# Patient Record
Sex: MALE | Race: OTHER | HISPANIC OR LATINO | URBAN - METROPOLITAN AREA
[De-identification: names, ages, dates, MRNs, and addresses within clinical notes are randomized per-mention and may not be internally consistent; named-entity substitution may affect disease eponyms.]

---

## 2024-04-09 ENCOUNTER — INPATIENT (INPATIENT)
Facility: HOSPITAL | Age: 69
LOS: 3 days | Discharge: HOME | DRG: 661 | End: 2024-04-13
Attending: INTERNAL MEDICINE | Admitting: INTERNAL MEDICINE
Payer: MEDICARE

## 2024-04-09 VITALS
HEART RATE: 95 BPM | OXYGEN SATURATION: 99 % | DIASTOLIC BLOOD PRESSURE: 77 MMHG | TEMPERATURE: 98 F | HEIGHT: 63.78 IN | WEIGHT: 156.09 LBS | SYSTOLIC BLOOD PRESSURE: 133 MMHG | RESPIRATION RATE: 14 BRPM

## 2024-04-09 LAB
ALBUMIN SERPL ELPH-MCNC: 3.4 G/DL — LOW (ref 3.5–5)
ALP SERPL-CCNC: 114 U/L — SIGNIFICANT CHANGE UP (ref 40–120)
ALT FLD-CCNC: 45 U/L DA — SIGNIFICANT CHANGE UP (ref 10–60)
ANION GAP SERPL CALC-SCNC: 4 MMOL/L — LOW (ref 5–17)
APTT BLD: 32.3 SEC — SIGNIFICANT CHANGE UP (ref 24.5–35.6)
AST SERPL-CCNC: 26 U/L — SIGNIFICANT CHANGE UP (ref 10–40)
BASOPHILS # BLD AUTO: 0.04 K/UL — SIGNIFICANT CHANGE UP (ref 0–0.2)
BASOPHILS NFR BLD AUTO: 0.4 % — SIGNIFICANT CHANGE UP (ref 0–2)
BILIRUB SERPL-MCNC: 1.3 MG/DL — HIGH (ref 0.2–1.2)
BUN SERPL-MCNC: 23 MG/DL — HIGH (ref 7–18)
CALCIUM SERPL-MCNC: 9.2 MG/DL — SIGNIFICANT CHANGE UP (ref 8.4–10.5)
CHLORIDE SERPL-SCNC: 108 MMOL/L — SIGNIFICANT CHANGE UP (ref 96–108)
CO2 SERPL-SCNC: 24 MMOL/L — SIGNIFICANT CHANGE UP (ref 22–31)
CREAT SERPL-MCNC: 2.22 MG/DL — HIGH (ref 0.5–1.3)
EGFR: 31 ML/MIN/1.73M2 — LOW
EOSINOPHIL # BLD AUTO: 0.16 K/UL — SIGNIFICANT CHANGE UP (ref 0–0.5)
EOSINOPHIL NFR BLD AUTO: 1.5 % — SIGNIFICANT CHANGE UP (ref 0–6)
GLUCOSE SERPL-MCNC: 160 MG/DL — HIGH (ref 70–99)
HCT VFR BLD CALC: 44.2 % — SIGNIFICANT CHANGE UP (ref 39–50)
HGB BLD-MCNC: 14.6 G/DL — SIGNIFICANT CHANGE UP (ref 13–17)
IMM GRANULOCYTES NFR BLD AUTO: 0.5 % — SIGNIFICANT CHANGE UP (ref 0–0.9)
INR BLD: 1.05 RATIO — SIGNIFICANT CHANGE UP (ref 0.85–1.18)
LYMPHOCYTES # BLD AUTO: 2.56 K/UL — SIGNIFICANT CHANGE UP (ref 1–3.3)
LYMPHOCYTES # BLD AUTO: 23.5 % — SIGNIFICANT CHANGE UP (ref 13–44)
MCHC RBC-ENTMCNC: 28.9 PG — SIGNIFICANT CHANGE UP (ref 27–34)
MCHC RBC-ENTMCNC: 33 GM/DL — SIGNIFICANT CHANGE UP (ref 32–36)
MCV RBC AUTO: 87.4 FL — SIGNIFICANT CHANGE UP (ref 80–100)
MONOCYTES # BLD AUTO: 1.11 K/UL — HIGH (ref 0–0.9)
MONOCYTES NFR BLD AUTO: 10.2 % — SIGNIFICANT CHANGE UP (ref 2–14)
NEUTROPHILS # BLD AUTO: 6.98 K/UL — SIGNIFICANT CHANGE UP (ref 1.8–7.4)
NEUTROPHILS NFR BLD AUTO: 63.9 % — SIGNIFICANT CHANGE UP (ref 43–77)
NRBC # BLD: 0 /100 WBCS — SIGNIFICANT CHANGE UP (ref 0–0)
PLATELET # BLD AUTO: 217 K/UL — SIGNIFICANT CHANGE UP (ref 150–400)
POTASSIUM SERPL-MCNC: 4.3 MMOL/L — SIGNIFICANT CHANGE UP (ref 3.5–5.3)
POTASSIUM SERPL-SCNC: 4.3 MMOL/L — SIGNIFICANT CHANGE UP (ref 3.5–5.3)
PROT SERPL-MCNC: 7.6 G/DL — SIGNIFICANT CHANGE UP (ref 6–8.3)
PROTHROM AB SERPL-ACNC: 12 SEC — SIGNIFICANT CHANGE UP (ref 9.5–13)
RBC # BLD: 5.06 M/UL — SIGNIFICANT CHANGE UP (ref 4.2–5.8)
RBC # FLD: 14.1 % — SIGNIFICANT CHANGE UP (ref 10.3–14.5)
SODIUM SERPL-SCNC: 136 MMOL/L — SIGNIFICANT CHANGE UP (ref 135–145)
WBC # BLD: 10.91 K/UL — HIGH (ref 3.8–10.5)
WBC # FLD AUTO: 10.91 K/UL — HIGH (ref 3.8–10.5)

## 2024-04-09 PROCEDURE — 99285 EMERGENCY DEPT VISIT HI MDM: CPT

## 2024-04-09 RX ORDER — KETOROLAC TROMETHAMINE 30 MG/ML
15 SYRINGE (ML) INJECTION ONCE
Refills: 0 | Status: DISCONTINUED | OUTPATIENT
Start: 2024-04-09 | End: 2024-04-09

## 2024-04-09 RX ORDER — ONDANSETRON 8 MG/1
4 TABLET, FILM COATED ORAL ONCE
Refills: 0 | Status: COMPLETED | OUTPATIENT
Start: 2024-04-09 | End: 2024-04-09

## 2024-04-09 RX ORDER — SODIUM CHLORIDE 9 MG/ML
1000 INJECTION INTRAMUSCULAR; INTRAVENOUS; SUBCUTANEOUS ONCE
Refills: 0 | Status: COMPLETED | OUTPATIENT
Start: 2024-04-09 | End: 2024-04-09

## 2024-04-09 RX ADMIN — SODIUM CHLORIDE 1000 MILLILITER(S): 9 INJECTION INTRAMUSCULAR; INTRAVENOUS; SUBCUTANEOUS at 22:56

## 2024-04-09 RX ADMIN — Medication 15 MILLIGRAM(S): at 22:55

## 2024-04-09 RX ADMIN — ONDANSETRON 4 MILLIGRAM(S): 8 TABLET, FILM COATED ORAL at 22:55

## 2024-04-09 NOTE — ED PROVIDER NOTE - PROGRESS NOTE DETAILS
Patient states no past issues of CKD patient creatinine elevated 2.2 otherwise clinically well will obtain CT UA likely admit given SHAHBAZ Man SANDOVAL: pt signed out to me. pt endorsed to urology given stone, will admit to medicine

## 2024-04-09 NOTE — ED PROVIDER NOTE - CLINICAL SUMMARY MEDICAL DECISION MAKING FREE TEXT BOX
Patient presenting with left CVA pain clinically appears to be kidney stone will obtain labs CT assess for stone assess kidney function

## 2024-04-09 NOTE — ED ADULT NURSE NOTE - NSFALLUNIVINTERV_ED_ALL_ED
Bed/Stretcher in lowest position, wheels locked, appropriate side rails in place/Call bell, personal items and telephone in reach/Instruct patient to call for assistance before getting out of bed/chair/stretcher/Non-slip footwear applied when patient is off stretcher/[place] to call system/Physically safe environment - no spills, clutter or unnecessary equipment/Purposeful proactive rounding/Room/bathroom lighting operational, light cord in reach

## 2024-04-10 DIAGNOSIS — N17.9 ACUTE KIDNEY FAILURE, UNSPECIFIED: ICD-10-CM

## 2024-04-10 DIAGNOSIS — N20.0 CALCULUS OF KIDNEY: ICD-10-CM

## 2024-04-10 DIAGNOSIS — E11.9 TYPE 2 DIABETES MELLITUS WITHOUT COMPLICATIONS: ICD-10-CM

## 2024-04-10 DIAGNOSIS — R10.9 UNSPECIFIED ABDOMINAL PAIN: ICD-10-CM

## 2024-04-10 DIAGNOSIS — Z29.9 ENCOUNTER FOR PROPHYLACTIC MEASURES, UNSPECIFIED: ICD-10-CM

## 2024-04-10 DIAGNOSIS — E78.5 HYPERLIPIDEMIA, UNSPECIFIED: ICD-10-CM

## 2024-04-10 LAB
ALBUMIN SERPL ELPH-MCNC: 3.1 G/DL — LOW (ref 3.5–5)
ALP SERPL-CCNC: 101 U/L — SIGNIFICANT CHANGE UP (ref 40–120)
ALT FLD-CCNC: 34 U/L DA — SIGNIFICANT CHANGE UP (ref 10–60)
ANION GAP SERPL CALC-SCNC: 5 MMOL/L — SIGNIFICANT CHANGE UP (ref 5–17)
ANION GAP SERPL CALC-SCNC: 7 MMOL/L — SIGNIFICANT CHANGE UP (ref 5–17)
APPEARANCE UR: CLEAR — SIGNIFICANT CHANGE UP
AST SERPL-CCNC: 20 U/L — SIGNIFICANT CHANGE UP (ref 10–40)
BASOPHILS # BLD AUTO: 0.04 K/UL — SIGNIFICANT CHANGE UP (ref 0–0.2)
BASOPHILS NFR BLD AUTO: 0.3 % — SIGNIFICANT CHANGE UP (ref 0–2)
BILIRUB SERPL-MCNC: 1.6 MG/DL — HIGH (ref 0.2–1.2)
BILIRUB UR-MCNC: NEGATIVE — SIGNIFICANT CHANGE UP
BLD GP AB SCN SERPL QL: SIGNIFICANT CHANGE UP
BUN SERPL-MCNC: 22 MG/DL — HIGH (ref 7–18)
BUN SERPL-MCNC: 22 MG/DL — HIGH (ref 7–18)
CALCIUM SERPL-MCNC: 8.6 MG/DL — SIGNIFICANT CHANGE UP (ref 8.4–10.5)
CALCIUM SERPL-MCNC: 9 MG/DL — SIGNIFICANT CHANGE UP (ref 8.4–10.5)
CHLORIDE SERPL-SCNC: 107 MMOL/L — SIGNIFICANT CHANGE UP (ref 96–108)
CHLORIDE SERPL-SCNC: 108 MMOL/L — SIGNIFICANT CHANGE UP (ref 96–108)
CO2 SERPL-SCNC: 23 MMOL/L — SIGNIFICANT CHANGE UP (ref 22–31)
CO2 SERPL-SCNC: 23 MMOL/L — SIGNIFICANT CHANGE UP (ref 22–31)
COLOR SPEC: YELLOW — SIGNIFICANT CHANGE UP
CREAT ?TM UR-MCNC: 112 MG/DL — SIGNIFICANT CHANGE UP
CREAT SERPL-MCNC: 1.84 MG/DL — HIGH (ref 0.5–1.3)
CREAT SERPL-MCNC: 2.15 MG/DL — HIGH (ref 0.5–1.3)
DIFF PNL FLD: NEGATIVE — SIGNIFICANT CHANGE UP
EGFR: 33 ML/MIN/1.73M2 — LOW
EGFR: 39 ML/MIN/1.73M2 — LOW
EOSINOPHIL # BLD AUTO: 0.28 K/UL — SIGNIFICANT CHANGE UP (ref 0–0.5)
EOSINOPHIL NFR BLD AUTO: 2.4 % — SIGNIFICANT CHANGE UP (ref 0–6)
GLUCOSE BLDC GLUCOMTR-MCNC: 105 MG/DL — HIGH (ref 70–99)
GLUCOSE BLDC GLUCOMTR-MCNC: 115 MG/DL — HIGH (ref 70–99)
GLUCOSE BLDC GLUCOMTR-MCNC: 122 MG/DL — HIGH (ref 70–99)
GLUCOSE BLDC GLUCOMTR-MCNC: 128 MG/DL — HIGH (ref 70–99)
GLUCOSE SERPL-MCNC: 146 MG/DL — HIGH (ref 70–99)
GLUCOSE SERPL-MCNC: 148 MG/DL — HIGH (ref 70–99)
GLUCOSE UR QL: 250 MG/DL
HCT VFR BLD CALC: 46.3 % — SIGNIFICANT CHANGE UP (ref 39–50)
HGB BLD-MCNC: 14.8 G/DL — SIGNIFICANT CHANGE UP (ref 13–17)
IMM GRANULOCYTES NFR BLD AUTO: 0.5 % — SIGNIFICANT CHANGE UP (ref 0–0.9)
KETONES UR-MCNC: NEGATIVE MG/DL — SIGNIFICANT CHANGE UP
LEUKOCYTE ESTERASE UR-ACNC: NEGATIVE — SIGNIFICANT CHANGE UP
LYMPHOCYTES # BLD AUTO: 3.83 K/UL — HIGH (ref 1–3.3)
LYMPHOCYTES # BLD AUTO: 32.4 % — SIGNIFICANT CHANGE UP (ref 13–44)
MAGNESIUM SERPL-MCNC: 2.1 MG/DL — SIGNIFICANT CHANGE UP (ref 1.6–2.6)
MCHC RBC-ENTMCNC: 28.2 PG — SIGNIFICANT CHANGE UP (ref 27–34)
MCHC RBC-ENTMCNC: 32 GM/DL — SIGNIFICANT CHANGE UP (ref 32–36)
MCV RBC AUTO: 88.4 FL — SIGNIFICANT CHANGE UP (ref 80–100)
MONOCYTES # BLD AUTO: 1.15 K/UL — HIGH (ref 0–0.9)
MONOCYTES NFR BLD AUTO: 9.7 % — SIGNIFICANT CHANGE UP (ref 2–14)
NEUTROPHILS # BLD AUTO: 6.45 K/UL — SIGNIFICANT CHANGE UP (ref 1.8–7.4)
NEUTROPHILS NFR BLD AUTO: 54.7 % — SIGNIFICANT CHANGE UP (ref 43–77)
NITRITE UR-MCNC: NEGATIVE — SIGNIFICANT CHANGE UP
NRBC # BLD: 0 /100 WBCS — SIGNIFICANT CHANGE UP (ref 0–0)
PH UR: 6 — SIGNIFICANT CHANGE UP (ref 5–8)
PHOSPHATE SERPL-MCNC: 2.6 MG/DL — SIGNIFICANT CHANGE UP (ref 2.5–4.5)
PLATELET # BLD AUTO: 218 K/UL — SIGNIFICANT CHANGE UP (ref 150–400)
POTASSIUM SERPL-MCNC: 4 MMOL/L — SIGNIFICANT CHANGE UP (ref 3.5–5.3)
POTASSIUM SERPL-MCNC: 4.1 MMOL/L — SIGNIFICANT CHANGE UP (ref 3.5–5.3)
POTASSIUM SERPL-SCNC: 4 MMOL/L — SIGNIFICANT CHANGE UP (ref 3.5–5.3)
POTASSIUM SERPL-SCNC: 4.1 MMOL/L — SIGNIFICANT CHANGE UP (ref 3.5–5.3)
PROT SERPL-MCNC: 7.3 G/DL — SIGNIFICANT CHANGE UP (ref 6–8.3)
PROT UR-MCNC: ABNORMAL MG/DL
RBC # BLD: 5.24 M/UL — SIGNIFICANT CHANGE UP (ref 4.2–5.8)
RBC # FLD: 14.3 % — SIGNIFICANT CHANGE UP (ref 10.3–14.5)
RBC CASTS # UR COMP ASSIST: 0 /HPF — SIGNIFICANT CHANGE UP (ref 0–4)
SODIUM SERPL-SCNC: 136 MMOL/L — SIGNIFICANT CHANGE UP (ref 135–145)
SODIUM SERPL-SCNC: 137 MMOL/L — SIGNIFICANT CHANGE UP (ref 135–145)
SODIUM UR-SCNC: 114 MMOL/L — SIGNIFICANT CHANGE UP
SP GR SPEC: 1.02 — SIGNIFICANT CHANGE UP (ref 1–1.03)
UROBILINOGEN FLD QL: 1 MG/DL — SIGNIFICANT CHANGE UP (ref 0.2–1)
UUN UR-MCNC: 625 MG/DL — SIGNIFICANT CHANGE UP
WBC # BLD: 11.81 K/UL — HIGH (ref 3.8–10.5)
WBC # FLD AUTO: 11.81 K/UL — HIGH (ref 3.8–10.5)
WBC UR QL: 0 /HPF — SIGNIFICANT CHANGE UP (ref 0–5)

## 2024-04-10 PROCEDURE — 99223 1ST HOSP IP/OBS HIGH 75: CPT

## 2024-04-10 PROCEDURE — 99222 1ST HOSP IP/OBS MODERATE 55: CPT

## 2024-04-10 PROCEDURE — 74176 CT ABD & PELVIS W/O CONTRAST: CPT | Mod: 26,MC

## 2024-04-10 RX ORDER — ONDANSETRON 8 MG/1
4 TABLET, FILM COATED ORAL EVERY 6 HOURS
Refills: 0 | Status: DISCONTINUED | OUTPATIENT
Start: 2024-04-10 | End: 2024-04-13

## 2024-04-10 RX ORDER — ACETAMINOPHEN 500 MG
650 TABLET ORAL EVERY 6 HOURS
Refills: 0 | Status: DISCONTINUED | OUTPATIENT
Start: 2024-04-10 | End: 2024-04-13

## 2024-04-10 RX ORDER — HYDROMORPHONE HYDROCHLORIDE 2 MG/ML
0.5 INJECTION INTRAMUSCULAR; INTRAVENOUS; SUBCUTANEOUS EVERY 6 HOURS
Refills: 0 | Status: DISCONTINUED | OUTPATIENT
Start: 2024-04-10 | End: 2024-04-13

## 2024-04-10 RX ORDER — PRAMIPEXOLE DIHYDROCHLORIDE 0.12 MG/1
1 TABLET ORAL
Refills: 0 | DISCHARGE

## 2024-04-10 RX ORDER — HYDROMORPHONE HYDROCHLORIDE 2 MG/ML
0.5 INJECTION INTRAMUSCULAR; INTRAVENOUS; SUBCUTANEOUS EVERY 4 HOURS
Refills: 0 | Status: DISCONTINUED | OUTPATIENT
Start: 2024-04-10 | End: 2024-04-10

## 2024-04-10 RX ORDER — INSULIN LISPRO 100/ML
VIAL (ML) SUBCUTANEOUS EVERY 6 HOURS
Refills: 0 | Status: DISCONTINUED | OUTPATIENT
Start: 2024-04-10 | End: 2024-04-10

## 2024-04-10 RX ORDER — OXYCODONE HYDROCHLORIDE 5 MG/1
10 TABLET ORAL EVERY 6 HOURS
Refills: 0 | Status: DISCONTINUED | OUTPATIENT
Start: 2024-04-10 | End: 2024-04-13

## 2024-04-10 RX ORDER — POLYETHYLENE GLYCOL 3350 17 G/17G
17 POWDER, FOR SOLUTION ORAL DAILY
Refills: 0 | Status: DISCONTINUED | OUTPATIENT
Start: 2024-04-10 | End: 2024-04-13

## 2024-04-10 RX ORDER — HEPARIN SODIUM 5000 [USP'U]/ML
5000 INJECTION INTRAVENOUS; SUBCUTANEOUS EVERY 8 HOURS
Refills: 0 | Status: DISCONTINUED | OUTPATIENT
Start: 2024-04-10 | End: 2024-04-11

## 2024-04-10 RX ORDER — SODIUM CHLORIDE 9 MG/ML
1000 INJECTION, SOLUTION INTRAVENOUS
Refills: 0 | Status: DISCONTINUED | OUTPATIENT
Start: 2024-04-10 | End: 2024-04-11

## 2024-04-10 RX ORDER — TAMSULOSIN HYDROCHLORIDE 0.4 MG/1
0.4 CAPSULE ORAL AT BEDTIME
Refills: 0 | Status: DISCONTINUED | OUTPATIENT
Start: 2024-04-10 | End: 2024-04-13

## 2024-04-10 RX ORDER — METFORMIN HYDROCHLORIDE 850 MG/1
1 TABLET ORAL
Refills: 0 | DISCHARGE

## 2024-04-10 RX ORDER — SENNA PLUS 8.6 MG/1
2 TABLET ORAL AT BEDTIME
Refills: 0 | Status: DISCONTINUED | OUTPATIENT
Start: 2024-04-10 | End: 2024-04-13

## 2024-04-10 RX ORDER — SODIUM CHLORIDE 9 MG/ML
1000 INJECTION, SOLUTION INTRAVENOUS
Refills: 0 | Status: DISCONTINUED | OUTPATIENT
Start: 2024-04-10 | End: 2024-04-10

## 2024-04-10 RX ORDER — HEPARIN SODIUM 5000 [USP'U]/ML
5000 INJECTION INTRAVENOUS; SUBCUTANEOUS EVERY 12 HOURS
Refills: 0 | Status: DISCONTINUED | OUTPATIENT
Start: 2024-04-10 | End: 2024-04-10

## 2024-04-10 RX ORDER — TAMSULOSIN HYDROCHLORIDE 0.4 MG/1
0.4 CAPSULE ORAL ONCE
Refills: 0 | Status: COMPLETED | OUTPATIENT
Start: 2024-04-10 | End: 2024-04-10

## 2024-04-10 RX ORDER — INSULIN LISPRO 100/ML
VIAL (ML) SUBCUTANEOUS
Refills: 0 | Status: DISCONTINUED | OUTPATIENT
Start: 2024-04-10 | End: 2024-04-13

## 2024-04-10 RX ADMIN — HYDROMORPHONE HYDROCHLORIDE 0.5 MILLIGRAM(S): 2 INJECTION INTRAMUSCULAR; INTRAVENOUS; SUBCUTANEOUS at 12:20

## 2024-04-10 RX ADMIN — Medication 15 MILLIGRAM(S): at 04:55

## 2024-04-10 RX ADMIN — Medication 650 MILLIGRAM(S): at 07:07

## 2024-04-10 RX ADMIN — SENNA PLUS 2 TABLET(S): 8.6 TABLET ORAL at 23:52

## 2024-04-10 RX ADMIN — HYDROMORPHONE HYDROCHLORIDE 0.5 MILLIGRAM(S): 2 INJECTION INTRAMUSCULAR; INTRAVENOUS; SUBCUTANEOUS at 20:10

## 2024-04-10 RX ADMIN — HYDROMORPHONE HYDROCHLORIDE 0.5 MILLIGRAM(S): 2 INJECTION INTRAMUSCULAR; INTRAVENOUS; SUBCUTANEOUS at 19:17

## 2024-04-10 RX ADMIN — HYDROMORPHONE HYDROCHLORIDE 0.5 MILLIGRAM(S): 2 INJECTION INTRAMUSCULAR; INTRAVENOUS; SUBCUTANEOUS at 12:07

## 2024-04-10 RX ADMIN — TAMSULOSIN HYDROCHLORIDE 0.4 MILLIGRAM(S): 0.4 CAPSULE ORAL at 21:17

## 2024-04-10 RX ADMIN — HEPARIN SODIUM 5000 UNIT(S): 5000 INJECTION INTRAVENOUS; SUBCUTANEOUS at 05:33

## 2024-04-10 RX ADMIN — TAMSULOSIN HYDROCHLORIDE 0.4 MILLIGRAM(S): 0.4 CAPSULE ORAL at 04:08

## 2024-04-10 RX ADMIN — SODIUM CHLORIDE 90 MILLILITER(S): 9 INJECTION, SOLUTION INTRAVENOUS at 08:13

## 2024-04-10 RX ADMIN — SODIUM CHLORIDE 1000 MILLILITER(S): 9 INJECTION INTRAMUSCULAR; INTRAVENOUS; SUBCUTANEOUS at 05:15

## 2024-04-10 RX ADMIN — HEPARIN SODIUM 5000 UNIT(S): 5000 INJECTION INTRAVENOUS; SUBCUTANEOUS at 17:47

## 2024-04-10 RX ADMIN — Medication 650 MILLIGRAM(S): at 05:33

## 2024-04-10 RX ADMIN — HEPARIN SODIUM 5000 UNIT(S): 5000 INJECTION INTRAVENOUS; SUBCUTANEOUS at 21:18

## 2024-04-10 NOTE — H&P ADULT - ATTENDING COMMENTS
Vital Signs Last 24 Hrs  T(C): 36.7 (10 Apr 2024 07:22), Max: 36.8 (09 Apr 2024 23:54)  T(F): 98 (10 Apr 2024 07:22), Max: 98.3 (09 Apr 2024 23:54)  HR: 69 (10 Apr 2024 07:22) (69 - 95)  BP: 112/75 (10 Apr 2024 07:22) (112/75 - 133/77)  RR: 16 (10 Apr 2024 07:22) (14 - 17)  SpO2: 96% (10 Apr 2024 07:22) (96% - 99%)  Parameters below as of 10 Apr 2024 07:22  Patient On (Oxygen Delivery Method): room air    Labs reviewed  Elevated renal indices  baseline unknown    UA - unremarkable    CT abdomen/pelvis  5 mm sized left distal ureter stone with mild hydroureteronephrosis.  Additional non-obstructing renal stones bilaterally.  Prostatic enlargement.    Impression   68 year old man with hx of DM, HLD nephrolithiasis admitted with left flank pain for the past week and noted with multiple ureteral stones and hydroureteronephrosis likely telltale of recent obstructing renal stones or possible related to BPH     Plan   Admit   Urine sample - check FeNa  IVF hydration to address? SHAHBAZ and help expel stones  Urology consult     Other plans as above

## 2024-04-10 NOTE — PHARMACOTHERAPY INTERVENTION NOTE - COMMENTS
Medication reconciliation done with patient and daughter; reports currently taking three medications for blood sugar, cholesterol, and restless legs.    [facility] ([phone]) was contacted; they have prescriptions for metformin (February 2024 for 90 days), pramipexole (February 2024 for 30 days), and atorvastatin (December 2023 for 90 days).

## 2024-04-10 NOTE — H&P ADULT - ASSESSMENT
This is a 68-year-old male with pmhx DM, HLD, kidney stones  presenting with left flank pain for past 5 days. Patient states his pain radiated from left back to left abdominal wall. He had kidney stones in the past, and this pain is similar to prior episodes. Admitted for SHAHBAZ and kidney stone     In ED  vitals: BP: 133/77, On RA, Afebrile  CT A/P: 5 mm sized left distal ureter stone with mild hydroureteronephrosis.  Additional nonobstructing renal stones bilaterally.Prostatic enlargement.       MED REC IN AM

## 2024-04-10 NOTE — H&P ADULT - PROBLEM SELECTOR PLAN 2
Pt w/ SCr 2.22 on admission  Baseline SCr -unknown   F/U Urine Lytes, calculate FeNa  IVF for now, follow BMP daily

## 2024-04-10 NOTE — CONSULT NOTE ADULT - SUBJECTIVE AND OBJECTIVE BOX
GENERAL SURGERY CONSULT NOTE    chief complaint of flank pain    HPI:   67 y/o male with PMHx of HLD, DM , Kidney Stones x 2 presents to the ED with flank pain x 5 days . Pain is mostly in the left flank described as sharp , persistent , 8/10 radiating to the left low abdomen similar to his former kidney stones in the past. Admits nausea but no vomiting no diarrhea dysuria hematuria fever chills or cough denies chest pain shortness of breath. No other complaints at this time.     PAST MEDICAL & SURGICAL HISTORY:      MEDICATIONS  (STANDING):  dextrose 5% + sodium chloride 0.45%. 1000 milliLiter(s) (85 mL/Hr) IV Continuous <Continuous>  heparin   Injectable 5000 Unit(s) SubCutaneous every 12 hours    MEDICATIONS  (PRN):  acetaminophen     Tablet .. 650 milliGRAM(s) Oral every 6 hours PRN Temp greater or equal to 38C (100.4F), Mild Pain (1 - 3)  HYDROmorphone  Injectable 0.5 milliGRAM(s) IV Push every 4 hours PRN Moderate Pain (4 - 6)  ondansetron Injectable 4 milliGRAM(s) IV Push every 6 hours PRN Nausea and/or Vomiting      Allergies    No Known Allergies    Intolerances        Social History:      FAMILY HISTORY:      Vital Signs Last 24 Hrs  T(C): 36.8 (2024 23:54), Max: 36.8 (2024 23:54)  T(F): 98.3 (2024 23:54), Max: 98.3 (2024 23:54)  HR: 83 (2024 23:54) (83 - 95)  BP: 124/75 (2024 23:54) (124/75 - 133/77)  BP(mean): --  RR: 17 (2024 23:54) (14 - 17)  SpO2: 98% (2024 23:54) (98% - 99%)    Parameters below as of 2024 23:54  Patient On (Oxygen Delivery Method): room air        Physical Exam:  Vital Signs Last 24 Hrs  T(C): 36.8 (2024 23:54), Max: 36.8 (2024 23:54)  T(F): 98.3 (2024 23:54), Max: 98.3 (2024 23:54)  HR: 83 (2024 23:54) (83 - 95)  BP: 124/75 (2024 23:54) (124/75 - 133/77)  BP(mean): --  RR: 17 (2024 23:54) (14 - 17)  SpO2: 98% (2024 23:54) (98% - 99%)    Parameters below as of 2024 23:54  Patient On (Oxygen Delivery Method): room air        General:  A&Ox3,Appears stated age, No acute distress,  Head: NC/AT  EENT: PERRLA. EOMI. Conjunctiva and sclera clear. Pharynx clear.  Neck: Supple. No JVD  Lungs: CTA B/l. Nonlabored Respirations  CV: +S1S2, RRR  Abdomen: Soft, Nondistended,  Nontender, no guarding, no rebound  Back: Lt flank CVA tenderness, No midline back tenderness  Extremities: Warm and well perfused. 2+ peripheral pulses b/l. Calf soft, nontender b/l. No pedal edema.            LABS:                        14.6   10.91 )-----------( 217      ( 2024 22:10 )             44.2     04-09    136  |  108  |  23<H>  ----------------------------<  160<H>  4.3   |  24  |  2.22<H>    Ca    9.2      2024 22:10    TPro  7.6  /  Alb  3.4<L>  /  TBili  1.3<H>  /  DBili  x   /  AST  26  /  ALT  45  /  AlkPhos  114  04-09    LIVER FUNCTIONS - ( 2024 22:10 )  Alb: 3.4 g/dL / Pro: 7.6 g/dL / ALK PHOS: 114 U/L / ALT: 45 U/L DA / AST: 26 U/L / GGT: x           PT/INR - ( 2024 22:10 )   PT: 12.0 sec;   INR: 1.05 ratio         PTT - ( 2024 22:10 )  PTT:32.3 sec  Urinalysis Basic - ( 10 Apr 2024 01:02 )    Color: Yellow / Appearance: Clear / S.016 / pH: x  Gluc: x / Ketone: Negative mg/dL  / Bili: Negative / Urobili: 1.0 mg/dL   Blood: x / Protein: Trace mg/dL / Nitrite: Negative   Leuk Esterase: Negative / RBC: 0 /HPF / WBC 0 /HPF   Sq Epi: x / Non Sq Epi: x / Bacteria: x        RADIOLOGY & ADDITIONAL STUDIES:  < from: CT Abdomen and Pelvis No Cont (04.10.24 @ 01:13) >  FINDINGS:  LOWER CHEST: Nodular and fibrous densities in the medial portion of both   lower lobes.    LIVER: 2.4 x 1.6 cm sized left hepatic cyst. A punctate peripheral   calcification, likely capsular calcification (4-61).  BILE DUCTS: Normal caliber.  GALLBLADDER: Within normal limits.  SPLEEN: Within normal limits.  PANCREAS: Within normal limits.  ADRENALS: Within normal limits.  KIDNEYS/URETERS: 5 mm sized stone in the left distal ureter with mild   hydroureteronephrosis (6-60). Nonobstructing stones in the left kidney.  Nonobstructing stone in the lower pole of the right kidney. 3.8 cm sized   right renal stone.  BLADDER: Within normal limits.  REPRODUCTIVE ORGANS: Prostatic enlargement elevating the bladder base.    BOWEL: No bowel obstruction. Appendix is normal.  PERITONEUM: No ascites.  VESSELS: Within normal limits.  RETROPERITONEUM/LYMPH NODES: No lymphadenopathy.  ABDOMINAL WALL: Within normal limits.  BONES: Degenerative change.    IMPRESSION:  5 mm sized left distal ureter stone with mild hydroureteronephrosis.  Additional nonobstructing renal stones bilaterally.  Prostatic enlargement.      < end of copied text >

## 2024-04-10 NOTE — H&P ADULT - PROBLEM SELECTOR PLAN 1
p/w left loin to groin pain   Hx kidney stones  CT A/P: 5 mm sized left distal ureter stone with mild hydroureteronephrosis.  Additional nonobstructive renal stones bilaterally. Prostatic enlargement.  Urology consulted   IVF, NPO

## 2024-04-10 NOTE — H&P ADULT - HISTORY OF PRESENT ILLNESS
This is a 68-year-old male with pmhx DM, HLD, kidney stones  presenting with left flank pain for past 5 days. Patient states his pain radiated from left back to left abdominal wall. He had kidney stones in the past, and this pain is similar to prior episodes. This is a his third episodes, the first one passed spontaneously the second he underwent a procedure but does not recall. He endorses nausea but no vomiting no diarrhea dysuria hematuria fever chills or cough denies chest pain shortness of breath. He is not aware about prior history of kidney issues, or elevated creatinine.     In ED  vitals: BP: 133/77, On RA, Afebrile  CT A/P: 5 mm sized left distal ureter stone with mild hydroureteronephrosis.  Additional nonobstructing renal stones bilaterally.Prostatic enlargement.

## 2024-04-10 NOTE — PATIENT PROFILE ADULT - FALL HARM RISK - UNIVERSAL INTERVENTIONS
Bed in lowest position, wheels locked, appropriate side rails in place/Call bell, personal items and telephone in reach/Instruct patient to call for assistance before getting out of bed or chair/Non-slip footwear when patient is out of bed/[place] to call system/Physically safe environment - no spills, clutter or unnecessary equipment/Purposeful Proactive Rounding/Room/bathroom lighting operational, light cord in reach

## 2024-04-10 NOTE — H&P ADULT - NSHPPHYSICALEXAM_GEN_ALL_CORE
GENERAL: NAD  HEAD:  Atraumatic, Normocephalic  EYES:  conjunctiva and sclera clear  NECK: Supple, No JVD, Normal thyroid  CHEST/LUNG: Clear to auscultation No rales, rhonchi, wheezing, or rubs  HEART: Regular rate and rhythm; No murmurs, rubs, or gallops  ABDOMEN: left abdominal wall TTP (+), No CVA tenderness. Nondistended; Bowel sounds present  NERVOUS SYSTEM:  Alert & Oriented X3,    EXTREMITIES:  2+ Peripheral Pulses, No clubbing, cyanosis, or edema  SKIN: warm dry

## 2024-04-10 NOTE — CONSULT NOTE ADULT - NS ATTEND AMEND GEN_ALL_CORE FT
69 y/o male with PMHx of HLD, DM , Kidney Stones x 2 presents to the ED with flank pain x 5 days . Pain is mostly in the left flank described as sharp , persistent , 8/10 radiating to the left low abdomen similar to his former kidney stones in the past. Afebrile, labs wnl except CKD , CT shows 5 mm sized left distal ureter stone with mild hydroureteronephrosis.    His Cr on presentation was 2.0 and increased to 2.1 however patient received dose of toradol. Unsure of his baseline Cr.     Review of the patient's CT scan demonstrates mild hydroureter and the stone is likely to pass with tamsulosin. Pt also with enlarged prostate and distended bladder.     Plan  Trend Cr  I have reviewed the patient's labs and imaging demonstrating 5 mm distal stone and enlarged prostate with distended bladder  Please obtain and record PVR  FU urine culture  Continue tamsulosin 0.4 mg   Will continue to follow and if Cr not improving or pain not controlled will take to OR for stent placement  Patient can have diet at this time but will make npo past midnight if OR is needed Thursday or Friday

## 2024-04-10 NOTE — CONSULT NOTE ADULT - ASSESSMENT
67 y/o male with PMHx of HLD, DM , Kidney Stones x 2 presents to the ED with flank pain x 5 days . Pain is mostly in the left flank described as sharp , persistent , 8/10 radiating to the left low abdomen similar to his former kidney stones in the past. Afebrile, labs wnl except CKD , CT shows 5 mm sized left distal ureter stone with mild hydroureteronephrosis.    Keep NPO, IVF   M.E.T. - Flomax strain urine for stones   pain/nausea control prn   Other care/mgmt per primary team  Will update on surgical plans

## 2024-04-11 ENCOUNTER — TRANSCRIPTION ENCOUNTER (OUTPATIENT)
Age: 69
End: 2024-04-11

## 2024-04-11 DIAGNOSIS — Z01.818 ENCOUNTER FOR OTHER PREPROCEDURAL EXAMINATION: ICD-10-CM

## 2024-04-11 LAB
A1C WITH ESTIMATED AVERAGE GLUCOSE RESULT: 6.6 % — HIGH (ref 4–5.6)
ANION GAP SERPL CALC-SCNC: 7 MMOL/L — SIGNIFICANT CHANGE UP (ref 5–17)
BUN SERPL-MCNC: 24 MG/DL — HIGH (ref 7–18)
CALCIUM SERPL-MCNC: 8.9 MG/DL — SIGNIFICANT CHANGE UP (ref 8.4–10.5)
CHLORIDE SERPL-SCNC: 104 MMOL/L — SIGNIFICANT CHANGE UP (ref 96–108)
CO2 SERPL-SCNC: 21 MMOL/L — LOW (ref 22–31)
CREAT SERPL-MCNC: 2.13 MG/DL — HIGH (ref 0.5–1.3)
CULTURE RESULTS: SIGNIFICANT CHANGE UP
EGFR: 33 ML/MIN/1.73M2 — LOW
ESTIMATED AVERAGE GLUCOSE: 143 MG/DL — HIGH (ref 68–114)
GLUCOSE BLDC GLUCOMTR-MCNC: 113 MG/DL — HIGH (ref 70–99)
GLUCOSE BLDC GLUCOMTR-MCNC: 118 MG/DL — HIGH (ref 70–99)
GLUCOSE BLDC GLUCOMTR-MCNC: 131 MG/DL — HIGH (ref 70–99)
GLUCOSE BLDC GLUCOMTR-MCNC: 137 MG/DL — HIGH (ref 70–99)
GLUCOSE SERPL-MCNC: 161 MG/DL — HIGH (ref 70–99)
HCT VFR BLD CALC: 41.7 % — SIGNIFICANT CHANGE UP (ref 39–50)
HGB BLD-MCNC: 13.5 G/DL — SIGNIFICANT CHANGE UP (ref 13–17)
MAGNESIUM SERPL-MCNC: 2.2 MG/DL — SIGNIFICANT CHANGE UP (ref 1.6–2.6)
MCHC RBC-ENTMCNC: 28.6 PG — SIGNIFICANT CHANGE UP (ref 27–34)
MCHC RBC-ENTMCNC: 32.4 GM/DL — SIGNIFICANT CHANGE UP (ref 32–36)
MCV RBC AUTO: 88.3 FL — SIGNIFICANT CHANGE UP (ref 80–100)
NRBC # BLD: 0 /100 WBCS — SIGNIFICANT CHANGE UP (ref 0–0)
PHOSPHATE SERPL-MCNC: 2.9 MG/DL — SIGNIFICANT CHANGE UP (ref 2.5–4.5)
PLATELET # BLD AUTO: 208 K/UL — SIGNIFICANT CHANGE UP (ref 150–400)
POTASSIUM SERPL-MCNC: 4.3 MMOL/L — SIGNIFICANT CHANGE UP (ref 3.5–5.3)
POTASSIUM SERPL-SCNC: 4.3 MMOL/L — SIGNIFICANT CHANGE UP (ref 3.5–5.3)
RBC # BLD: 4.72 M/UL — SIGNIFICANT CHANGE UP (ref 4.2–5.8)
RBC # FLD: 13.9 % — SIGNIFICANT CHANGE UP (ref 10.3–14.5)
SODIUM SERPL-SCNC: 132 MMOL/L — LOW (ref 135–145)
SPECIMEN SOURCE: SIGNIFICANT CHANGE UP
WBC # BLD: 9.78 K/UL — SIGNIFICANT CHANGE UP (ref 3.8–10.5)
WBC # FLD AUTO: 9.78 K/UL — SIGNIFICANT CHANGE UP (ref 3.8–10.5)

## 2024-04-11 PROCEDURE — 99233 SBSQ HOSP IP/OBS HIGH 50: CPT

## 2024-04-11 PROCEDURE — 99233 SBSQ HOSP IP/OBS HIGH 50: CPT | Mod: GC

## 2024-04-11 PROCEDURE — 93010 ELECTROCARDIOGRAM REPORT: CPT

## 2024-04-11 RX ORDER — ACETAMINOPHEN 500 MG
1000 TABLET ORAL ONCE
Refills: 0 | Status: COMPLETED | OUTPATIENT
Start: 2024-04-11 | End: 2024-04-11

## 2024-04-11 RX ORDER — HYDROMORPHONE HYDROCHLORIDE 2 MG/ML
0.2 INJECTION INTRAMUSCULAR; INTRAVENOUS; SUBCUTANEOUS ONCE
Refills: 0 | Status: DISCONTINUED | OUTPATIENT
Start: 2024-04-11 | End: 2024-04-11

## 2024-04-11 RX ORDER — SODIUM CHLORIDE 9 MG/ML
1000 INJECTION, SOLUTION INTRAVENOUS
Refills: 0 | Status: DISCONTINUED | OUTPATIENT
Start: 2024-04-11 | End: 2024-04-13

## 2024-04-11 RX ADMIN — TAMSULOSIN HYDROCHLORIDE 0.4 MILLIGRAM(S): 0.4 CAPSULE ORAL at 21:15

## 2024-04-11 RX ADMIN — HYDROMORPHONE HYDROCHLORIDE 0.5 MILLIGRAM(S): 2 INJECTION INTRAMUSCULAR; INTRAVENOUS; SUBCUTANEOUS at 15:28

## 2024-04-11 RX ADMIN — SENNA PLUS 2 TABLET(S): 8.6 TABLET ORAL at 21:15

## 2024-04-11 RX ADMIN — HYDROMORPHONE HYDROCHLORIDE 0.5 MILLIGRAM(S): 2 INJECTION INTRAMUSCULAR; INTRAVENOUS; SUBCUTANEOUS at 09:28

## 2024-04-11 RX ADMIN — ONDANSETRON 4 MILLIGRAM(S): 8 TABLET, FILM COATED ORAL at 21:19

## 2024-04-11 RX ADMIN — HYDROMORPHONE HYDROCHLORIDE 0.2 MILLIGRAM(S): 2 INJECTION INTRAMUSCULAR; INTRAVENOUS; SUBCUTANEOUS at 18:25

## 2024-04-11 RX ADMIN — HYDROMORPHONE HYDROCHLORIDE 0.5 MILLIGRAM(S): 2 INJECTION INTRAMUSCULAR; INTRAVENOUS; SUBCUTANEOUS at 22:34

## 2024-04-11 RX ADMIN — HYDROMORPHONE HYDROCHLORIDE 0.5 MILLIGRAM(S): 2 INJECTION INTRAMUSCULAR; INTRAVENOUS; SUBCUTANEOUS at 08:47

## 2024-04-11 RX ADMIN — HYDROMORPHONE HYDROCHLORIDE 0.5 MILLIGRAM(S): 2 INJECTION INTRAMUSCULAR; INTRAVENOUS; SUBCUTANEOUS at 21:41

## 2024-04-11 RX ADMIN — HYDROMORPHONE HYDROCHLORIDE 0.5 MILLIGRAM(S): 2 INJECTION INTRAMUSCULAR; INTRAVENOUS; SUBCUTANEOUS at 16:20

## 2024-04-11 RX ADMIN — SODIUM CHLORIDE 85 MILLILITER(S): 9 INJECTION, SOLUTION INTRAVENOUS at 11:32

## 2024-04-11 RX ADMIN — HYDROMORPHONE HYDROCHLORIDE 0.2 MILLIGRAM(S): 2 INJECTION INTRAMUSCULAR; INTRAVENOUS; SUBCUTANEOUS at 17:24

## 2024-04-11 NOTE — DISCHARGE NOTE PROVIDER - CARE PROVIDERS DIRECT ADDRESSES
[email or web address]

## 2024-04-11 NOTE — PROGRESS NOTE ADULT - PROBLEM SELECTOR PLAN 1
p/w left flank  to groin pain x 5days  Hx kidney stones  CT A/P: 5 mm sized left distal ureter stone with mild hydroureteronephrosis. Additional nonobstructive renal stones bilaterally. Prostatic enlargement.  Urology team following  > Continue tamsulosin 0.4 mg   > if Cr not improving or pain not controlled will take to OR for stent placement. Patient can have diet at this time but will make npo past midnight if OR is needed Thursday or Friday.  - IVF,  - NPO p/w left flank  to groin pain x 5days  Hx kidney stones  CT A/P: 5 mm sized left distal ureter stone with mild hydroureteronephrosis. Additional nonobstructive renal stones bilaterally. Prostatic enlargement.  UA & Ucx neg  Urology team following  > Continue tamsulosin 0.4 mg   > if Cr not improving or pain not controlled will take to OR for stent placement. Patient can have diet at this time but will make npo past midnight if OR is needed Thursday or Friday.  - IVF,  - NPO p/w left flank  to groin pain x 5days  Hx kidney stones  CT A/P: 5 mm sized left distal ureter stone with mild hydroureteronephrosis. Additional nonobstructive renal stones bilaterally. Prostatic enlargement.  UA & Ucx neg  Urology team following  > Continue tamsulosin 0.4 mg   >OR today for left ureteroscopy and stone removal  - IVF,  - NPO

## 2024-04-11 NOTE — DISCHARGE NOTE PROVIDER - CARE PROVIDER_API CALL
Edmund Evans  Urology  [address] [place] [address]  Phone: [phone]  Fax: [phone]  Follow Up Time: 1 week    YOKASTA CANO  Follow Up Time: 1 week

## 2024-04-11 NOTE — PROGRESS NOTE ADULT - PROBLEM SELECTOR PLAN 4
Hx of HLD, on Atorvastatin 40mg  - c/w home med Hx of DM, on Metformin 1g BID  - Hold home med   - ISS q6  - NPO

## 2024-04-11 NOTE — DISCHARGE NOTE PROVIDER - NSDCMRMEDTOKEN_GEN_ALL_CORE_FT
atorvastatin 40 mg oral tablet: 1 tab(s) orally once a day  metFORMIN 1000 mg oral tablet: 1 tab(s) orally 2 times a day   atorvastatin 40 mg oral tablet: 1 tab(s) orally once a day  metFORMIN 1000 mg oral tablet: 1 tab(s) orally 2 times a day  Percocet 5 mg-325 mg oral tablet: 1 tab(s) orally every 6 hours MDD: 20  senna leaf extract oral tablet: 2 tab(s) orally once a day (at bedtime)  tamsulosin 0.4 mg oral capsule: 1 cap(s) orally once a day (at bedtime)   atorvastatin 40 mg oral tablet: 1 tab(s) orally once a day  Flomax 0.4 mg oral capsule: 1 cap(s) orally 2 times a day  metFORMIN 1000 mg oral tablet: 1 tab(s) orally 2 times a day  Percocet 5 mg-325 mg oral tablet: 1 tab(s) orally every 6 hours MDD: 20  senna leaf extract oral tablet: 2 tab(s) orally once a day (at bedtime)

## 2024-04-11 NOTE — PROGRESS NOTE ADULT - PROBLEM SELECTOR PLAN 3
Hx of DM, on Metformin 1g BID  - Hold home med   - ISS q6  - NPO P/w SCr 2.22 on admission  Baseline SCr -unknown   Urine Lytes: Urine Na 114, Urine Cr 112, Urea Nitro 625  FeNA: 1.7%, indeterminate   Cr 2.22> 1.84  - IVF    - Monitor BMP P/w SCr 2.22 on admission  Baseline SCr -unknown   Urine Lytes: Urine Na 114, Urine Cr 112, Urea Nitro 625  FeNA: 1.7%, indeterminate   Cr 2.22>1.84>2.13  - IVF    - Monitor BMP

## 2024-04-11 NOTE — DISCHARGE NOTE PROVIDER - HOSPITAL COURSE
69 yo male with pmhx DM, HLD, kidney stones  presenting with left flank pain for past 5 days. Patient states his pain radiated from left back to left abdominal wall. He had kidney stones in the past, and this pain is similar to prior episodes. In the ED, patient was hemodynamically stable, afebrile and saturating well on room air. PE showed LT CVA tenderness, LUQ  abdominal pain. Labs showed WBC 11k, Cr 2.2. UA negative. CT A/P showed 5 mm sized left distal ureter stone with mild hydroureteronephrosis. Additional nonobstructing renal stones bilaterally. Prostatic enlargement. Urology team consulted.  Patient started on Tamsulosin 0.4mg to help pass the stone. Pain was managed with Dilaudid and Oxycodone.  Patient admitted for SHAHBAZ and kidney stones.     Since admission, patient has remained hemodynamically stable, afebrile and saturating well on room air. Urine culture was negative for bacteria growth. On 4/12,  Patient had a left ureteroscopy and stone removal done. Urology team recommend xxxxxxxxxxxxxxxxxxxxxxxxx.  Patient Cr xxxxxxxxxxxxxxx with IVF and now normalized     Patient has a history of DM and takes Metformin 1g BID at home. While inpatient, home medication was held. Patient was placed on a insulin sliding scale. Blood glucose level was well managed. For his history of HLD, patient takes  Atorvastatin 40mg daily at home. While inpatient, home medication were resumed.      Patient is feeling much better and will be discharge     Given patient's improved clinical status and current hemodynamic stability, decision was made to discharge after discussing with attending physician. Please refer to patient's complete medical chart with documents for a full hospital course, for this is only a brief summary.   69 yo male with pmhx DM, HLD, kidney stones  presenting with left flank pain for past 5 days. Patient states his pain radiated from left back to left abdominal wall. He had kidney stones in the past, and this pain is similar to prior episodes. In the ED, patient was hemodynamically stable, afebrile and saturating well on room air. PE showed LT CVA tenderness, LUQ  abdominal pain. Labs showed WBC 11k, Cr 2.2. UA negative. CT A/P showed 5 mm sized left distal ureter stone with mild hydroureteronephrosis. Additional nonobstructing renal stones bilaterally. Prostatic enlargement. Urology team consulted.  Patient started on Tamsulosin 0.4mg to help pass the stone. Pain was managed with Dilaudid and Oxycodone.  Patient admitted for SHAHBAZ and kidney stones.     Since admission, patient has remained hemodynamically stable, afebrile and saturating well on room air. Urine culture was negative for bacteria growth. On 4/12,  Patient had a left ureteroscopy and stone removal done. Urology team recommend xxxxxxxxxxxxxxxxxxxxxxxxx.  Patient Cr is downtrending  with IVF.    Patient has a history of DM and takes Metformin 1g BID at home. While inpatient, home medication was held. Patient was placed on a insulin sliding scale. Blood glucose level was well managed. For his history of HLD, patient takes  Atorvastatin 40mg daily at home. While inpatient, home medication were resumed.      Patient is feeling much better and will be discharge     Given patient's improved clinical status and current hemodynamic stability, decision was made to discharge after discussing with attending physician. Please refer to patient's complete medical chart with documents for a full hospital course, for this is only a brief summary.   67 yo male with pmhx DM, HLD, kidney stones  presenting with left flank pain for past 5 days. Patient states his pain radiated from left back to left abdominal wall. He had kidney stones in the past, and this pain is similar to prior episodes. In the ED, patient was hemodynamically stable, afebrile and saturating well on room air. PE showed LT CVA tenderness, LUQ  abdominal pain. Labs showed WBC 11k, Cr 2.2. UA negative. CT A/P showed 5 mm sized left distal ureter stone with mild hydroureteronephrosis. Additional nonobstructing renal stones bilaterally. Prostatic enlargement. Urology team consulted.  Patient started on Tamsulosin 0.4mg to help pass the stone. Pain was managed with Dilaudid and Oxycodone.  Patient admitted for SHAHBAZ and kidney stones.     Since admission, patient has remained hemodynamically stable, afebrile and saturating well on room air. Urine culture was negative for bacteria growth. On 4/12,  Patient had a left ureteroscopy and stone removal done. Patient is voiding post procedure.  Patient Cr is downtrending  with IVF.    Patient has a history of DM and takes Metformin 1g BID at home. While inpatient, home medication was held. Patient was placed on a insulin sliding scale. Blood glucose level was well managed. For his history of HLD, patient takes  Atorvastatin 40mg daily at home. While inpatient, home medication were resumed.      Patient is feeling much better and will be discharged with Tamsulosin for BPH and  Oxycodone/Acetaminophen for pain control    Given patient's improved clinical status and current hemodynamic stability, decision was made to discharge after discussing with attending physician. Please refer to patient's complete medical chart with documents for a full hospital course, for this is only a brief summary.

## 2024-04-11 NOTE — DISCHARGE NOTE PROVIDER - PROVIDER TOKENS
PROVIDER:[TOKEN:[[id number]],FOLLOWUP:[1 week]],PROVIDER:[TOKEN:[[id number]],FOLLOWUP:[1 week]]

## 2024-04-11 NOTE — DISCHARGE NOTE PROVIDER - ATTENDING DISCHARGE PHYSICAL EXAMINATION:
I, Thien Rankin, am completing and signing this document after my resident or NP has started the document. I have personally seen and examined the patient. I have collaborated with and supervised the midlevel practitioner on the discharge service for the patient. I have reviewed and made amendments to the documentation where necessary.    My physical exam on day of discharge:  Alert, answering qs appropriately, following commands  no murmurs heard  breathing comfortably  abdomen NT/ND BS+

## 2024-04-11 NOTE — DISCHARGE NOTE PROVIDER - NSDCCPCAREPLAN_GEN_ALL_CORE_FT
PRINCIPAL DISCHARGE DIAGNOSIS  Diagnosis: Kidney stone on left side  Assessment and Plan of Treatment: You presented to the hospital complaining of Left back pain that radiates to your abdomen. Labs showed no significant white blood cell for infection infection. Urinalysis was negative for infection. CT scan of the abdominal and pelvis showed 5 mm sized left distal ureter stone with mild hydroureteronephrosis. Additional nonobstructing renal stones bilaterally. Prostatic enlargement. Urology team consulted.  P      SECONDARY DISCHARGE DIAGNOSES  Diagnosis: SHAHBAZ (acute kidney injury)  Assessment and Plan of Treatment:     Diagnosis: Diabetes  Assessment and Plan of Treatment:     Diagnosis: HLD (hyperlipidemia)  Assessment and Plan of Treatment:      PRINCIPAL DISCHARGE DIAGNOSIS  Diagnosis: Kidney stone on left side  Assessment and Plan of Treatment: You presented to the hospital complaining of Left back pain that radiates to your abdomen. Labs showed no significant white blood cell for infection. Urinalysis was negative for infection. CT scan of the abdominal and pelvis showed 5 mm sized left distal ureter stone with mild hydroureteronephrosis. Additional nonobstructing renal stones bilaterally. Prostatic enlargement.  You were started on Tamsulosin for the BPH. Urology Dr. Evans saw you while you were in the hospital.  On 4/12, you were scheduled for a left ureteroscopy and stone removal done. Urology recommended   *  *  YOU WILL BE DISCHARGED ON OXYCODONE/ACETAMINOPHEN FOR PAIN.  *  *  Please follow up with your primary care physician in one week to inform them of your recent hospitalization and further management of your medical conditions.        SECONDARY DISCHARGE DIAGNOSES  Diagnosis: SHAHBAZ (acute kidney injury)  Assessment and Plan of Treatment: You were diagnosed with acute kidney injury. Your creatinine was found to be elevated around  2.22 You were treated with IV fluids to help normalize your creatine. You creatine level is downtrending . You are recommended to follow up with your Primary care physician  in a week from discharge.      Diagnosis: Diabetes  Assessment and Plan of Treatment: You have a history of diabetes.   You take Metformin 1g twice daily   While you were in the hospital, your home medication was held and you were started on insulin sliding scale.  Upon discharge, please resume your home medication  You need to continue monitoring your blood sugar levels closely and maintain healthy lifestyle by eating healthy diabetic regimen, weight loss and exercise regularly as tolerated.  Please continue to take your medications as prescribed.   Please follow up with your Primary care physician/Endocrinologist within a week of discharge.      Diagnosis: HLD (hyperlipidemia)  Assessment and Plan of Treatment: You have history of Hyperlipidemia.  You take Atorvastatin 40mg  Please take your medication as prescribed. Maintain healthy lifestyle, low fat diet, exercise regularly and check your lipid levels routinely.   Please follow up with your Primary care physician in 1 week from discharge.       PRINCIPAL DISCHARGE DIAGNOSIS  Diagnosis: Kidney stone on left side  Assessment and Plan of Treatment: You presented to the hospital complaining of Left back pain that radiates to your abdomen. Labs showed no significant white blood cell for infection. Urinalysis was negative for infection. CT scan of the abdominal and pelvis showed 5 mm sized left distal ureter stone with mild hydroureteronephrosis. Additional nonobstructing renal stones bilaterally. Prostatic enlargement.  You were started on Tamsulosin for the BPH. Urology Dr. Evans saw you while you were in the hospital.  On 4/12, you were scheduled for a left ureteroscopy and stone removal done. Urology recommended   *  *  YOU WILL BE DISCHARGED ON   OXYCODONE/ACETAMINOPHEN AS NEEDED FOR PAIN.  TAMSULOSIN FOR BPH  *  *  Please follow up with your primary care physician in one week to inform them of your recent hospitalization and further management of your medical conditions.        SECONDARY DISCHARGE DIAGNOSES  Diagnosis: SHAHBAZ (acute kidney injury)  Assessment and Plan of Treatment: You were diagnosed with acute kidney injury. Your creatinine was found to be elevated around  2.22 You were treated with IV fluids to help normalize your creatine. You creatine level is downtrending. You are recommended to follow up with your Primary care physician  in a week from discharge.      Diagnosis: Diabetes  Assessment and Plan of Treatment: You have a history of diabetes.   You take Metformin 1g twice daily   While you were in the hospital, your home medication was held and you were started on insulin sliding scale.  Upon discharge, please resume your home medication  You need to continue monitoring your blood sugar levels closely and maintain healthy lifestyle by eating healthy diabetic regimen, weight loss and exercise regularly as tolerated.  Please continue to take your medications as prescribed.   Please follow up with your Primary care physician/Endocrinologist within a week of discharge.      Diagnosis: HLD (hyperlipidemia)  Assessment and Plan of Treatment: You have history of Hyperlipidemia.  You take Atorvastatin 40mg  Please take your medication as prescribed. Maintain healthy lifestyle, low fat diet, exercise regularly and check your lipid levels routinely.   Please follow up with your Primary care physician in 1 week from discharge.       PRINCIPAL DISCHARGE DIAGNOSIS  Diagnosis: Kidney stone on left side  Assessment and Plan of Treatment: You presented to the hospital complaining of Left back pain that radiates to your abdomen. Labs showed no significant white blood cell for infection. Urinalysis was negative for infection. CT scan of the abdominal and pelvis showed 5 mm sized left distal ureter stone with mild hydroureteronephrosis. Additional nonobstructing renal stones bilaterally. Prostatic enlargement.  You were started on Tamsulosin for the BPH. Urology Dr. Evans saw you while you were in the hospital.  On 4/12/24, you had ra left ureteroscopy and stone removal procedure done. After the procedure, you were able to urinate and the pain improved   *  *  YOU WILL BE DISCHARGED ON   OXYCODONE/ACETAMINOPHEN AS NEEDED FOR PAIN.  TAMSULOSIN FOR BPH  SENNA FOR CONSTIPATION WHICH MIGHT BE CAUSED BY THE PAIN MEDICATION  *  *  Please follow up with your primary care physician in one week to inform them of your recent hospitalization and further management of your medical conditions.        SECONDARY DISCHARGE DIAGNOSES  Diagnosis: SHAHBAZ (acute kidney injury)  Assessment and Plan of Treatment: You were diagnosed with acute kidney injury. Your creatinine was found to be elevated around  2.22 You were treated with IV fluids to help normalize your creatine. You creatine level is currently going down, Your creatine level now is 1.64. You are recommended to follow up with your Primary care physician  in a week from discharge.      Diagnosis: BPH (benign prostatic hyperplasia)  Assessment and Plan of Treatment: While in the hospital a CT scan of the abdomen showed you have an enlarged prostate. This can cause difficulties with urination and is not considered to be a precurosr to prostate cancer. You was started on TAMSULOSIN during your hospital stay.  *  YOU WILL BE DISCHARGED ON TAMSULOSIN MEDICATION  *  Please continue to take the medications as directed and  follow up with your Primary care physician in a week from discharge.      Diagnosis: Diabetes  Assessment and Plan of Treatment: You have a history of diabetes.   You take Metformin 1g twice daily   While you were in the hospital, your home medication was held and you were started on insulin sliding scale.  Upon discharge, please resume your home medication  You need to continue monitoring your blood sugar levels closely and maintain healthy lifestyle by eating healthy diabetic regimen, weight loss and exercise regularly as tolerated.  Please continue to take your medications as prescribed.   Please follow up with your Primary care physician/Endocrinologist within a week of discharge.      Diagnosis: HLD (hyperlipidemia)  Assessment and Plan of Treatment: You have history of Hyperlipidemia.  You take Atorvastatin 40mg  Please take your medication as prescribed. Maintain healthy lifestyle, low fat diet, exercise regularly and check your lipid levels routinely.   Please follow up with your Primary care physician in 1 week from discharge.

## 2024-04-11 NOTE — PROGRESS NOTE ADULT - PROBLEM SELECTOR PLAN 2
P/w SCr 2.22 on admission  Baseline SCr -unknown   Urine Lytes: Urine Na 114, Urine Cr 112, Urea Nitro 625  FeNA: 1.7%, indeterminate   Cr 2.22> 1.84  - IVF    - Monitor BMP METs >4   EKG METs >4   EKG:   RCRI: 2 points, Class III Risk 10.1 %. 30-day risk of death, MI, or cardiac arrest  ROSALES: 0.1 % Risk of myocardial infarction or cardiac arrest, intraoperatively or up to 30 days post-op METs >4   EK BPM, NSR  RCRI: 2 points, Class III Risk 10.1 %. 30-day risk of death, MI, or cardiac arrest  ROSALES: 0.1 % Risk of myocardial infarction or cardiac arrest, intraoperatively or up to 30 days post-op METs >4   EK BPM, NSR  RCRI: 2 points, Class III Risk 10.1 %. 30-day risk of death, MI, or cardiac arrest  ROSALES: 0.1 % Risk of myocardial infarction or cardiac arrest, intraoperatively or up to 30 days post-op  - Patient is low to intermediate risk for low risk urology procedure.

## 2024-04-11 NOTE — DISCHARGE NOTE PROVIDER - NSDCCAREPROVSEEN_GEN_ALL_CORE_FT
Sandra, Mikie Evans, Edmund Domignuez, Janette Rodrigues, Sophia Mikie Flores, Edmund Dominguez, Janette Rodrigues, Sophia Rankin

## 2024-04-12 ENCOUNTER — RESULT REVIEW (OUTPATIENT)
Age: 69
End: 2024-04-12

## 2024-04-12 LAB
ALBUMIN SERPL ELPH-MCNC: 2.8 G/DL — LOW (ref 3.5–5)
ALP SERPL-CCNC: 93 U/L — SIGNIFICANT CHANGE UP (ref 40–120)
ALT FLD-CCNC: 30 U/L DA — SIGNIFICANT CHANGE UP (ref 10–60)
ANION GAP SERPL CALC-SCNC: 7 MMOL/L — SIGNIFICANT CHANGE UP (ref 5–17)
AST SERPL-CCNC: 19 U/L — SIGNIFICANT CHANGE UP (ref 10–40)
BILIRUB SERPL-MCNC: 0.9 MG/DL — SIGNIFICANT CHANGE UP (ref 0.2–1.2)
BUN SERPL-MCNC: 24 MG/DL — HIGH (ref 7–18)
CALCIUM SERPL-MCNC: 9.4 MG/DL — SIGNIFICANT CHANGE UP (ref 8.4–10.5)
CHLORIDE SERPL-SCNC: 104 MMOL/L — SIGNIFICANT CHANGE UP (ref 96–108)
CO2 SERPL-SCNC: 23 MMOL/L — SIGNIFICANT CHANGE UP (ref 22–31)
CREAT SERPL-MCNC: 2.07 MG/DL — HIGH (ref 0.5–1.3)
EGFR: 34 ML/MIN/1.73M2 — LOW
GLUCOSE BLDC GLUCOMTR-MCNC: 103 MG/DL — HIGH (ref 70–99)
GLUCOSE BLDC GLUCOMTR-MCNC: 109 MG/DL — HIGH (ref 70–99)
GLUCOSE BLDC GLUCOMTR-MCNC: 112 MG/DL — HIGH (ref 70–99)
GLUCOSE BLDC GLUCOMTR-MCNC: 113 MG/DL — HIGH (ref 70–99)
GLUCOSE BLDC GLUCOMTR-MCNC: 129 MG/DL — HIGH (ref 70–99)
GLUCOSE SERPL-MCNC: 116 MG/DL — HIGH (ref 70–99)
HCT VFR BLD CALC: 42.3 % — SIGNIFICANT CHANGE UP (ref 39–50)
HCV AB S/CO SERPL IA: 0.14 S/CO — SIGNIFICANT CHANGE UP (ref 0–0.99)
HCV AB SERPL-IMP: SIGNIFICANT CHANGE UP
HGB BLD-MCNC: 13.8 G/DL — SIGNIFICANT CHANGE UP (ref 13–17)
MAGNESIUM SERPL-MCNC: 2.3 MG/DL — SIGNIFICANT CHANGE UP (ref 1.6–2.6)
MCHC RBC-ENTMCNC: 28.3 PG — SIGNIFICANT CHANGE UP (ref 27–34)
MCHC RBC-ENTMCNC: 32.6 GM/DL — SIGNIFICANT CHANGE UP (ref 32–36)
MCV RBC AUTO: 86.9 FL — SIGNIFICANT CHANGE UP (ref 80–100)
NRBC # BLD: 0 /100 WBCS — SIGNIFICANT CHANGE UP (ref 0–0)
PHOSPHATE SERPL-MCNC: 3.3 MG/DL — SIGNIFICANT CHANGE UP (ref 2.5–4.5)
PLATELET # BLD AUTO: 238 K/UL — SIGNIFICANT CHANGE UP (ref 150–400)
POTASSIUM SERPL-MCNC: 4.1 MMOL/L — SIGNIFICANT CHANGE UP (ref 3.5–5.3)
POTASSIUM SERPL-SCNC: 4.1 MMOL/L — SIGNIFICANT CHANGE UP (ref 3.5–5.3)
PROT SERPL-MCNC: 7.2 G/DL — SIGNIFICANT CHANGE UP (ref 6–8.3)
RBC # BLD: 4.87 M/UL — SIGNIFICANT CHANGE UP (ref 4.2–5.8)
RBC # FLD: 14.2 % — SIGNIFICANT CHANGE UP (ref 10.3–14.5)
SODIUM SERPL-SCNC: 134 MMOL/L — LOW (ref 135–145)
WBC # BLD: 9.12 K/UL — SIGNIFICANT CHANGE UP (ref 3.8–10.5)
WBC # FLD AUTO: 9.12 K/UL — SIGNIFICANT CHANGE UP (ref 3.8–10.5)

## 2024-04-12 PROCEDURE — 88300 SURGICAL PATH GROSS: CPT | Mod: 26

## 2024-04-12 PROCEDURE — 52356 CYSTO/URETERO W/LITHOTRIPSY: CPT | Mod: LT

## 2024-04-12 PROCEDURE — 99233 SBSQ HOSP IP/OBS HIGH 50: CPT | Mod: GC

## 2024-04-12 DEVICE — URETERAL STENT PERCUFLEX PLUS 6FR 24CM
Type: IMPLANTABLE DEVICE | Site: LEFT | Status: NON-FUNCTIONAL
Removed: 2024-04-12

## 2024-04-12 DEVICE — GUIDEWIRE SENSOR DUAL-FLEX NITINOL STRAIGHT .038" X 150CM
Type: IMPLANTABLE DEVICE | Site: LEFT | Status: NON-FUNCTIONAL
Removed: 2024-04-12

## 2024-04-12 DEVICE — LASER FIBER MOSES 200 D/F/L
Type: IMPLANTABLE DEVICE | Site: LEFT | Status: NON-FUNCTIONAL
Removed: 2024-04-12

## 2024-04-12 DEVICE — URETERAL CATH OPEN END 5FR 70CM
Type: IMPLANTABLE DEVICE | Site: LEFT | Status: NON-FUNCTIONAL
Removed: 2024-04-12

## 2024-04-12 DEVICE — STONE BASKET ZEROTIP NITINOL 4-WIRE 1.9FR 120CM X 12MM
Type: IMPLANTABLE DEVICE | Site: LEFT | Status: NON-FUNCTIONAL
Removed: 2024-04-12

## 2024-04-12 RX ORDER — ACETAMINOPHEN 500 MG
1000 TABLET ORAL ONCE
Refills: 0 | Status: COMPLETED | OUTPATIENT
Start: 2024-04-12 | End: 2024-04-12

## 2024-04-12 RX ORDER — OXYCODONE AND ACETAMINOPHEN 5; 325 MG/1; MG/1
1 TABLET ORAL
Qty: 20 | Refills: 0
Start: 2024-04-12 | End: 2024-04-16

## 2024-04-12 RX ADMIN — TAMSULOSIN HYDROCHLORIDE 0.4 MILLIGRAM(S): 0.4 CAPSULE ORAL at 22:17

## 2024-04-12 RX ADMIN — SODIUM CHLORIDE 85 MILLILITER(S): 9 INJECTION, SOLUTION INTRAVENOUS at 06:50

## 2024-04-12 RX ADMIN — Medication 1000 MILLIGRAM(S): at 01:45

## 2024-04-12 RX ADMIN — HYDROMORPHONE HYDROCHLORIDE 0.5 MILLIGRAM(S): 2 INJECTION INTRAMUSCULAR; INTRAVENOUS; SUBCUTANEOUS at 07:20

## 2024-04-12 RX ADMIN — SENNA PLUS 2 TABLET(S): 8.6 TABLET ORAL at 22:17

## 2024-04-12 RX ADMIN — HYDROMORPHONE HYDROCHLORIDE 0.5 MILLIGRAM(S): 2 INJECTION INTRAMUSCULAR; INTRAVENOUS; SUBCUTANEOUS at 12:25

## 2024-04-12 RX ADMIN — Medication 400 MILLIGRAM(S): at 00:48

## 2024-04-12 RX ADMIN — HYDROMORPHONE HYDROCHLORIDE 0.5 MILLIGRAM(S): 2 INJECTION INTRAMUSCULAR; INTRAVENOUS; SUBCUTANEOUS at 13:10

## 2024-04-12 RX ADMIN — HYDROMORPHONE HYDROCHLORIDE 0.5 MILLIGRAM(S): 2 INJECTION INTRAMUSCULAR; INTRAVENOUS; SUBCUTANEOUS at 06:50

## 2024-04-12 NOTE — PROGRESS NOTE ADULT - ATTENDING SUPERVISION STATEMENT
Speech-Language Pathology Bedside Swallow Evaluation      Patient Name: Deanna Ybarra    Today's Date: 7/16/2020     Problem List  Principal Problem:    Aspiration pneumonia ([facility] [address] )  Active Problems:    Asthma    Intellectual disability    Schizo affective schizophrenia ([facility] [address] )    Hyperlipidemia    Hypertension    Disease of thyroid gland    Acute on chronic respiratory failure with hypercapnia (HCC)    Bronchospasm    Interstitial lung disease ([address] )      Past Medical History  Past Medical History:   Diagnosis Date    Bradycardia     COPD (chronic obstructive pulmonary disease) ([facility] [address] )     uses O2 at 1L every 24 hrs   Disease of thyroid gland     hypo    Hyperlipidemia     Hypertension     Intellectual disability     verbal    Schizo affective schizophrenia [facility])        Past Surgical History  Past Surgical History:   Procedure Laterality Date    INSERT / REPLACE / REMOVE PACEMAKER  2009    MI DENTAL SURGERY PROCEDURE N/A 1/15/2019    Procedure: COMPLETE ORAL REHABILITATION: FULL MOUTH DENTAL X-RAYS, PLANING AND SCALING, COMPOSITE RESTORATIONS: #7-MFL, #5-O, EXTRACTION: #4, AMALGAM RESTORATION: #31-B,;  Surgeon: Stephanie Fairchild DMD;  Location: [facility];  Service: Oral Surgery       Summary   Pt presented with s/s suggestive of moderate oral and suspected mild pharyngeal dysphagia  Symptoms or concerns included decreased mastication and suspected decreased control of foods  cough with textured foods  Risk/s for Aspiration: present     Recommended Diet: puree/level 1 diet and thin liquids   Recommended Form of Meds: as tolerated   Aspiration precautions and swallowing strategies: upright posture, only feed when fully alert and slow rate of feeding    Current Medical Status  Deanna Ybarra is a 60 yo F c h/o with intellectual disability COPD, hyperlipidemia , hypertension, thyroid disease , schizoaffective disorder/schizophrenia who presented 7/10 with respiratory distressed after dental procedure     DX:
* Acute respiratory failure with hypercapnia (HCC)  Assessment & Plan  Likely secondary to pulmonary fibrosis versus aspiration pneumonia versus overlap  Patient arrived intubated from her dental surgery after failure and extubation  Patient is known to be on 1 L O2 at baseline  Patient has pulmonary fibrosis with suspected right middle lobe aspiration pneumonia on CT scan  ABG on arrival shows respiratory acidemia with pH of 7 338 and pCO2 of 64       Aspiration pneumonia (HCC)  Assessment & Plan  Likely from the extubation process after the dental procedure  -continue with Zosyn  - WBC normal on admission however will need to be monitored closely  - sputum culture pending     COPD (chronic obstructive pulmonary disease) ([address] )  Assessment & Plan  Patient said to be on 1 L oxygen at baseline  Continue with home Pulmicort   Disease of thyroid gland  Assessment & Plan  - continue with home levothyroxine 75 mcg     Hypertension  Hyperlipidemia   Schizo affective schizophrenia (HCC)   Intellectual disability    Pt was extubated 7/15  Swallowing Evaluation ordered last pm and completed bedside this am      Current Precautions:  Fall    Past medical history:  Please see H&P for details  ET tube terminates 3 4 cm above toribio  Lines and tubes otherwise stable  Special Studies:  Most recent CXR 7/13: Stable patchy pneumonia and trace right pleural effusion  Social/Education/Vocational Hx:  Pt lives in group home    Swallow Information   Current Risks for Dysphagia & Aspiration: recent intubation  Current Diet: NPO   Baseline Diet: regular diet and thin liquids      Baseline Assessment   Behavior/Cognition: alert  Speech/Language Status: able to follow commands inconsistently  Patient Positioning: upright in bed  Pain Status/Interventions/Response to Interventions: No report of or nonverbal indications of pain         Swallow Mechanism Exam  Facial: symmetrical  Labial: WFL  Lingual: right sided tongue
deviation, large appearing tongue  Velum: difficult to visualize with large tongue  Mandible:  decreased ROM  Dentition: Missing numerous posterior teeth on the upper gum ridge  Vocal quality:weak, significantly reduced volume  Respiratory Status: on 2 L O2 with baseline O2 sat of 94%       Consistencies Assessed and Performance   Consistencies Administered: thin liquids, nectar thick, puree, mechanical soft solids and soft solids  Materials administered included nectar thick and thin apple juice, apple sauce, minimum of peaches, linda cracker alone and mixed with apple sauce    Oral Stage: moderate  Mastication was attempted and grossly achieved with lingual mashing  Bolus formation and transfer were characterized by some lingual pumping  No significant oral residue noted  Regarding oral control, suspect reduced oral control with food related to tongue pumping  Pharyngeal Stage: mild  Swallow Mechanics:  Swallowing initiation appeared prompt with liquids  Laryngeal rise was palpated and judged to be fair  Significant/distressful cough and O2 desaturation to 91% noted with linda cracker  No coughing, throat clearing, change in vocal quality or respiratory status noted with other materials today  Esophageal Concerns: none reported    Summary and Recommendations (see above)    Results Reviewed with: patient and RN     Treatment Recommended: yes 2-3x weekly    Dysphagia LTG per SLP  -Patient will demonstrate optimum safety and efficacy of oral intake and swallowing function without overt s/sx of penetration or aspiration for the highest appropriate diet level       Short Term Goals:  -Pt will tolerate Dysphagia 1/pureed diet and thin liquid with no significant s/s oral or pharyngeal dysphagia across 1-2 diagnostic session/s    -Patient will tolerate trials of upgraded food and thin liquid texture with no significant s/s of oral or pharyngeal dysphagia including aspiration across 1-3 diagnostic sessions
Speech Therapy Prognosis   Prognosis: good for diet upgrade    Prognosis Considerations: age, therapeutic potential and no previous history of dysphagia on regular diet    William Chilel [id number] [phone]  Available via [facility] Text
Resident

## 2024-04-12 NOTE — PROGRESS NOTE ADULT - PROBLEM SELECTOR PLAN 2
METs >4   EK BPM, NSR  RCRI: 2 points, Class III Risk 10.1 %. 30-day risk of death, MI, or cardiac arrest  ROSALES: 0.1 % Risk of myocardial infarction or cardiac arrest, intraoperatively or up to 30 days post-op  - Patient is low to intermediate risk for low risk urology procedure.

## 2024-04-12 NOTE — PROGRESS NOTE ADULT - ATTENDING COMMENTS
67 yo male with obstructing 5 mm ureteral stone with SHAHBAZ on CKD. His Cr has been stable but increased to 2.2. The patient has negative urine culture. Will plan to take the patient to the OR for stent and possible ureteroscopy for stone extraction. Discussed with patient that he will be added on to the schedule. Patient to continue on MET with tamsulosin and can avoid surgical intervention if stone passes.
:  Ewa [id number]    S: reports recurrence of left flank pain. + nausea  At time of bedside visit, pt was still pending  procedure    Labs reviewed  creatinine 2.07<--2.13<--1.84    UA - unremarkable    CT abdomen/pelvis  5 mm sized left distal ureter stone with mild hydroureteronephrosis.  Additional non-obstructing renal stones bilaterally.  Prostatic enlargement.    Impression   68 year old man with hx of DM, HLD nephrolithiasis admitted with left flank pain in the setting  of Left ureteral stone-5mm- failed medical expulsive therapy    #Left obstructing ureteral nephrolithiasis  #Radha- secondary to obstructing process  #preoperative assessment  #DM T II  #HLD    Plan     -planned for cystoscopy with stent on 4/12/24  -pain management with current regimen  -urine culture negative  -RCRI class 2, Buckner 0.1%. METS> 4 with no physical exam findings suggestive of ChF/cardiac decompensation or aortic stenosis  Preoperative assessment- this is an intermediate risk patient planned for a low risk procedure- no further cardiac evaluation   likely weekend discharge post  intervention
Patient seen/evaluated at bedside on 4/11/2024. I agree with the resident progress note/outlined plan of care. My independent findings and conclusions are documented.    : David [id number]  S: reports recurrence of flank pain- requesting additional pain medication- agreed to IV tylenol. No nausea/vomiting    Pt can ascend 2 flights of stairs or more w/out stopping. He can ambulate more than 2 blocks briskly w/out difficulty. He denies chest pain, sob, orthopnea, PND, LE edema    Labs reviewed  creatinine-2.13<--1.84    UA - unremarkable    CT abdomen/pelvis  5 mm sized left distal ureter stone with mild hydroureteronephrosis.  Additional non-obstructing renal stones bilaterally.  Prostatic enlargement.    Impression   68 year old man with hx of DM, HLD nephrolithiasis admitted with left flank pain for the past week and noted with multiple ureteral stones and hydroureteronephrosis likely telltale of recent obstructing renal stones or possible related to BPH     #Left obstructing ureteral nephrolithiasis  #Radha- secondary to obstructing process  #preoperative assessment  #DM T II  #HLD    Plan     -planned for cystoscopy with stent on 4/12/24  -pain management with current regimen  -urine culture negative  -RCRI class 2, Buckner 0.1%. METS> 4 with no physical exam findings suggestive of ChF/cardiac decompensation or aortic stenosis  Preoperative assessment- this is an intermediate risk patient planned for a low risk procedure- no further cardiac evaluation

## 2024-04-12 NOTE — PROGRESS NOTE ADULT - PROBLEM SELECTOR PLAN 3
P/w SCr 2.22 on admission  Baseline SCr -unknown   Urine Lytes: Urine Na 114, Urine Cr 112, Urea Nitro 625  FeNA: 1.7%, indeterminate   Cr 2.22>1.84>2.13  - IVF    - Monitor BMP P/w SCr 2.22 on admission  Baseline SCr -unknown   Urine Lytes: Urine Na 114, Urine Cr 112, Urea Nitro 625  FeNA: 1.7%, indeterminate   Cr 2.22>1.84>2.13>2.07  - IVF    - Monitor BMP

## 2024-04-12 NOTE — PROGRESS NOTE ADULT - PROBLEM SELECTOR PLAN 1
p/w left flank  to groin pain x 5days  Hx kidney stones  CT A/P: 5 mm sized left distal ureter stone with mild hydroureteronephrosis. Additional nonobstructive renal stones bilaterally. Prostatic enlargement.  UA & Ucx neg  Urology team following  > Continue tamsulosin 0.4 mg   >OR today for left ureteroscopy and stone removal  - IVF,  - NPO

## 2024-04-13 ENCOUNTER — TRANSCRIPTION ENCOUNTER (OUTPATIENT)
Age: 69
End: 2024-04-13

## 2024-04-13 VITALS
TEMPERATURE: 98 F | HEART RATE: 104 BPM | SYSTOLIC BLOOD PRESSURE: 127 MMHG | DIASTOLIC BLOOD PRESSURE: 81 MMHG | RESPIRATION RATE: 18 BRPM | OXYGEN SATURATION: 98 %

## 2024-04-13 LAB
ALBUMIN SERPL ELPH-MCNC: 2.9 G/DL — LOW (ref 3.5–5)
ALP SERPL-CCNC: 94 U/L — SIGNIFICANT CHANGE UP (ref 40–120)
ALT FLD-CCNC: 27 U/L DA — SIGNIFICANT CHANGE UP (ref 10–60)
ANION GAP SERPL CALC-SCNC: 7 MMOL/L — SIGNIFICANT CHANGE UP (ref 5–17)
AST SERPL-CCNC: 22 U/L — SIGNIFICANT CHANGE UP (ref 10–40)
BILIRUB SERPL-MCNC: 0.8 MG/DL — SIGNIFICANT CHANGE UP (ref 0.2–1.2)
BUN SERPL-MCNC: 23 MG/DL — HIGH (ref 7–18)
CALCIUM SERPL-MCNC: 8.8 MG/DL — SIGNIFICANT CHANGE UP (ref 8.4–10.5)
CHLORIDE SERPL-SCNC: 105 MMOL/L — SIGNIFICANT CHANGE UP (ref 96–108)
CO2 SERPL-SCNC: 25 MMOL/L — SIGNIFICANT CHANGE UP (ref 22–31)
CREAT SERPL-MCNC: 1.64 MG/DL — HIGH (ref 0.5–1.3)
EGFR: 45 ML/MIN/1.73M2 — LOW
GLUCOSE BLDC GLUCOMTR-MCNC: 105 MG/DL — HIGH (ref 70–99)
GLUCOSE BLDC GLUCOMTR-MCNC: 156 MG/DL — HIGH (ref 70–99)
GLUCOSE SERPL-MCNC: 218 MG/DL — HIGH (ref 70–99)
HCT VFR BLD CALC: 43 % — SIGNIFICANT CHANGE UP (ref 39–50)
HGB BLD-MCNC: 14.4 G/DL — SIGNIFICANT CHANGE UP (ref 13–17)
MAGNESIUM SERPL-MCNC: 2.1 MG/DL — SIGNIFICANT CHANGE UP (ref 1.6–2.6)
MCHC RBC-ENTMCNC: 28.5 PG — SIGNIFICANT CHANGE UP (ref 27–34)
MCHC RBC-ENTMCNC: 33.5 GM/DL — SIGNIFICANT CHANGE UP (ref 32–36)
MCV RBC AUTO: 85 FL — SIGNIFICANT CHANGE UP (ref 80–100)
NRBC # BLD: 0 /100 WBCS — SIGNIFICANT CHANGE UP (ref 0–0)
PHOSPHATE SERPL-MCNC: 3 MG/DL — SIGNIFICANT CHANGE UP (ref 2.5–4.5)
PLATELET # BLD AUTO: 259 K/UL — SIGNIFICANT CHANGE UP (ref 150–400)
POTASSIUM SERPL-MCNC: 4.3 MMOL/L — SIGNIFICANT CHANGE UP (ref 3.5–5.3)
POTASSIUM SERPL-SCNC: 4.3 MMOL/L — SIGNIFICANT CHANGE UP (ref 3.5–5.3)
PROT SERPL-MCNC: 7.5 G/DL — SIGNIFICANT CHANGE UP (ref 6–8.3)
RBC # BLD: 5.06 M/UL — SIGNIFICANT CHANGE UP (ref 4.2–5.8)
RBC # FLD: 14.1 % — SIGNIFICANT CHANGE UP (ref 10.3–14.5)
SODIUM SERPL-SCNC: 137 MMOL/L — SIGNIFICANT CHANGE UP (ref 135–145)
WBC # BLD: 9.42 K/UL — SIGNIFICANT CHANGE UP (ref 3.8–10.5)
WBC # FLD AUTO: 9.42 K/UL — SIGNIFICANT CHANGE UP (ref 3.8–10.5)

## 2024-04-13 PROCEDURE — 84540 ASSAY OF URINE/UREA-N: CPT

## 2024-04-13 PROCEDURE — 82570 ASSAY OF URINE CREATININE: CPT

## 2024-04-13 PROCEDURE — 84300 ASSAY OF URINE SODIUM: CPT

## 2024-04-13 PROCEDURE — 85610 PROTHROMBIN TIME: CPT

## 2024-04-13 PROCEDURE — 84100 ASSAY OF PHOSPHORUS: CPT

## 2024-04-13 PROCEDURE — 86900 BLOOD TYPING SEROLOGIC ABO: CPT

## 2024-04-13 PROCEDURE — 86850 RBC ANTIBODY SCREEN: CPT

## 2024-04-13 PROCEDURE — 96375 TX/PRO/DX INJ NEW DRUG ADDON: CPT

## 2024-04-13 PROCEDURE — 74176 CT ABD & PELVIS W/O CONTRAST: CPT | Mod: MC

## 2024-04-13 PROCEDURE — C1769: CPT

## 2024-04-13 PROCEDURE — 88300 SURGICAL PATH GROSS: CPT

## 2024-04-13 PROCEDURE — 93005 ELECTROCARDIOGRAM TRACING: CPT

## 2024-04-13 PROCEDURE — 81001 URINALYSIS AUTO W/SCOPE: CPT

## 2024-04-13 PROCEDURE — 36415 COLL VENOUS BLD VENIPUNCTURE: CPT

## 2024-04-13 PROCEDURE — 86803 HEPATITIS C AB TEST: CPT

## 2024-04-13 PROCEDURE — 99285 EMERGENCY DEPT VISIT HI MDM: CPT

## 2024-04-13 PROCEDURE — 85025 COMPLETE CBC W/AUTO DIFF WBC: CPT

## 2024-04-13 PROCEDURE — 85730 THROMBOPLASTIN TIME PARTIAL: CPT

## 2024-04-13 PROCEDURE — 86901 BLOOD TYPING SEROLOGIC RH(D): CPT

## 2024-04-13 PROCEDURE — 80053 COMPREHEN METABOLIC PANEL: CPT

## 2024-04-13 PROCEDURE — 85027 COMPLETE CBC AUTOMATED: CPT

## 2024-04-13 PROCEDURE — C1889: CPT

## 2024-04-13 PROCEDURE — C2617: CPT

## 2024-04-13 PROCEDURE — 87086 URINE CULTURE/COLONY COUNT: CPT

## 2024-04-13 PROCEDURE — 99233 SBSQ HOSP IP/OBS HIGH 50: CPT

## 2024-04-13 PROCEDURE — C1758: CPT

## 2024-04-13 PROCEDURE — 96374 THER/PROPH/DIAG INJ IV PUSH: CPT

## 2024-04-13 PROCEDURE — 99239 HOSP IP/OBS DSCHRG MGMT >30: CPT | Mod: GC

## 2024-04-13 PROCEDURE — 83036 HEMOGLOBIN GLYCOSYLATED A1C: CPT

## 2024-04-13 PROCEDURE — 76000 FLUOROSCOPY <1 HR PHYS/QHP: CPT

## 2024-04-13 PROCEDURE — 82365 CALCULUS SPECTROSCOPY: CPT

## 2024-04-13 PROCEDURE — 82962 GLUCOSE BLOOD TEST: CPT

## 2024-04-13 PROCEDURE — 80048 BASIC METABOLIC PNL TOTAL CA: CPT

## 2024-04-13 PROCEDURE — 83735 ASSAY OF MAGNESIUM: CPT

## 2024-04-13 RX ORDER — TAMSULOSIN HYDROCHLORIDE 0.4 MG/1
1 CAPSULE ORAL
Qty: 60 | Refills: 0
Start: 2024-04-13 | End: 2024-05-12

## 2024-04-13 RX ORDER — SODIUM CHLORIDE 9 MG/ML
1000 INJECTION, SOLUTION INTRAVENOUS
Refills: 0 | Status: DISCONTINUED | OUTPATIENT
Start: 2024-04-13 | End: 2024-04-13

## 2024-04-13 RX ORDER — SENNA PLUS 8.6 MG/1
2 TABLET ORAL
Qty: 10 | Refills: 0
Start: 2024-04-13 | End: 2024-04-17

## 2024-04-13 RX ORDER — TAMSULOSIN HYDROCHLORIDE 0.4 MG/1
0.4 CAPSULE ORAL
Refills: 0 | Status: DISCONTINUED | OUTPATIENT
Start: 2024-04-13 | End: 2024-04-13

## 2024-04-13 RX ORDER — TAMSULOSIN HYDROCHLORIDE 0.4 MG/1
1 CAPSULE ORAL
Qty: 30 | Refills: 0
Start: 2024-04-13 | End: 2024-05-12

## 2024-04-13 RX ADMIN — Medication 1: at 12:03

## 2024-04-13 RX ADMIN — OXYCODONE HYDROCHLORIDE 10 MILLIGRAM(S): 5 TABLET ORAL at 15:23

## 2024-04-13 NOTE — PROGRESS NOTE ADULT - PROBLEM SELECTOR PLAN 1
p/w left flank  to groin pain x 5days  Hx kidney stones  CT A/P: 5 mm sized left distal ureter stone with mild hydroureteronephrosis. Additional nonobstructive renal stones bilaterally. Prostatic enlargement.  Started on tamsulosin 0.4 mg   UA & Ucx neg  s/p left ureteroscopy and stone removal on 4/12  Urology team following  - c/w tamsulosin 0.4 mg

## 2024-04-13 NOTE — DISCHARGE NOTE NURSING/CASE MANAGEMENT/SOCIAL WORK - NSDCPEFALRISK_GEN_ALL_CORE
For information on Fall & Injury Prevention, visit: [email or web address] OR  [email or web address] OR  https://www.cdc.gov/steadi/patient.html

## 2024-04-13 NOTE — PROGRESS NOTE ADULT - PROBLEM SELECTOR PLAN 3
Hx of DM, on Metformin 1g BID  Hold home med   - ISS q6  - NPO Hx of DM, on Metformin 1g BID  Hold home med   - ISS q6  - Diabetic diet

## 2024-04-13 NOTE — PROGRESS NOTE ADULT - NS ATTEND AMEND GEN_ALL_CORE FT
69 y/o male with PMHx of HLD, DM , Kidney Stones x 2 presents to the ED with flank pain x 5 days . Pain is mostly in the left flank described as sharp , persistent , 8/10 radiating to the left low abdomen similar to his former kidney stones in the past. Afebrile, labs wnl except CKD , CT shows 5 mm sized left distal ureter stone with mild hydroureteronephrosis.     Plan  NPO with IV fluids  Continue tamsulosin for MET  OR for ureteroscopy and stent insertion  Trend Cr
67 yo male with obstructing stone and BPH with SHAHBAZ on CKD. He had ureteroscopy and stent removal. His Cr is now improving. He will have TOV today with  PVR to ensure patient is emptying bladder as his prostate is enlarged. He can be discharged on tamsulosin 0.8mg daily and finasteride 5 mg daily and follow up in 2 weeks for stent removal

## 2024-04-13 NOTE — PROGRESS NOTE ADULT - PROBLEM SELECTOR PLAN 2
P/w SCr 2.22 on admission  Baseline SCr -unknown   Urine Lytes: Urine Na 114, Urine Cr 112, Urea Nitro 625  FeNA: 1.7%, indeterminate   Cr 2.22>1.84>2.13>2.07  - IVF    - Monitor BMP P/w SCr 2.22 on admission  Baseline SCr -unknown   Urine Lytes: Urine Na 114, Urine Cr 112, Urea Nitro 625  FeNA: 1.7%, indeterminate   Cr 2.22>1.84>2.13>2.07> 1.64 downtrending   - IVF    - Monitor BMP

## 2024-04-13 NOTE — PROGRESS NOTE ADULT - SUBJECTIVE AND OBJECTIVE BOX
Subjective  no acute events.     Objective    Vital signs  T(F): , Max: 98.2 (04-11-24 @ 04:59)  HR: 84 (04-11-24 @ 11:58)  BP: 100/63 (04-11-24 @ 11:58)  SpO2: 95% (04-11-24 @ 11:58)  Wt(kg): --    Output       Gen: NAD  Abd: soft, nontender, nondistended  : voiding     Labs      04-11 @ 08:15    WBC 9.78  / Hct 41.7  / SCr 2.13     04-10 @ 11:00    WBC --    / Hct --    / SCr 1.84         Culture - Urine (collected 04-10-24 @ 01:02)  Source: Clean Catch Clean Catch (Midstream)  Final Report (04-11-24 @ 09:47):    <10,000 CFU/mL Normal Urogenital Maddison        Urine Cx: ?  Blood Cx: ?    Imaging
S/p left ureteral stent 4/12  Patient seen and examined at bedside    Vital Signs Last 24 Hrs  T(F): 98.2 (04-13-24 @ 04:57), Max: 98.4 (04-12-24 @ 20:40)  HR: 83 (04-13-24 @ 04:57)  BP: 117/70 (04-13-24 @ 04:57)  RR: 18 (04-13-24 @ 04:57)  SpO2: 97% (04-13-24 @ 04:57)  POCT Blood Glucose.: 105 mg/dL (13 Apr 2024 08:03)    GENERAL: Alert, NAD  CHEST/LUNG: respirations nonlabored  ABDOMEN: soft, Nontender, Nondistended  : ahmadi catheter in place draining clear yellow urine   EXTREMITIES:  no calf tenderness, No edema    I&O's Detail    12 Apr 2024 07:01  -  13 Apr 2024 07:00  --------------------------------------------------------  IN:  Total IN: 0 mL    OUT:    Voided (mL): 2260 mL  Total OUT: 2260 mL    Total NET: -2260 mL    LABS:                        14.4   9.42  )-----------( 259      ( 13 Apr 2024 09:35 )             43.0     04-13    137  |  105  |  23<H>  ----------------------------<  218<H>  4.3   |  25  |  1.64<H>    Ca    8.8      13 Apr 2024 09:35  Phos  3.0     04-13  Mg     2.1     04-13    TPro  7.5  /  Alb  2.9<L>  /  TBili  0.8  /  DBili  x   /  AST  22  /  ALT  27  /  AlkPhos  94  04-13    
INTERVAL HPI/OVERNIGHT EVENTS:    Pt seen and examined at bedside. The patient has no acute complaints, resting comfortably in bed.     Vital Signs Last 24 Hrs  T(C): 36.9 (12 Apr 2024 04:45), Max: 36.9 (12 Apr 2024 04:45)  T(F): 98.5 (12 Apr 2024 04:45), Max: 98.5 (12 Apr 2024 04:45)  HR: 79 (12 Apr 2024 04:45) (79 - 84)  BP: 108/64 (12 Apr 2024 04:45) (100/63 - 108/64)  BP(mean): --  RR: 18 (12 Apr 2024 04:45) (18 - 18)  SpO2: 95% (12 Apr 2024 04:45) (95% - 96%)    Parameters below as of 12 Apr 2024 04:45  Patient On (Oxygen Delivery Method): room air      I&O's Detail    polyethylene glycol 3350 17 Gram(s) Oral daily  senna 2 Tablet(s) Oral at bedtime  tamsulosin 0.4 milliGRAM(s) Oral at bedtime      Physical Exam  General: No acute distress  Resp: non labored  Abdomen: soft,  nondistended, nontender  : voiding without issue      Drains/Tubes:     Labs:                        13.5   9.78  )-----------( 208      ( 11 Apr 2024 08:15 )             41.7     04-11    132<L>  |  104  |  24<H>  ----------------------------<  161<H>  4.3   |  21<L>  |  2.13<H>    Ca    8.9      11 Apr 2024 08:15  Phos  2.9     04-11  Mg     2.2     04-11    TPro  7.3  /  Alb  3.1<L>  /  TBili  1.6<H>  /  DBili  x   /  AST  20  /  ALT  34  /  AlkPhos  101  04-10        RADIOLOGY & ADDITIONAL STUDIES:    68yMale
PGY-1 Progress Note discussed with attending    PAGER #: [[phone]] TILL 5:00 PM  PLEASE CONTACT ON CALL TEAM:  - On Call Team (Please refer to Cooper) FROM 5:00 PM - 8:30PM  - Nightfloat Team FROM 8:30 -7:30 AM    INTERVAL HPI/OVERNIGHT EVENTS:   No overnight events. Patient was examined in the morning at bedside with   Benji (ID: [id number]). Patient reports that his LT lower back and abdominal  pain has improved significantly but not resolved. Patient denies any other symptoms     REVIEW OF SYSTEMS:  CONSTITUTIONAL: No fever, chills,  or fatigue  RESPIRATORY: No cough, wheezing, No shortness of breath  CARDIOVASCULAR: No chest pain, palpitations,  leg swelling  GASTROINTESTINAL: + abdominal pain. No nausea, vomiting, or hematemesis; No diarrhea or constipation. No bloody or black stool  GENITOURINARY: No dysuria or hematuria, urinary frequency  MSK: + LT lower back pain   NEUROLOGICAL: No headaches, dizziness  SKIN: No itching, burning, rashes, or lesions       Vital Signs Last 24 Hrs  T(C): 36.8 (13 Apr 2024 04:57), Max: 36.9 (12 Apr 2024 20:40)  T(F): 98.2 (13 Apr 2024 04:57), Max: 98.4 (12 Apr 2024 20:40)  HR: 83 (13 Apr 2024 04:57) (74 - 91)  BP: 117/70 (13 Apr 2024 04:57) (110/75 - 160/68)  BP(mean): 86 (13 Apr 2024 04:57) (80 - 92)  RR: 18 (13 Apr 2024 04:57) (11 - 22)  SpO2: 97% (13 Apr 2024 04:57) (94% - 97%)    Parameters below as of 13 Apr 2024 04:57  Patient On (Oxygen Delivery Method): room air        PHYSICAL EXAMINATION:  GENERAL: NAD,   HEAD:  Atraumatic, Normocephalic  EYES: ,  conjunctiva and sclera clear  NECK: Supple,    CHEST WALL: Nontender  LUNG: Clear to auscultation. No rales, rhonchi, wheezing, or rubs  HEART: Regular rate and rhythm; No murmurs,  ABDOMEN: + Mild  LUQ and LLQ abdominal tenderness. + Distention. Soft, Bowel sounds present, No Rovsing's sign   CVA: + Mild  LT CVA tenderness   NERVOUS SYSTEM:  Alert & Oriented X3,    EXTREMITIES:  2+ Peripheral Pulses, No clubbing, cyanosis, or edema  CALF: No Calf tenderness   SKIN: warm dry                                       14.4   9.42  )-----------( 259      ( 13 Apr 2024 09:35 )             43.0     04-13    137  |  105  |  23<H>  ----------------------------<  218<H>  4.3   |  25  |  1.64<H>    Ca    8.8      13 Apr 2024 09:35  Phos  3.0     04-13  Mg     2.1     04-13    TPro  7.5  /  Alb  2.9<L>  /  TBili  0.8  /  DBili  x   /  AST  22  /  ALT  27  /  AlkPhos  94  04-13    LIVER FUNCTIONS - ( 13 Apr 2024 09:35 )  Alb: 2.9 g/dL / Pro: 7.5 g/dL / ALK PHOS: 94 U/L / ALT: 27 U/L DA / AST: 22 U/L / GGT: x                   CAPILLARY BLOOD GLUCOSE      RADIOLOGY & ADDITIONAL TESTS:                  
PGY-1 Progress Note discussed with attending    PAGER #: [[phone]] TILL 5:00 PM  PLEASE CONTACT ON CALL TEAM:  - On Call Team (Please refer to Cooper) FROM 5:00 PM - 8:30PM  - Nightfloat Team FROM 8:30 -7:30 AM    INTERVAL HPI/OVERNIGHT EVENTS:   No overnight events. Patient was examined in the morning at bedside with  Stephanie (ID: [id number]). Patient still endorses LT lower back pain that radiates to his LUQ and LLQ of the abdomen. Patient denies any other symptoms       REVIEW OF SYSTEMS:  CONSTITUTIONAL: No fever, chills,  or fatigue  RESPIRATORY: No cough, wheezing, No shortness of breath  CARDIOVASCULAR: No chest pain, palpitations,  leg swelling  GASTROINTESTINAL: + abdominal pain. No nausea, vomiting, or hematemesis; No diarrhea or constipation. No bloody or black stool  GENITOURINARY: No dysuria or hematuria, urinary frequency  MSK: + LT lower back pain   NEUROLOGICAL: No headaches, dizziness  SKIN: No itching, burning, rashes, or lesions     Vital Signs Last 24 Hrs  T(C): 36.8 (12 Apr 2024 11:46), Max: 36.9 (12 Apr 2024 04:45)  T(F): 98.3 (12 Apr 2024 11:46), Max: 98.5 (12 Apr 2024 04:45)  HR: 85 (12 Apr 2024 11:46) (79 - 85)  BP: 160/68 (12 Apr 2024 11:46) (106/60 - 160/68)  BP(mean): --  RR: 18 (12 Apr 2024 11:46) (18 - 18)  SpO2: 95% (12 Apr 2024 11:46) (95% - 96%)    Parameters below as of 12 Apr 2024 11:46  Patient On (Oxygen Delivery Method): room air    PHYSICAL EXAMINATION:  GENERAL: NAD,   HEAD:  Atraumatic, Normocephalic  EYES: ,  conjunctiva and sclera clear  NECK: Supple,    CHEST WALL: Nontender  LUNG: Clear to auscultation. No rales, rhonchi, wheezing, or rubs  HEART: Regular rate and rhythm; No murmurs,  ABDOMEN: + LUQ and LLQ abdominal tenderness. + Distention. Soft, Bowel sounds present, No Rovsing's sign   CVA: + LT CVA tenderness   NERVOUS SYSTEM:  Alert & Oriented X3,    EXTREMITIES:  2+ Peripheral Pulses, No clubbing, cyanosis, or edema  CALF: No Calf tenderness   SKIN: warm dry                            13.8   9.12  )-----------( 238      ( 12 Apr 2024 07:58 )             42.3     04-12    134<L>  |  104  |  24<H>  ----------------------------<  116<H>  4.1   |  23  |  2.07<H>    Ca    9.4      12 Apr 2024 07:58  Phos  3.3     04-12  Mg     2.3     04-12    TPro  7.2  /  Alb  2.8<L>  /  TBili  0.9  /  DBili  x   /  AST  19  /  ALT  30  /  AlkPhos  93  04-12    LIVER FUNCTIONS - ( 12 Apr 2024 07:58 )  Alb: 2.8 g/dL / Pro: 7.2 g/dL / ALK PHOS: 93 U/L / ALT: 30 U/L DA / AST: 19 U/L / GGT: x                   CAPILLARY BLOOD GLUCOSE      RADIOLOGY & ADDITIONAL TESTS:                  
PGY-1 Progress Note discussed with attending    PAGER #: [[phone]] TILL 5:00 PM  PLEASE CONTACT ON CALL TEAM:  - On Call Team (Please refer to Cooper) FROM 5:00 PM - 8:30PM  - Nightfloat Team FROM 8:30 -7:30 AM    INTERVAL HPI/OVERNIGHT EVENTS:   No overnight events. Patient was examined in the morning at bedside with  Yesenia (ID: [id number]). Patient still endorses LT lower back pain that radiates to his LUQ and LLQ of the abdomen. Patient denies any other symptoms     REVIEW OF SYSTEMS:  CONSTITUTIONAL: No fever, chills,  or fatigue  RESPIRATORY: No cough, wheezing, No shortness of breath  CARDIOVASCULAR: No chest pain, palpitations,  leg swelling  GASTROINTESTINAL: + abdominal pain. No nausea, vomiting, or hematemesis; No diarrhea or constipation. No bloody or black stool  GENITOURINARY: No dysuria or hematuria, urinary frequency  MSK: + LT lower back pain   NEUROLOGICAL: No headaches, dizziness  SKIN: No itching, burning, rashes, or lesions     Vital Signs Last 24 Hrs  T(C): 36.8 (11 Apr 2024 11:58), Max: 36.8 (11 Apr 2024 04:59)  T(F): 98.2 (11 Apr 2024 11:58), Max: 98.2 (11 Apr 2024 04:59)  HR: 84 (11 Apr 2024 11:58) (78 - 84)  BP: 100/63 (11 Apr 2024 11:58) (100/63 - 144/67)  BP(mean): --  RR: 18 (11 Apr 2024 11:58) (18 - 18)  SpO2: 95% (11 Apr 2024 11:58) (95% - 95%)    Parameters below as of 11 Apr 2024 11:58  Patient On (Oxygen Delivery Method): room air        PHYSICAL EXAMINATION:  GENERAL: NAD,   HEAD:  Atraumatic, Normocephalic  EYES: ,  conjunctiva and sclera clear  NECK: Supple,    CHEST WALL: Nontender  LUNG: Clear to auscultation. No rales, rhonchi, wheezing, or rubs  HEART: Regular rate and rhythm; No murmurs,  ABDOMEN: + LUQ and LLQ abdominal tenderness. + Distention. Soft, Bowel sounds present, No Rovsing's sign   CVA: + LT CVA tenderness   NERVOUS SYSTEM:  Alert & Oriented X3,    EXTREMITIES:  2+ Peripheral Pulses, No clubbing, cyanosis, or edema  CALF: No Calf tenderness   SKIN: warm dry                          13.5   9.78  )-----------( 208      ( 11 Apr 2024 08:15 )             41.7     04-11    132<L>  |  104  |  24<H>  ----------------------------<  161<H>  4.3   |  21<L>  |  2.13<H>    Ca    8.9      11 Apr 2024 08:15  Phos  2.9     04-11  Mg     2.2     04-11    TPro  7.3  /  Alb  3.1<L>  /  TBili  1.6<H>  /  DBili  x   /  AST  20  /  ALT  34  /  AlkPhos  101  04-10    LIVER FUNCTIONS - ( 10 Apr 2024 11:00 )  Alb: 3.1 g/dL / Pro: 7.3 g/dL / ALK PHOS: 101 U/L / ALT: 34 U/L DA / AST: 20 U/L / GGT: x               PT/INR - ( 09 Apr 2024 22:10 )   PT: 12.0 sec;   INR: 1.05 ratio         PTT - ( 09 Apr 2024 22:10 )  PTT:32.3 sec    CAPILLARY BLOOD GLUCOSE      RADIOLOGY & ADDITIONAL TESTS:

## 2024-04-13 NOTE — PROGRESS NOTE ADULT - ASSESSMENT
67 y/o male with PMHx of HLD, DM , Kidney Stones x 2 presents to the ED with flank pain x 5 days . Pain is mostly in the left flank described as sharp , persistent , 8/10 radiating to the left low abdomen similar to his former kidney stones in the past. Afebrile, labs wnl except CKD , CT shows 5 mm sized left distal ureter stone with mild hydroureteronephrosis.    4/11: Cr increased to 2.13 from 1.84. Urine culture negative      Plan  - OR today for left ureteroscopy and stone removal given negative urine culture  - IVF  - NPO  - consent  -medical clearance docuemented
68 year old male S/p left ureteral stent 4/12    - recommend remove ahmadi catheter for TOV  - please check PVR bladder scan, please call [phone] with results  - recommend adding finasteride in addition to flomax for BPH  - patient may be discharged when voiding adequately  - follow up with Dr. Evans in 2 weeks for stent removal   - discussed with Dr. Evans
69 y/o male with PMHx of HLD, DM , Kidney Stones x 2 presents to the ED with flank pain x 5 days . Pain is mostly in the left flank described as sharp , persistent , 8/10 radiating to the left low abdomen similar to his former kidney stones in the past. Afebrile, labs wnl except CKD , CT shows 5 mm sized left distal ureter stone with mild hydroureteronephrosis.    4/11: Cr increased to 2.13 from 1.84. Urine culture negative      Plan  - OR today for left ureteroscopy and stone removal given negative urine culture  - IVF  - NPO  - consent  -medical clearance docuemented   
This is a 68-year-old male with pmhx DM, HLD, kidney stones  presenting with left flank pain for past 5 days. Patient states his pain radiated from left back to left abdominal wall. He had kidney stones in the past, and this pain is similar to prior episodes. Admitted for SHAHBAZ and kidney stone     In ED  vitals: BP: 133/77, On RA, Afebrile  CT A/P: 5 mm sized left distal ureter stone with mild hydroureteronephrosis.  Additional nonobstructing renal stones bilaterally.Prostatic enlargement.       

## 2024-04-13 NOTE — CHART NOTE - NSCHARTNOTEFT_GEN_A_CORE
68 year old male S/p left ureteral stent 4/12    pt voided 100 cc per nursing  PVR 237cc  discussed with Dr Evans    - recommend bid flomax for BPH  - patient may be discharged  - follow up with Dr. Evans in 2 weeks for stent removal   - discussed with Dr. Evans

## 2024-04-13 NOTE — DISCHARGE NOTE NURSING/CASE MANAGEMENT/SOCIAL WORK - PATIENT PORTAL LINK FT
You can access the FollowMyHealth Patient Portal offered by [facility] by registering at the following website: [email or web address]. By joining [facility]’s FollowMyHealth portal, you will also be able to view your health information using other applications (apps) compatible with our system.

## 2024-04-18 ENCOUNTER — APPOINTMENT (OUTPATIENT)
Dept: UROLOGY | Facility: CLINIC | Age: 69
End: 2024-04-18
Payer: MEDICARE

## 2024-04-18 VITALS
RESPIRATION RATE: 16 BRPM | HEIGHT: 63 IN | SYSTOLIC BLOOD PRESSURE: 91 MMHG | WEIGHT: 150 LBS | HEART RATE: 116 BPM | BODY MASS INDEX: 26.58 KG/M2 | TEMPERATURE: 97.1 F | DIASTOLIC BLOOD PRESSURE: 64 MMHG | OXYGEN SATURATION: 97 %

## 2024-04-18 DIAGNOSIS — Z87.442 PERSONAL HISTORY OF URINARY CALCULI: ICD-10-CM

## 2024-04-18 DIAGNOSIS — Z86.39 PERSONAL HISTORY OF OTHER ENDOCRINE, NUTRITIONAL AND METABOLIC DISEASE: ICD-10-CM

## 2024-04-18 PROBLEM — Z00.00 ENCOUNTER FOR PREVENTIVE HEALTH EXAMINATION: Status: ACTIVE | Noted: 2024-04-18

## 2024-04-18 PROCEDURE — 99214 OFFICE O/P EST MOD 30 MIN: CPT

## 2024-04-18 PROCEDURE — G2211 COMPLEX E/M VISIT ADD ON: CPT

## 2024-04-18 RX ORDER — CIPROFLOXACIN HYDROCHLORIDE 500 MG/1
500 TABLET, FILM COATED ORAL
Qty: 20 | Refills: 0 | Status: ACTIVE | COMMUNITY
Start: 2024-04-18 | End: 1900-01-01

## 2024-04-18 RX ORDER — TAMSULOSIN HYDROCHLORIDE 0.4 MG/1
0.4 CAPSULE ORAL
Qty: 180 | Refills: 2 | Status: ACTIVE | COMMUNITY
Start: 2024-04-18 | End: 1900-01-01

## 2024-04-18 NOTE — ASSESSMENT
[FreeTextEntry1] : 69 yo male with bph and nephrolithiasis. He underwent ureteroscopy with stent insertion. He will have his stent removed in 1 week. He has been having fevers the past 3 days reported to 102 with downtrending fever curve. Will send ua and ucx and treat empirically with cipro 500 mg bid.   For his BPH his CT scan demonstrates a prostate of about 80 grams. His tamsulosin was increased to 0.8 mg and started on finasteride. His PVR has improved since day of discharge.   Plan PVR - 16 ml demonstrating patient emptying bladder Urinalysis Urine culture CBC BMP - trend Cr Continue tamsulosin 0.4 mg bid: prescription sent Cipro 500 mg bid: prescription sent FU in 1 week for cysto stent removal   Patient is being seen today for evaluation and management of a chronic and longitudinal ongoing condition and I am of the primary treating physician.

## 2024-04-18 NOTE — HISTORY OF PRESENT ILLNESS
[FreeTextEntry1] : 68-year-old male with BPH and recurrent nephrolithiasis recently admitted for severe pain for an obstructing ureteral stone.  He underwent ureteroscopy with stent insertion.  He also has an enlarged prostate and required Fallon catheter following the procedure.  He had his tamsulosin increased to 0.8 mg daily and was able to void with a PVR of approximately 180 mL while in the hospital.  Over the past 2 to 3 days he has been having fevers as high as 102.  He was also feeling weak and while his temperature curve is going down he is starting to increase his oral intake.  He does state that he has some stent colic but minimal urinary symptoms.  PVR - 16 ml

## 2024-04-19 LAB
ALBUMIN SERPL ELPH-MCNC: 3.2 G/DL
ALP BLD-CCNC: 240 U/L
ALT SERPL-CCNC: 64 U/L
ANION GAP SERPL CALC-SCNC: 16 MMOL/L
AST SERPL-CCNC: 90 U/L
BILIRUB SERPL-MCNC: 1.4 MG/DL
BUN SERPL-MCNC: 54 MG/DL
CALCIUM SERPL-MCNC: 8.5 MG/DL
CHLORIDE SERPL-SCNC: 97 MMOL/L
CO2 SERPL-SCNC: 20 MMOL/L
CREAT SERPL-MCNC: 2.67 MG/DL
EGFR: 25 ML/MIN/1.73M2
GLUCOSE SERPL-MCNC: 235 MG/DL
HCT VFR BLD CALC: 41.5 %
HGB BLD-MCNC: 13.6 G/DL
MCHC RBC-ENTMCNC: 28.3 PG
MCHC RBC-ENTMCNC: 32.8 GM/DL
MCV RBC AUTO: 86.3 FL
PLATELET # BLD AUTO: 128 K/UL
POTASSIUM SERPL-SCNC: 4.4 MMOL/L
PROT SERPL-MCNC: 6.7 G/DL
RBC # BLD: 4.81 M/UL
RBC # FLD: 15.9 %
SODIUM SERPL-SCNC: 134 MMOL/L
WBC # FLD AUTO: 9.19 K/UL

## 2024-04-22 ENCOUNTER — APPOINTMENT (OUTPATIENT)
Dept: UROLOGY | Facility: CLINIC | Age: 69
End: 2024-04-22

## 2024-04-22 VITALS
RESPIRATION RATE: 15 BRPM | DIASTOLIC BLOOD PRESSURE: 75 MMHG | HEIGHT: 63 IN | OXYGEN SATURATION: 97 % | TEMPERATURE: 97.9 F | HEART RATE: 111 BPM | WEIGHT: 150 LBS | SYSTOLIC BLOOD PRESSURE: 117 MMHG | BODY MASS INDEX: 26.58 KG/M2

## 2024-04-22 LAB
APPEARANCE: ABNORMAL
BACTERIA UR CULT: ABNORMAL
BACTERIA: ABNORMAL /HPF
BILIRUBIN URINE: NEGATIVE
BLOOD URINE: ABNORMAL
CAST: 1 /LPF
COLOR: NORMAL
EPITHELIAL CELLS: 0 /HPF
GLUCOSE QUALITATIVE U: >=1000 MG/DL
KETONES URINE: NEGATIVE MG/DL
LEUKOCYTE ESTERASE URINE: ABNORMAL
MICROSCOPIC-UA: NORMAL
NITRITE URINE: NEGATIVE
PH URINE: 5.5
PROTEIN URINE: 100 MG/DL
RED BLOOD CELLS URINE: 2 /HPF
REVIEW: NORMAL
SPECIFIC GRAVITY URINE: 1.03
UROBILINOGEN URINE: 1 MG/DL
WBC CLUMPS: PRESENT
WHITE BLOOD CELLS URINE: 514 /HPF

## 2024-04-22 RX ORDER — AMOXICILLIN AND CLAVULANATE POTASSIUM 875; 125 MG/1; MG/1
875-125 TABLET, COATED ORAL
Qty: 14 | Refills: 0 | Status: ACTIVE | COMMUNITY
Start: 2024-04-22 | End: 1900-01-01

## 2024-04-23 LAB
ANION GAP SERPL CALC-SCNC: 14 MMOL/L
APPEARANCE: ABNORMAL
BACTERIA: ABNORMAL /HPF
BILIRUBIN URINE: NEGATIVE
BLOOD URINE: ABNORMAL
BUN SERPL-MCNC: 36 MG/DL
CALCIUM SERPL-MCNC: 8.6 MG/DL
CAST: NORMAL /LPF
CHLORIDE SERPL-SCNC: 101 MMOL/L
CO2 SERPL-SCNC: 20 MMOL/L
COLOR: YELLOW
CREAT SERPL-MCNC: 2.09 MG/DL
EGFR: 34 ML/MIN/1.73M2
EPITHELIAL CELLS: 1 /HPF
GLUCOSE QUALITATIVE U: >=1000 MG/DL
GLUCOSE SERPL-MCNC: 200 MG/DL
HYALINE CASTS: NORMAL
KETONES URINE: NEGATIVE MG/DL
LEUKOCYTE ESTERASE URINE: ABNORMAL
MICROSCOPIC-UA: NORMAL
NITRITE URINE: NEGATIVE
PH URINE: 5.5
POTASSIUM SERPL-SCNC: 5.3 MMOL/L
PROTEIN URINE: 100 MG/DL
RED BLOOD CELLS URINE: 144 /HPF
REVIEW: NORMAL
SODIUM SERPL-SCNC: 135 MMOL/L
SPECIFIC GRAVITY URINE: 1.02
UROBILINOGEN URINE: 0.2 MG/DL
WBC CLUMPS: PRESENT
WHITE BLOOD CELLS URINE: 790 /HPF

## 2024-04-25 ENCOUNTER — APPOINTMENT (OUTPATIENT)
Dept: UROLOGY | Facility: CLINIC | Age: 69
End: 2024-04-25
Payer: MEDICARE

## 2024-04-25 ENCOUNTER — APPOINTMENT (OUTPATIENT)
Dept: CT IMAGING | Facility: CLINIC | Age: 69
End: 2024-04-25
Payer: MEDICARE

## 2024-04-25 VITALS
TEMPERATURE: 97.1 F | SYSTOLIC BLOOD PRESSURE: 124 MMHG | HEIGHT: 63 IN | WEIGHT: 137 LBS | HEART RATE: 115 BPM | OXYGEN SATURATION: 98 % | BODY MASS INDEX: 24.27 KG/M2 | DIASTOLIC BLOOD PRESSURE: 87 MMHG

## 2024-04-25 DIAGNOSIS — N39.0 URINARY TRACT INFECTION, SITE NOT SPECIFIED: ICD-10-CM

## 2024-04-25 LAB
BACTERIA UR CULT: ABNORMAL
CELL MATERIAL STONE EST-MCNT: SIGNIFICANT CHANGE UP
LABORATORY COMMENT REPORT: SIGNIFICANT CHANGE UP
NIDUS STONE QN: SIGNIFICANT CHANGE UP

## 2024-04-25 PROCEDURE — 99214 OFFICE O/P EST MOD 30 MIN: CPT

## 2024-04-25 PROCEDURE — G2211 COMPLEX E/M VISIT ADD ON: CPT

## 2024-04-25 PROCEDURE — 74176 CT ABD & PELVIS W/O CONTRAST: CPT

## 2024-04-25 NOTE — ASSESSMENT
[FreeTextEntry1] : 69 yo male with bph and nephrolithiasis.  Postop he was having fevers to 102 and urine culture grew ESBL E. coli.  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  He would prefer not to have a Fallon catheter placed at this time.  His PVR was 0 mL.  He will continue on his Augmentin twice daily and will repeat urine culture today to ensure negative prior to removing stent.    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

## 2024-04-25 NOTE — HISTORY OF PRESENT ILLNESS
[FreeTextEntry1] : 68-year-old male with BPH and recurrent nephrolithiasis recently admitted for severe pain for an obstructing ureteral stone.  He underwent ureteroscopy with stent insertion.  He also has an enlarged prostate and required Fallon catheter following the procedure.  He had his tamsulosin increased to 0.8 mg daily and was able to void with a PVR of approximately 180 mL while in the hospital.  .  He was having fevers up to 102 and urine culture grew ESBL E. coli.  He was started on Augmentin twice daily April 23, 2024 and states he has not had any fever since then.  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  His PVR today was 0 mL.

## 2024-04-26 ENCOUNTER — INPATIENT (INPATIENT)
Facility: HOSPITAL | Age: 69
LOS: 6 days | Discharge: HOME | DRG: 872 | End: 2024-05-03
Attending: STUDENT IN AN ORGANIZED HEALTH CARE EDUCATION/TRAINING PROGRAM | Admitting: STUDENT IN AN ORGANIZED HEALTH CARE EDUCATION/TRAINING PROGRAM
Payer: MEDICARE

## 2024-04-26 VITALS
HEIGHT: 63 IN | RESPIRATION RATE: 18 BRPM | WEIGHT: 139.99 LBS | SYSTOLIC BLOOD PRESSURE: 100 MMHG | OXYGEN SATURATION: 94 % | HEART RATE: 150 BPM | DIASTOLIC BLOOD PRESSURE: 66 MMHG | TEMPERATURE: 102 F

## 2024-04-26 LAB
ALBUMIN SERPL ELPH-MCNC: 2.8 G/DL — LOW (ref 3.5–5)
ALP SERPL-CCNC: 295 U/L — HIGH (ref 40–120)
ALT FLD-CCNC: 83 U/L DA — HIGH (ref 10–60)
ANION GAP SERPL CALC-SCNC: 9 MMOL/L — SIGNIFICANT CHANGE UP (ref 5–17)
APPEARANCE: ABNORMAL
APTT BLD: 27.5 SEC — SIGNIFICANT CHANGE UP (ref 24.5–35.6)
AST SERPL-CCNC: 68 U/L — HIGH (ref 10–40)
BACTERIA: NEGATIVE /HPF
BASOPHILS # BLD AUTO: 0.09 K/UL — SIGNIFICANT CHANGE UP (ref 0–0.2)
BASOPHILS NFR BLD AUTO: 0.4 % — SIGNIFICANT CHANGE UP (ref 0–2)
BILIRUB SERPL-MCNC: 1.1 MG/DL — SIGNIFICANT CHANGE UP (ref 0.2–1.2)
BILIRUBIN URINE: NEGATIVE
BLOOD URINE: ABNORMAL
BUN SERPL-MCNC: 27 MG/DL — HIGH (ref 7–18)
CALCIUM SERPL-MCNC: 8.7 MG/DL — SIGNIFICANT CHANGE UP (ref 8.4–10.5)
CAST: 8 /LPF
CHLORIDE SERPL-SCNC: 104 MMOL/L — SIGNIFICANT CHANGE UP (ref 96–108)
CO2 SERPL-SCNC: 21 MMOL/L — LOW (ref 22–31)
COLOR: YELLOW
CREAT SERPL-MCNC: 2.15 MG/DL — HIGH (ref 0.5–1.3)
EGFR: 33 ML/MIN/1.73M2 — LOW
EOSINOPHIL # BLD AUTO: 0.11 K/UL — SIGNIFICANT CHANGE UP (ref 0–0.5)
EOSINOPHIL NFR BLD AUTO: 0.5 % — SIGNIFICANT CHANGE UP (ref 0–6)
EPITHELIAL CELLS: 0 /HPF
GLUCOSE QUALITATIVE U: 100 MG/DL
GLUCOSE SERPL-MCNC: 138 MG/DL — HIGH (ref 70–99)
HCT VFR BLD CALC: 42 % — SIGNIFICANT CHANGE UP (ref 39–50)
HGB BLD-MCNC: 13.6 G/DL — SIGNIFICANT CHANGE UP (ref 13–17)
IMM GRANULOCYTES NFR BLD AUTO: 1.1 % — HIGH (ref 0–0.9)
INR BLD: 1.18 RATIO — SIGNIFICANT CHANGE UP (ref 0.85–1.18)
KETONES URINE: NEGATIVE MG/DL
LACTATE SERPL-SCNC: 3.2 MMOL/L — HIGH (ref 0.7–2)
LEUKOCYTE ESTERASE URINE: ABNORMAL
LG PLATELETS BLD QL AUTO: SLIGHT — SIGNIFICANT CHANGE UP
LYMPHOCYTES # BLD AUTO: 0.34 K/UL — LOW (ref 1–3.3)
LYMPHOCYTES # BLD AUTO: 1.4 % — LOW (ref 13–44)
MANUAL SMEAR VERIFICATION: SIGNIFICANT CHANGE UP
MCHC RBC-ENTMCNC: 28.2 PG — SIGNIFICANT CHANGE UP (ref 27–34)
MCHC RBC-ENTMCNC: 32.4 GM/DL — SIGNIFICANT CHANGE UP (ref 32–36)
MCV RBC AUTO: 87.1 FL — SIGNIFICANT CHANGE UP (ref 80–100)
MICROSCOPIC-UA: NORMAL
MONOCYTES # BLD AUTO: 0.13 K/UL — SIGNIFICANT CHANGE UP (ref 0–0.9)
MONOCYTES NFR BLD AUTO: 0.5 % — LOW (ref 2–14)
NEUTROPHILS # BLD AUTO: 22.91 K/UL — HIGH (ref 1.8–7.4)
NEUTROPHILS NFR BLD AUTO: 96.1 % — HIGH (ref 43–77)
NITRITE URINE: NEGATIVE
NRBC # BLD: 0 /100 WBCS — SIGNIFICANT CHANGE UP (ref 0–0)
PH URINE: 6
PLAT MORPH BLD: NORMAL — SIGNIFICANT CHANGE UP
PLATELET # BLD AUTO: 598 K/UL — HIGH (ref 150–400)
PLATELET COUNT - ESTIMATE: ABNORMAL
POTASSIUM SERPL-MCNC: 4.3 MMOL/L — SIGNIFICANT CHANGE UP (ref 3.5–5.3)
POTASSIUM SERPL-SCNC: 4.3 MMOL/L — SIGNIFICANT CHANGE UP (ref 3.5–5.3)
PROT SERPL-MCNC: 8.4 G/DL — HIGH (ref 6–8.3)
PROTEIN URINE: 100 MG/DL
PROTHROM AB SERPL-ACNC: 13.4 SEC — HIGH (ref 9.5–13)
RBC # BLD: 4.82 M/UL — SIGNIFICANT CHANGE UP (ref 4.2–5.8)
RBC # FLD: 14.8 % — HIGH (ref 10.3–14.5)
RBC BLD AUTO: NORMAL — SIGNIFICANT CHANGE UP
RED BLOOD CELLS URINE: 84 /HPF
REVIEW: NORMAL
SODIUM SERPL-SCNC: 134 MMOL/L — LOW (ref 135–145)
SPECIFIC GRAVITY URINE: 1.01
TOXIC GRANULES BLD QL SMEAR: PRESENT — SIGNIFICANT CHANGE UP
UROBILINOGEN URINE: 0.2 MG/DL
WBC # BLD: 23.85 K/UL — HIGH (ref 3.8–10.5)
WBC # FLD AUTO: 23.85 K/UL — HIGH (ref 3.8–10.5)
WHITE BLOOD CELLS URINE: 233 /HPF

## 2024-04-26 PROCEDURE — 71045 X-RAY EXAM CHEST 1 VIEW: CPT | Mod: 26

## 2024-04-26 PROCEDURE — 99285 EMERGENCY DEPT VISIT HI MDM: CPT

## 2024-04-26 RX ORDER — SODIUM CHLORIDE 9 MG/ML
1000 INJECTION INTRAMUSCULAR; INTRAVENOUS; SUBCUTANEOUS ONCE
Refills: 0 | Status: COMPLETED | OUTPATIENT
Start: 2024-04-26 | End: 2024-04-26

## 2024-04-26 RX ORDER — ACETAMINOPHEN 500 MG
650 TABLET ORAL ONCE
Refills: 0 | Status: COMPLETED | OUTPATIENT
Start: 2024-04-26 | End: 2024-04-26

## 2024-04-26 RX ADMIN — Medication 650 MILLIGRAM(S): at 23:19

## 2024-04-26 RX ADMIN — SODIUM CHLORIDE 1000 MILLILITER(S): 9 INJECTION INTRAMUSCULAR; INTRAVENOUS; SUBCUTANEOUS at 23:19

## 2024-04-26 NOTE — ED ADULT TRIAGE NOTE - CHIEF COMPLAINT QUOTE
BIBA EMs reports that pt  Fever x2 weeks ,kidney stones  surgery done 2 weeks ago  on antibiotics  , he has fever chills

## 2024-04-27 DIAGNOSIS — N12 TUBULO-INTERSTITIAL NEPHRITIS, NOT SPECIFIED AS ACUTE OR CHRONIC: ICD-10-CM

## 2024-04-27 DIAGNOSIS — Z29.9 ENCOUNTER FOR PROPHYLACTIC MEASURES, UNSPECIFIED: ICD-10-CM

## 2024-04-27 DIAGNOSIS — A41.9 SEPSIS, UNSPECIFIED ORGANISM: ICD-10-CM

## 2024-04-27 DIAGNOSIS — N17.9 ACUTE KIDNEY FAILURE, UNSPECIFIED: ICD-10-CM

## 2024-04-27 DIAGNOSIS — E11.9 TYPE 2 DIABETES MELLITUS WITHOUT COMPLICATIONS: ICD-10-CM

## 2024-04-27 LAB
-  CTX-M RESISTANCE MARKER: SIGNIFICANT CHANGE UP
-  ESBL: SIGNIFICANT CHANGE UP
ALBUMIN SERPL ELPH-MCNC: 2 G/DL — LOW (ref 3.5–5)
ALBUMIN SERPL ELPH-MCNC: 2.1 G/DL — LOW (ref 3.5–5)
ALP SERPL-CCNC: 178 U/L — HIGH (ref 40–120)
ALP SERPL-CCNC: 210 U/L — HIGH (ref 40–120)
ALT FLD-CCNC: 57 U/L DA — SIGNIFICANT CHANGE UP (ref 10–60)
ALT FLD-CCNC: 63 U/L DA — HIGH (ref 10–60)
ANION GAP SERPL CALC-SCNC: 7 MMOL/L — SIGNIFICANT CHANGE UP (ref 5–17)
ANION GAP SERPL CALC-SCNC: 9 MMOL/L — SIGNIFICANT CHANGE UP (ref 5–17)
APPEARANCE UR: ABNORMAL
AST SERPL-CCNC: 37 U/L — SIGNIFICANT CHANGE UP (ref 10–40)
AST SERPL-CCNC: 49 U/L — HIGH (ref 10–40)
BACTERIA # UR AUTO: ABNORMAL /HPF
BASE EXCESS BLDV CALC-SCNC: -3.7 MMOL/L — SIGNIFICANT CHANGE UP
BASOPHILS # BLD AUTO: 0.13 K/UL — SIGNIFICANT CHANGE UP (ref 0–0.2)
BASOPHILS NFR BLD AUTO: 0.3 % — SIGNIFICANT CHANGE UP (ref 0–2)
BILIRUB SERPL-MCNC: 0.7 MG/DL — SIGNIFICANT CHANGE UP (ref 0.2–1.2)
BILIRUB SERPL-MCNC: 0.9 MG/DL — SIGNIFICANT CHANGE UP (ref 0.2–1.2)
BILIRUB UR-MCNC: NEGATIVE — SIGNIFICANT CHANGE UP
BUN SERPL-MCNC: 26 MG/DL — HIGH (ref 7–18)
BUN SERPL-MCNC: 26 MG/DL — HIGH (ref 7–18)
CA-I SERPL-SCNC: SIGNIFICANT CHANGE UP MMOL/L (ref 1.15–1.33)
CALCIUM SERPL-MCNC: 8.1 MG/DL — LOW (ref 8.4–10.5)
CALCIUM SERPL-MCNC: 8.1 MG/DL — LOW (ref 8.4–10.5)
CHLORIDE SERPL-SCNC: 107 MMOL/L — SIGNIFICANT CHANGE UP (ref 96–108)
CHLORIDE SERPL-SCNC: 107 MMOL/L — SIGNIFICANT CHANGE UP (ref 96–108)
CO2 SERPL-SCNC: 19 MMOL/L — LOW (ref 22–31)
CO2 SERPL-SCNC: 22 MMOL/L — SIGNIFICANT CHANGE UP (ref 22–31)
COLOR SPEC: YELLOW — SIGNIFICANT CHANGE UP
CREAT SERPL-MCNC: 1.57 MG/DL — HIGH (ref 0.5–1.3)
CREAT SERPL-MCNC: 2.09 MG/DL — HIGH (ref 0.5–1.3)
DIFF PNL FLD: ABNORMAL
E COLI DNA BLD POS QL NAA+NON-PROBE: SIGNIFICANT CHANGE UP
EGFR: 34 ML/MIN/1.73M2 — LOW
EGFR: 47 ML/MIN/1.73M2 — LOW
EOSINOPHIL # BLD AUTO: 0.1 K/UL — SIGNIFICANT CHANGE UP (ref 0–0.5)
EOSINOPHIL NFR BLD AUTO: 0.2 % — SIGNIFICANT CHANGE UP (ref 0–6)
GAS PNL BLDV: 128 MMOL/L — LOW (ref 136–145)
GAS PNL BLDV: SIGNIFICANT CHANGE UP
GLUCOSE BLDC GLUCOMTR-MCNC: 123 MG/DL — HIGH (ref 70–99)
GLUCOSE BLDC GLUCOMTR-MCNC: 138 MG/DL — HIGH (ref 70–99)
GLUCOSE BLDC GLUCOMTR-MCNC: 147 MG/DL — HIGH (ref 70–99)
GLUCOSE BLDC GLUCOMTR-MCNC: 171 MG/DL — HIGH (ref 70–99)
GLUCOSE SERPL-MCNC: 135 MG/DL — HIGH (ref 70–99)
GLUCOSE SERPL-MCNC: 182 MG/DL — HIGH (ref 70–99)
GLUCOSE UR QL: 100 MG/DL
GRAM STN FLD: ABNORMAL
HCO3 BLDV-SCNC: 20 MMOL/L — LOW (ref 22–29)
HCT VFR BLD CALC: 33.6 % — LOW (ref 39–50)
HCT VFR BLD CALC: 35.3 % — LOW (ref 39–50)
HGB BLD-MCNC: 11.1 G/DL — LOW (ref 13–17)
HGB BLD-MCNC: 11.6 G/DL — LOW (ref 13–17)
IMM GRANULOCYTES NFR BLD AUTO: 1.3 % — HIGH (ref 0–0.9)
KETONES UR-MCNC: ABNORMAL MG/DL
LACTATE BLDV-MCNC: 4.6 MMOL/L — CRITICAL HIGH (ref 0.5–2)
LACTATE SERPL-SCNC: 2.1 MMOL/L — HIGH (ref 0.7–2)
LACTATE SERPL-SCNC: 2.5 MMOL/L — HIGH (ref 0.7–2)
LACTATE SERPL-SCNC: 3.5 MMOL/L — HIGH (ref 0.7–2)
LEUKOCYTE ESTERASE UR-ACNC: ABNORMAL
LYMPHOCYTES # BLD AUTO: 3.09 K/UL — SIGNIFICANT CHANGE UP (ref 1–3.3)
LYMPHOCYTES # BLD AUTO: 7.6 % — LOW (ref 13–44)
MAGNESIUM SERPL-MCNC: 1.8 MG/DL — SIGNIFICANT CHANGE UP (ref 1.6–2.6)
MAGNESIUM SERPL-MCNC: 2 MG/DL — SIGNIFICANT CHANGE UP (ref 1.6–2.6)
MCHC RBC-ENTMCNC: 28.5 PG — SIGNIFICANT CHANGE UP (ref 27–34)
MCHC RBC-ENTMCNC: 28.7 PG — SIGNIFICANT CHANGE UP (ref 27–34)
MCHC RBC-ENTMCNC: 32.9 GM/DL — SIGNIFICANT CHANGE UP (ref 32–36)
MCHC RBC-ENTMCNC: 33 GM/DL — SIGNIFICANT CHANGE UP (ref 32–36)
MCV RBC AUTO: 86.2 FL — SIGNIFICANT CHANGE UP (ref 80–100)
MCV RBC AUTO: 87.4 FL — SIGNIFICANT CHANGE UP (ref 80–100)
METHOD TYPE: SIGNIFICANT CHANGE UP
MONOCYTES # BLD AUTO: 1.45 K/UL — HIGH (ref 0–0.9)
MONOCYTES NFR BLD AUTO: 3.6 % — SIGNIFICANT CHANGE UP (ref 2–14)
NEUTROPHILS # BLD AUTO: 35.28 K/UL — HIGH (ref 1.8–7.4)
NEUTROPHILS NFR BLD AUTO: 87 % — HIGH (ref 43–77)
NITRITE UR-MCNC: NEGATIVE — SIGNIFICANT CHANGE UP
NRBC # BLD: 0 /100 WBCS — SIGNIFICANT CHANGE UP (ref 0–0)
NRBC # BLD: 0 /100 WBCS — SIGNIFICANT CHANGE UP (ref 0–0)
PCO2 BLDV: 32 MMHG — LOW (ref 42–55)
PH BLDV: 7.4 — SIGNIFICANT CHANGE UP (ref 7.32–7.43)
PH UR: 5.5 — SIGNIFICANT CHANGE UP (ref 5–8)
PHOSPHATE SERPL-MCNC: 2.2 MG/DL — LOW (ref 2.5–4.5)
PHOSPHATE SERPL-MCNC: 2.3 MG/DL — LOW (ref 2.5–4.5)
PLATELET # BLD AUTO: 478 K/UL — HIGH (ref 150–400)
PLATELET # BLD AUTO: 523 K/UL — HIGH (ref 150–400)
PO2 BLDV: 76 MMHG — SIGNIFICANT CHANGE UP
POTASSIUM BLDV-SCNC: 4.7 MMOL/L — SIGNIFICANT CHANGE UP (ref 3.5–5.1)
POTASSIUM SERPL-MCNC: 4.2 MMOL/L — SIGNIFICANT CHANGE UP (ref 3.5–5.3)
POTASSIUM SERPL-MCNC: 4.9 MMOL/L — SIGNIFICANT CHANGE UP (ref 3.5–5.3)
POTASSIUM SERPL-SCNC: 4.2 MMOL/L — SIGNIFICANT CHANGE UP (ref 3.5–5.3)
POTASSIUM SERPL-SCNC: 4.9 MMOL/L — SIGNIFICANT CHANGE UP (ref 3.5–5.3)
PROT SERPL-MCNC: 6.5 G/DL — SIGNIFICANT CHANGE UP (ref 6–8.3)
PROT SERPL-MCNC: 7 G/DL — SIGNIFICANT CHANGE UP (ref 6–8.3)
PROT UR-MCNC: 100 MG/DL
RBC # BLD: 3.9 M/UL — LOW (ref 4.2–5.8)
RBC # BLD: 4.04 M/UL — LOW (ref 4.2–5.8)
RBC # FLD: 15 % — HIGH (ref 10.3–14.5)
RBC # FLD: 15.1 % — HIGH (ref 10.3–14.5)
RBC CASTS # UR COMP ASSIST: 8 /HPF — HIGH (ref 0–4)
SAO2 % BLDV: 97.1 % — SIGNIFICANT CHANGE UP
SODIUM SERPL-SCNC: 135 MMOL/L — SIGNIFICANT CHANGE UP (ref 135–145)
SODIUM SERPL-SCNC: 136 MMOL/L — SIGNIFICANT CHANGE UP (ref 135–145)
SP GR SPEC: 1.02 — SIGNIFICANT CHANGE UP (ref 1–1.03)
SPECIMEN SOURCE: SIGNIFICANT CHANGE UP
SPECIMEN SOURCE: SIGNIFICANT CHANGE UP
UROBILINOGEN FLD QL: 0.2 MG/DL — SIGNIFICANT CHANGE UP (ref 0.2–1)
WBC # BLD: 40.56 K/UL — CRITICAL HIGH (ref 3.8–10.5)
WBC # BLD: 51.09 K/UL — CRITICAL HIGH (ref 3.8–10.5)
WBC # FLD AUTO: 40.56 K/UL — CRITICAL HIGH (ref 3.8–10.5)
WBC # FLD AUTO: 51.09 K/UL — CRITICAL HIGH (ref 3.8–10.5)
WBC UR QL: 10 /HPF — HIGH (ref 0–5)

## 2024-04-27 PROCEDURE — 99223 1ST HOSP IP/OBS HIGH 75: CPT | Mod: GC

## 2024-04-27 PROCEDURE — 74176 CT ABD & PELVIS W/O CONTRAST: CPT | Mod: 26,MC

## 2024-04-27 PROCEDURE — 99223 1ST HOSP IP/OBS HIGH 75: CPT

## 2024-04-27 RX ORDER — ERTAPENEM SODIUM 1 G/1
500 INJECTION, POWDER, LYOPHILIZED, FOR SOLUTION INTRAMUSCULAR; INTRAVENOUS EVERY 24 HOURS
Refills: 0 | Status: DISCONTINUED | OUTPATIENT
Start: 2024-04-28 | End: 2024-04-29

## 2024-04-27 RX ORDER — ACETAMINOPHEN 500 MG
650 TABLET ORAL EVERY 6 HOURS
Refills: 0 | Status: DISCONTINUED | OUTPATIENT
Start: 2024-04-27 | End: 2024-05-03

## 2024-04-27 RX ORDER — SODIUM CHLORIDE 9 MG/ML
1000 INJECTION INTRAMUSCULAR; INTRAVENOUS; SUBCUTANEOUS ONCE
Refills: 0 | Status: COMPLETED | OUTPATIENT
Start: 2024-04-27 | End: 2024-04-27

## 2024-04-27 RX ORDER — ERTAPENEM SODIUM 1 G/1
INJECTION, POWDER, LYOPHILIZED, FOR SOLUTION INTRAMUSCULAR; INTRAVENOUS
Refills: 0 | Status: DISCONTINUED | OUTPATIENT
Start: 2024-04-27 | End: 2024-04-29

## 2024-04-27 RX ORDER — ONDANSETRON 8 MG/1
4 TABLET, FILM COATED ORAL EVERY 8 HOURS
Refills: 0 | Status: DISCONTINUED | OUTPATIENT
Start: 2024-04-27 | End: 2024-05-03

## 2024-04-27 RX ORDER — INSULIN LISPRO 100/ML
VIAL (ML) SUBCUTANEOUS
Refills: 0 | Status: DISCONTINUED | OUTPATIENT
Start: 2024-04-27 | End: 2024-05-03

## 2024-04-27 RX ORDER — ERTAPENEM SODIUM 1 G/1
500 INJECTION, POWDER, LYOPHILIZED, FOR SOLUTION INTRAMUSCULAR; INTRAVENOUS ONCE
Refills: 0 | Status: COMPLETED | OUTPATIENT
Start: 2024-04-27 | End: 2024-04-27

## 2024-04-27 RX ORDER — SODIUM CHLORIDE 9 MG/ML
1000 INJECTION, SOLUTION INTRAVENOUS
Refills: 0 | Status: COMPLETED | OUTPATIENT
Start: 2024-04-27 | End: 2024-04-27

## 2024-04-27 RX ORDER — HEPARIN SODIUM 5000 [USP'U]/ML
5000 INJECTION INTRAVENOUS; SUBCUTANEOUS EVERY 8 HOURS
Refills: 0 | Status: DISCONTINUED | OUTPATIENT
Start: 2024-04-27 | End: 2024-05-03

## 2024-04-27 RX ORDER — SODIUM CHLORIDE 9 MG/ML
1000 INJECTION, SOLUTION INTRAVENOUS ONCE
Refills: 0 | Status: COMPLETED | OUTPATIENT
Start: 2024-04-27 | End: 2024-04-27

## 2024-04-27 RX ORDER — LANOLIN ALCOHOL/MO/W.PET/CERES
3 CREAM (GRAM) TOPICAL AT BEDTIME
Refills: 0 | Status: DISCONTINUED | OUTPATIENT
Start: 2024-04-27 | End: 2024-05-03

## 2024-04-27 RX ORDER — MEROPENEM 1 G/30ML
1000 INJECTION INTRAVENOUS ONCE
Refills: 0 | Status: DISCONTINUED | OUTPATIENT
Start: 2024-04-27 | End: 2024-04-27

## 2024-04-27 RX ORDER — CEFTRIAXONE 500 MG/1
2000 INJECTION, POWDER, FOR SOLUTION INTRAMUSCULAR; INTRAVENOUS ONCE
Refills: 0 | Status: DISCONTINUED | OUTPATIENT
Start: 2024-04-27 | End: 2024-04-27

## 2024-04-27 RX ORDER — INSULIN LISPRO 100/ML
VIAL (ML) SUBCUTANEOUS AT BEDTIME
Refills: 0 | Status: DISCONTINUED | OUTPATIENT
Start: 2024-04-27 | End: 2024-05-03

## 2024-04-27 RX ORDER — ATORVASTATIN CALCIUM 80 MG/1
1 TABLET, FILM COATED ORAL
Refills: 0 | DISCHARGE

## 2024-04-27 RX ORDER — MEROPENEM 1 G/30ML
1000 INJECTION INTRAVENOUS EVERY 12 HOURS
Refills: 0 | Status: DISCONTINUED | OUTPATIENT
Start: 2024-04-27 | End: 2024-04-27

## 2024-04-27 RX ORDER — SODIUM CHLORIDE 9 MG/ML
1000 INJECTION, SOLUTION INTRAVENOUS
Refills: 0 | Status: DISCONTINUED | OUTPATIENT
Start: 2024-04-27 | End: 2024-04-28

## 2024-04-27 RX ORDER — TAMSULOSIN HYDROCHLORIDE 0.4 MG/1
0.8 CAPSULE ORAL AT BEDTIME
Refills: 0 | Status: DISCONTINUED | OUTPATIENT
Start: 2024-04-27 | End: 2024-05-03

## 2024-04-27 RX ORDER — ERTAPENEM SODIUM 1 G/1
INJECTION, POWDER, LYOPHILIZED, FOR SOLUTION INTRAMUSCULAR; INTRAVENOUS
Refills: 0 | Status: DISCONTINUED | OUTPATIENT
Start: 2024-04-27 | End: 2024-04-27

## 2024-04-27 RX ORDER — TAMSULOSIN HYDROCHLORIDE 0.4 MG/1
0.4 CAPSULE ORAL AT BEDTIME
Refills: 0 | Status: DISCONTINUED | OUTPATIENT
Start: 2024-04-27 | End: 2024-04-27

## 2024-04-27 RX ADMIN — HEPARIN SODIUM 5000 UNIT(S): 5000 INJECTION INTRAVENOUS; SUBCUTANEOUS at 14:02

## 2024-04-27 RX ADMIN — HEPARIN SODIUM 5000 UNIT(S): 5000 INJECTION INTRAVENOUS; SUBCUTANEOUS at 21:57

## 2024-04-27 RX ADMIN — TAMSULOSIN HYDROCHLORIDE 0.8 MILLIGRAM(S): 0.4 CAPSULE ORAL at 21:57

## 2024-04-27 RX ADMIN — Medication 650 MILLIGRAM(S): at 05:57

## 2024-04-27 RX ADMIN — SODIUM CHLORIDE 1000 MILLILITER(S): 9 INJECTION, SOLUTION INTRAVENOUS at 06:08

## 2024-04-27 RX ADMIN — SODIUM CHLORIDE 999 MILLILITER(S): 9 INJECTION, SOLUTION INTRAVENOUS at 11:05

## 2024-04-27 RX ADMIN — ERTAPENEM SODIUM 100 MILLIGRAM(S): 1 INJECTION, POWDER, LYOPHILIZED, FOR SOLUTION INTRAMUSCULAR; INTRAVENOUS at 07:37

## 2024-04-27 RX ADMIN — SODIUM CHLORIDE 1000 MILLILITER(S): 9 INJECTION INTRAMUSCULAR; INTRAVENOUS; SUBCUTANEOUS at 14:04

## 2024-04-27 RX ADMIN — HEPARIN SODIUM 5000 UNIT(S): 5000 INJECTION INTRAVENOUS; SUBCUTANEOUS at 06:09

## 2024-04-27 RX ADMIN — SODIUM CHLORIDE 75 MILLILITER(S): 9 INJECTION, SOLUTION INTRAVENOUS at 07:36

## 2024-04-27 NOTE — H&P ADULT - ASSESSMENT
68M PMH DM presenting to the ED due to fevers, chills, left flank pain admitted for septic shock 2/2 pyelonephritis 68M PMH DM presenting to the ED due to fevers, chills, left flank pain admitted for severe sepsis 2/2 pyelonephritis

## 2024-04-27 NOTE — H&P ADULT - PROBLEM SELECTOR PLAN 2
presenting with fevers, chills, left flank pain  CTAP: Increased left-sided perinephric stranding compared to the previous exam suggestive of pyelonephritis. There are several foci of air seen at the left upper pole kidney which appear parenchymal, raising possibility of   emphysematous pyelonephritis.   recently had L ureterscopy with stent placement on 4/12  outpatient UCx (+) ESBL, sensitive to ertapenem  received 1L IVF in ED  VS on admission: Temp 98.2, HR 97, BP 95/64, RR 18, 98% on RA  cipro changed to augmentin outpt  received darek x 1 dose in ED  will continue with ertapenem 1g qd x 10 days   WBC 23K  lactate 3.2 > 2.5 > continue to trend  maintenance fluids  Urology following: no intervention at this time presenting with fevers, chills, left flank pain  CTAP: Increased left-sided perinephric stranding compared to the previous exam suggestive of pyelonephritis. There are several foci of air seen at the left upper pole kidney which appear parenchymal, raising possibility of   emphysematous pyelonephritis.   recently had L ureterscopy with stent placement on 4/12  outpatient UCx (+) ESBL, sensitive to ertapenem  received 1L IVF in ED  VS on admission: Temp 98.2, HR 97, BP 95/64, RR 18, 98% on RA  cipro changed to augmentin outpt  received darek x 1 dose in ED  will continue with ertapenem 500g qd x 10 days   WBC 23K  lactate 3.2 > 2.5 > continue to trend  maintenance fluids  Urology following: no intervention at this time

## 2024-04-27 NOTE — PATIENT PROFILE ADULT - FUNCTIONAL ASSESSMENT - BASIC MOBILITY 5.
Airway       Patient location during procedure: OR  Staff -        CRNA: Tamiko Emerson APRN CRNA       Performed By: CRNA  Consent for Airway        Urgency: elective  Indications and Patient Condition       Indications for airway management: chuck-procedural       Induction type:intravenous       Mask difficulty assessment: 1 - vent by mask    Final Airway Details       Final airway type: endotracheal airway       Successful airway: ETT - single and Laser  Endotracheal Airway Details        ETT size (mm): 6.0       Cuffed: yes       Successful intubation technique: video laryngoscopy       VL Blade Size: MAC D Blade       Grade View of Cords: 2       Adjucts: stylet       Position: Left       Measured from: gums/teeth       Bite block used: None    Post intubation assessment        Placement verified by: capnometry        Number of attempts at approach: 2 (first attempt with DL mac 4- could not visualize cords (radiation to neck), second look with dblade cmac was easy g2v)       Secured with: pink tape       Ease of procedure: easy       Dentition: Intact and Unchanged    Medication(s) Administered   Medication Administration Time: 5/24/2021 7:58 AM             3 = A little assistance

## 2024-04-27 NOTE — PATIENT PROFILE ADULT - FALL HARM RISK - RISK INTERVENTIONS
Assistance OOB with selected safe patient handling equipment/Assistance with ambulation/Communicate Fall Risk and Risk Factors to all staff, patient, and family/Monitor for mental status changes/Monitor gait and stability/Move patient closer to nurses' station/Reinforce activity limits and safety measures with patient and family/Reorient to person, place and time as needed/Review medications for side effects contributing to fall risk/Sit up slowly, dangle for a short time, stand at bedside before walking/Use of alarms - bed, chair and/or voice tab/Visual Cue: Yellow wristband/Bed in lowest position, wheels locked, appropriate side rails in place/Call bell, personal items and telephone in reach/Instruct patient to call for assistance before getting out of bed or chair/Non-slip footwear when patient is out of bed/[place] to call system/Physically safe environment - no spills, clutter or unnecessary equipment/Purposeful Proactive Rounding/Room/bathroom lighting operational, light cord in reach

## 2024-04-27 NOTE — H&P ADULT - HISTORY OF PRESENT ILLNESS
68M PMH DM presenting to the ED due to fevers and chills. Pt states that he recently had a L ureteroscopy with stent placement on 4/12 with Dr. Evans.  After he was discharged has having intermittent fevers. He had a follow up appointment with Dr. Evans on 4/18 and was empirically started on cipro. His outpatient urine cultures were positive for ESBL and his anitbiotics were switched to augmentin on 4/23. 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 Last night, he had another fever of 102F and continued having left flank pain, nonradiating, sharp, and intermittent. His daughter also reports that he has been having decreased appetite. While in the ED he was experiencing urinary frequency and had one episode of diarrhea. He denies CP, SOB, vomiting, abdominal pain, dysuria, numbness/tingling/weakness in extremities.

## 2024-04-27 NOTE — ED PROVIDER NOTE - CLINICAL SUMMARY MEDICAL DECISION MAKING FREE TEXT BOX
68-year-old male with recent kidney stone removal with stent placement here with fever chills and worsening flank pain.  Concern for pyelonephritis, resistant infection among other causes.  Code sepsis was already called.  Plan to perform sepsis workup, CT imaging of abdomen pelvis and urology consultation.  Anticipate admission.

## 2024-04-27 NOTE — CONSULT NOTE ADULT - SUBJECTIVE AND OBJECTIVE BOX
HISTORY OF PRESENT ILLNESS:  Mr. Cervantes is a 68 year old man with PMHx DM, prostatic hypertrophy, renal stones s/p L ureteroscopy with stent placement 4/12 (Dr. Evans), presenting with fevers and malaise. History obtained with daughter translating at bedside. She reports patient voiding normally postprocedure, but that he developed fevers as high as 102F about 2 days after the procedure. He had intermittent fevers over the last 2 weeks and mild left sided pain, but has not had any issues urinating. He saw Dr. Evans in the office 4/18 and was started empirically on cipro. Urine culture resulted with ESBL Ecoli and he was switched to Augmentin on 4/23 with improvement in fevers. He had outpatient CT 4/25 demonstrating emphysematous cystitis and was advised to have a Ahmadi placed. Patient did not want ahmadi and PVR was 0 so he was managed solely with antibiotics. Today his fever returned at 102F and had associated suprapubic/flank pain with increased urinary frequency and incontinence.     In the ED code sepsis was called for VS Tmax 102, , /66. Labs notable for leukocytosis to 23, lactate 3.2, and Cr 2.1 up from 1.6 last admission. Vitals and lactate improved with 1L bolus.    PAST MEDICAL HISTORY:     PAST SURGICAL HISTORY:     HOME MEDICATIONS:    ALLERGIES: No Known Allergies      FAMILY HISTORY:     SOCIAL HISTORY:    REVIEW OF SYSTEMS:    VITAL SIGNS:  ICU Vital Signs Last 24 Hrs  T(C): 37.5 (27 Apr 2024 01:58), Max: 38.9 (26 Apr 2024 21:42)  T(F): 99.5 (27 Apr 2024 01:58), Max: 102 (26 Apr 2024 21:42)  HR: 98 (27 Apr 2024 01:58) (98 - 150)  BP: 108/72 (27 Apr 2024 01:58) (100/66 - 108/72)  BP(mean): --  ABP: --  ABP(mean): --  RR: 22 (27 Apr 2024 01:58) (18 - 22)  SpO2: 98% (27 Apr 2024 01:58) (94% - 98%)    O2 Parameters below as of 26 Apr 2024 21:42  Patient On (Oxygen Delivery Method): room air            PHYSICAL EXAMINATION:  General - well-nourished, no acute distress  Neuro - awake, alert, oriented x4, no acute focal deficits  Lungs - breathing comfortably on room air  Heart - pulse regular  Abdomen - soft, nontender, nondistended; no CVA or suprapubic tenderness  Extremities - all four extremities are warm & pink     LABS:                          13.6   23.85 )-----------( 598      ( 26 Apr 2024 22:39 )             42.0       04-26    134<L>  |  104  |  27<H>  ----------------------------<  138<H>  4.3   |  21<L>  |  2.15<H>    Ca    8.7      26 Apr 2024 22:39    TPro  8.4<H>  /  Alb  2.8<L>  /  TBili  1.1  /  DBili  x   /  AST  68<H>  /  ALT  83<H>  /  AlkPhos  295<H>  04-26      PT/INR - ( 26 Apr 2024 22:39 )   PT: 13.4 sec;   INR: 1.18 ratio         PTT - ( 26 Apr 2024 22:39 )  PTT:27.5 sec    ABG - ( 27 Apr 2024 02:00 )  pH: x     /  pCO2: x     /  pO2: x     / HCO3: x     / Base Excess: x     /  SaO2: x       Lactate: 2.5                    RECENT CULTURES:      CAPILLARY BLOOD GLUCOSE          IMAGING STUDIES:  < from: CT Abdomen and Pelvis No Cont (04.27.24 @ 02:28) >    ACC: [id number] EXAM:  CT ABDOMEN AND PELVIS   ORDERED BY: LIO GUNN     PROCEDURE DATE:  04/27/2024          INTERPRETATION:  CLINICAL INFORMATION: Left flank pain and fever.    COMPARISON: 6 4/25/2024.    CONTRAST/COMPLICATIONS:  IV Contrast:None.  Oral Contrast: None.  Complications: None.    PROCEDURE:  CT of the Abdomen and Pelvis was performed.  Sagittal and coronal reformats were performed.    FINDINGS:  LOWER CHEST: Dependent atelectasis is appreciated. Right posterior medial   osteophyte lung is noted.    LIVER: Calcified right hepatic lobe granuloma. There is a lobulated left   hepatic lobe cyst.  BILE DUCTS: Normal caliber.  GALLBLADDER: Within normal limits.  SPLEEN: Within normal limits.  PANCREAS: Motion is seen in the region of the pancreatic head. Otherwise,   within normal limits.  ADRENALS: Within normal limits.  KIDNEYS/URETERS: There is left-sided nephroureteral stent, in   satisfactory position. There is increased left-sided perinephric   stranding when compared to the previous exam. 2 foci of air are seen   within the left upper pole kidney, which appear parenchymal. There are 2   adjacent stones seen within the left upper pole kidney which measure up   to 3 mm. There are at least 2 discrete stones of the right kidney which   measure up to 3 mm. Additional faint punctate stones may be present   bilaterally. There is a right renal cyst. Indeterminate 2 cm left lower   pole lesion is again appreciated. This would be better evaluated with   renal protocolMRI or CT.    BLADDER: Again appreciated is a thick-walled urinary bladder. Bladder   wall emphysema is again noted. There is a tiny focus of extraluminal air   laterally within the left pelvis. This is possibly associated with a   small vessel. Previously identified additional extra luminal foci of air   are no longer visualized. There is a 4 mm bladder stone within the left   posterior bladder. This likely corresponds to previously noted right   ureterovesicular junction stone.  REPRODUCTIVE ORGANS: Prostate gland is markedly enlarged. There is   nodular indentation of the posterior bladder.    BOWEL: No bowel obstruction. Appendix is normal. There are occasional   colonic diverticula. No acute diverticulitis.  PERITONEUM: No ascites.  VESSELS: Within normal limits.  RETROPERITONEUM/LYMPH NODES: No lymphadenopathy.  ABDOMINAL WALL: Within normal limits.  BONES: Degenerative changes.    IMPRESSION:  Increased left-sided perinephric stranding compared to the previous exam   suggestive of pyelonephritis. There are several foci of air seen at the   left upper pole kidney which appear parenchymal, raising possibility of   emphysematous pyelonephritis. Foci of extra luminal air seen on the   previous exam have diminished with a singular focus of air remaining.    Urinary bladder wall air are again appreciated overall decreased compared   to 4/25/2024 compatible with residua of emphysematous cystitis.    Indeterminate density left lower pole renal lesion for which renal   protocol MRI or CT is again advised.    Marked prostate enlargement. There is nodular indentation along the   posterior bladder wall.      < end of copied text >

## 2024-04-27 NOTE — PHARMACOTHERAPY INTERVENTION NOTE - COMMENTS
[employer] reviewed, no additional interventions at this time.    Culture Date/Time: 2024-04-26 22:30  BCID: -  Escherichia coli  Detec  BCID: -  ESBL  Detec  BCID: -  CTX-M Resistance Marker  Detec

## 2024-04-27 NOTE — CONSULT NOTE ADULT - ASSESSMENT
Mr. Cervantes is a 68 year old man with PMHx DM, prostatic hypertrophy, renal stones s/p L ureteroscopy with stent placement 4/12 (Dr. Evans), presenting with emphysematous urinary tract infection.    Recommendations:  - no acute surgical intervention indicated, stent is in place and should be maintained  - please place ahmadi catheter ASAP  - medical admission and resuscitation  - treatment of ESBL Ecoli    discussed with Dr. Walton Mr. Cervantes is a 68 year old man with PMHx DM, prostatic hypertrophy, renal stones s/p L ureteroscopy with stent placement 4/12 (Dr. Evans), presenting with emphysematous urinary tract infection.    Recommendations:  - no acute surgical intervention indicated, stent is in place and should be maintained  - please place ahmadi catheter ASAP  - medical admission and resuscitation  - treatment of ESBL Ecoli  - BCx    discussed with Dr. Walton

## 2024-04-27 NOTE — PATIENT PROFILE ADULT - PATIENT REPRESENTATIVE: ( YOU CAN CHOOSE ANY PERSON THAT CAN ASSIST YOU WITH YOUR HEALTH CARE PREFERENCES, DOES NOT HAVE TO BE A SPOUSE, IMMEDIATE FAMILY OR SIGNIFICANT OTHER/PARTNER)
Show Aperture Variable?: Yes Render Note In Bullet Format When Appropriate: No Post-Care Instructions: I reviewed with the patient in detail post-care instructions. Patient is to wear sunprotection, and avoid picking at any of the treated lesions. Pt may apply Vaseline to crusted or scabbing areas. Duration Of Freeze Thaw-Cycle (Seconds): 0 Consent: The patient's consent was obtained including but not limited to risks of crusting, scabbing, blistering, scarring, darker or lighter pigmentary change, recurrence, incomplete removal and infection. Number Of Freeze-Thaw Cycles: 1 freeze-thaw cycle Detail Level: Detailed yes

## 2024-04-27 NOTE — H&P ADULT - NSHPREVIEWOFSYSTEMS_GEN_ALL_CORE
CONSTITUTIONAL: (+) fever, chills, decreased appetite  RESPIRATORY: No cough, wheezing, chills or hemoptysis; No shortness of breath  CARDIOVASCULAR: No chest pain, palpitations, dizziness, or leg swelling  GASTROINTESTINAL: (+) nausea, diarrhea No abdominal pain. No vomiting, or hematemesis; No constipation. No melena or hematochezia.  GENITOURINARY: (+) left flank pain, urinary frequency,   NEUROLOGICAL: No headaches, memory loss, loss of strength, numbness, or tremors  ENDOCRINE: No polyuria, polydipsia, or heat/cold intolerance  MUSKULOSKELETAL: No muscle aches, joint pains  HEME: no easy bruisability, no tender or enlarged lymph nodes  SKIN: No itching, burning, rashes, or lesions .

## 2024-04-27 NOTE — ED ADULT NURSE NOTE - NSFALLUNIVINTERV_ED_ALL_ED
Bed/Stretcher in lowest position, wheels locked, appropriate side rails in place/Call bell, personal items and telephone in reach/Instruct patient to call for assistance before getting out of bed/chair/stretcher/Non-slip footwear applied when patient is off stretcher/[place] to call system/Physically safe environment - no spills, clutter or unnecessary equipment/Purposeful proactive rounding/Room/bathroom lighting operational, light cord in reach

## 2024-04-27 NOTE — H&P ADULT - PROBLEM SELECTOR PLAN 1
presenting with fevers, chills, left flank pain  CTAP: Increased left-sided perinephric stranding compared to the previous exam suggestive of pyelonephritis. There are several foci of air seen at the left upper pole kidney which appear parenchymal, raising possibility of   emphysematous pyelonephritis.   recently had L ureterscopy with stent placement on 4/12  outpatient UCx (+) ESBL, sensitive to ertapenem  received 1L IVF in ED  VS on admission: Temp 98.2, HR 97, BP 95/64, RR 18, 98% on RA  cipro changed to augmentin outpt  received darek x 1 dose in ED  will continue with ertapenem 1g qd x 10 days   WBC 23K  lactate 3.2 > 2.5 > continue to trend  maintenance fluids  Urology following: no intervention at this time presenting with fevers, chills, left flank pain  CTAP: Increased left-sided perinephric stranding compared to the previous exam suggestive of pyelonephritis. There are several foci of air seen at the left upper pole kidney which appear parenchymal, raising possibility of   emphysematous pyelonephritis.   recently had L ureteroscopy with stent placement on 4/12  outpatient UCx (+) ESBL, sensitive to ertapenem  received 1L IVF in ED  VS on admission: Temp 98.2, HR 97, BP 95/64, RR 18, 98% on RA  cipro changed to augmentin outpt  received darek x 1 dose in ED  will continue with ertapenem 500g qd x 10 days   WBC 23K  lactate 3.2 > 2.5 > continue to trend  maintenance fluids  Urology following: no intervention at this time

## 2024-04-27 NOTE — CHART NOTE - NSCHARTNOTEFT_GEN_A_CORE
· Subjective and Objective:   Patient seen and examined at bedside. Conversation done in Polish, granddaughter at bedside. He looks clinicaly well, not in distress, states he feels weak. Blood pressure still low, SBP 93/61, in R, 84/55 in L. Will give 4th liter bolus, repeat cbc trend wbc, repeat lactate. low threshold for ICU consult.       REVIEW OF SYSTEMS:  CONSTITUTIONAL: + fever, no weight loss, or fatigue, + generalized weakness  RESPIRATORY: No cough, wheezing, chills or hemoptysis; No shortness of breath  CARDIOVASCULAR: No chest pain, palpitations, dizziness, or leg swelling  GASTROINTESTINAL: No abdominal pain. No nausea, vomiting, or hematemesis; No diarrhea or constipation. No melena or hematochezia.  GENITOURINARY: + flank pain, No dysuria or hematuria, urinary frequency  MUSCULOSKELETAL: No pain, no Limited ROM  NEUROLOGICAL: No headaches, memory loss, loss of strength, numbness, or tremors  SKIN: No itching, burning, rashes, or lesions     MEDICATIONS  (STANDING):  ertapenem  IVPB      heparin   Injectable 5000 Unit(s) SubCutaneous every 8 hours  insulin lispro (ADMELOG) corrective regimen sliding scale   SubCutaneous three times a day before meals  insulin lispro (ADMELOG) corrective regimen sliding scale   SubCutaneous at bedtime  lactated ringers. 1000 milliLiter(s) (75 mL/Hr) IV Continuous <Continuous>  tamsulosin 0.8 milliGRAM(s) Oral at bedtime    MEDICATIONS  (PRN):  acetaminophen     Tablet .. 650 milliGRAM(s) Oral every 6 hours PRN Temp greater or equal to 38C (100.4F), Mild Pain (1 - 3)  aluminum hydroxide/magnesium hydroxide/simethicone Suspension 30 milliLiter(s) Oral every 4 hours PRN Dyspepsia  melatonin 3 milliGRAM(s) Oral at bedtime PRN Insomnia  ondansetron Injectable 4 milliGRAM(s) IV Push every 8 hours PRN Nausea and/or Vomiting      Vital Signs Last 24 Hrs  T(C): 37 (27 Apr 2024 13:41), Max: 38.9 (26 Apr 2024 21:42)  T(F): 98.6 (27 Apr 2024 13:41), Max: 102 (26 Apr 2024 21:42)  HR: 91 (27 Apr 2024 13:41) (87 - 150)  BP: 93/61 (27 Apr 2024 13:41) (89/55 - 108/72)  BP(mean): --  RR: 18 (27 Apr 2024 13:41) (17 - 22)  SpO2: 97% (27 Apr 2024 13:41) (94% - 98%)    Parameters below as of 27 Apr 2024 13:41  Patient On (Oxygen Delivery Method): room air        PHYSICAL EXAMINATION:  GENERAL: NAD, laying in bed  HEAD:  Atraumatic, Normocephalic  EYES:  conjunctiva and sclera clear, no scleral icterus  NECK: Supple, No JVD, Normal thyroid  CHEST/LUNG: Clear to auscultation.  No rales, rhonchi, wheezing, or rubs  HEART: Regular rate and rhythm; No murmurs, rubs, or gallops  ABDOMEN: Soft, Nontender, Nondistended; Bowel sounds present  NERVOUS SYSTEM:  Alert & Oriented X3,  Strength 5/5 in upper and lower extremities, sensation intact  EXTREMITIES:  2+ Peripheral Pulses, No clubbing, cyanosis, or edema  SKIN: warm dry, no lesions noted                          11.1   40.56 )-----------( 478      ( 27 Apr 2024 13:24 )             33.6     04-27    136  |  107  |  26<H>  ----------------------------<  135<H>  4.2   |  22  |  1.57<H>    Ca    8.1<L>      27 Apr 2024 13:24  Phos  2.2     04-27  Mg     1.8     04-27    TPro  6.5  /  Alb  2.0<L>  /  TBili  0.7  /  DBili  x   /  AST  37  /  ALT  57  /  AlkPhos  178<H>  04-27    LIVER FUNCTIONS - ( 27 Apr 2024 13:24 )  Alb: 2.0 g/dL / Pro: 6.5 g/dL / ALK PHOS: 178 U/L / ALT: 57 U/L DA / AST: 37 U/L / GGT: x               PT/INR - ( 26 Apr 2024 22:39 )   PT: 13.4 sec;   INR: 1.18 ratio         PTT - ( 26 Apr 2024 22:39 )  PTT:27.5 sec  I&O's Summary      Culture - Blood (collected 26 Apr 2024 22:30)  Source: .Blood Blood-Peripheral  Gram Stain (27 Apr 2024 14:14):    Growth in aerobic bottle: Gram Negative Rods  Preliminary Report (27 Apr 2024 14:14):    Growth in aerobic bottle: Gram Negative Rods    Culture - Blood (collected 26 Apr 2024 22:15)  Source: .Blood Blood-Peripheral  Gram Stain (27 Apr 2024 14:13):    Growth in aerobic bottle: Gram Negative Rods  Preliminary Report (27 Apr 2024 14:13):    Growth in aerobic bottle: Gram Negative Rods    Direct identification is available within approximately 3-5    hours either by Blood Panel Multiplexed PCR or Direct    MALDI-TOF. Details: [email or web address]        RADIOLOGY & ADDITIONAL TESTS:      < from: CT Abdomen and Pelvis No Cont (04.27.24 @ 02:28) >    ACC: [id number] EXAM:  CT ABDOMEN AND PELVIS   ORDERED BY: LIO GUNN     PROCEDURE DATE:  04/27/2024          INTERPRETATION:  CLINICAL INFORMATION: Left flank pain and fever.    COMPARISON: 6 4/25/2024.    CONTRAST/COMPLICATIONS:  IV Contrast:None.  Oral Contrast: None.  Complications: None.    PROCEDURE:  CT of the Abdomen and Pelvis was performed.  Sagittal and coronal reformats were performed.    FINDINGS:  LOWER CHEST: Dependent atelectasis is appreciated. Right posterior medial   osteophyte lung is noted.    LIVER: Calcified right hepatic lobe granuloma. There is a lobulated left   hepatic lobe cyst.  BILE DUCTS: Normal caliber.  GALLBLADDER: Within normal limits.  SPLEEN: Within normal limits.  PANCREAS: Motion is seen in the region of the pancreatic head. Otherwise,   within normal limits.  ADRENALS: Within normal limits.  KIDNEYS/URETERS: There is left-sided nephroureteral stent, in   satisfactory position. There is increased left-sided perinephric   stranding when compared to the previous exam. 2 foci of air are seen   within the left upper pole kidney, which appear parenchymal. There are 2   adjacent stones seen within the left upper pole kidney which measure up   to 3 mm. There are at least 2 discrete stones of the right kidney which   measure up to 3 mm. Additional faint punctate stones may be present   bilaterally. There is a right renal cyst. Indeterminate 2 cm left lower   pole lesion is again appreciated. This would be better evaluated with   renal protocolMRI or CT.    BLADDER: Again appreciated is a thick-walled urinary bladder. Bladder   wall emphysema is again noted. There is a tiny focus of extraluminal air   laterally within the left pelvis. This is possibly associated with a   small vessel. Previously identified additional extra luminal foci of air   are no longer visualized. There is a 4 mm bladder stone within the left   posterior bladder. This likely corresponds to previously noted right   ureterovesicular junction stone.  REPRODUCTIVE ORGANS: Prostate gland is markedly enlarged. There is   nodular indentation of the posterior bladder.    BOWEL: No bowel obstruction. Appendix is normal. There are occasional   colonic diverticula. No acute diverticulitis.  PERITONEUM: No ascites.  VESSELS: Within normal limits.  RETROPERITONEUM/LYMPH NODES: No lymphadenopathy.  ABDOMINAL WALL: Within normal limits.  BONES: Degenerative changes.    IMPRESSION:  Increased left-sided perinephric stranding compared to the previous exam   suggestive of pyelonephritis. There are several foci of air seen at the   left upper pole kidney which appear parenchymal, raising possibility of   emphysematous pyelonephritis. Foci of extra luminal air seen on the   previous exam have diminished with a singular focus of air remaining.    Urinary bladder wall air are again appreciated overall decreased compared   to 4/25/2024 compatible with residua of emphysematous cystitis.    Indeterminate density left lower pole renal lesion for which renal   protocol MRI or CT is again advised.    Marked prostate enlargement. There is nodular indentation along the   posterior bladder wall.            --- End of Report ---      < end of copied text >          Assessment and Plan:   · Assessment	  68 year old male patient with pmhx DM, HLD, BPH, kidney stones, recent left ureteral stent placement 4/12/24 who presented to the ER due to fever despite outpatient antibiotics.  He has been having fever with poor oral intake and was started on Cipro by his Urologist. Urine culture grew ESBL EColi resistant to Cipro so he was switched to Augmentin on Tuesday. In the ER patient found to have severe sepsis with elevated lactic acid.    A/P  # Severe Sepsis  # Left Pyelonephritis  # Elevated Lactic Acid  # leukocytosis  - ESBL EColi on Outpatient Urine Culture 4/22/2024  - Lactate 3.2 --> 2.5 _> 3.5  - IV Ertapenem  - urine culture and blood culture + for gram - rods  - WBC 23> 51, f/u repeat. monitor wbc  - Follow up repeat Lactate  - Pt still hypotensive, will give 4th L bolus, low threshold for ICU consult  - Urology consulted  - Case discussed with ID Dr. Louis for abx approval.  - Fallon inserted in ER; monitor urine output.   - Tylenol prn        # SHAHBAZ  2/2 Severe Sepsis  - IV Fluid Hydration  - s/p 3 Liters IV fluids, will give 4th liter now  - monitor BMP and kidney function    # Elevated LFTs  2/2 Severe Sepsis  - IV Fluid Hydration  - monitor lfts    # Hx of HLD  - Continue home medication Statin    # Hx of DM  - Insulin Sliding Scale  - Blood Glucose Monitoring    # DVT PPx  - Heparin    # FEN  - IV Fluid Hydration  - Monitor and replete electrolytes as needed  - DASH Diabetic Diet

## 2024-04-27 NOTE — H&P ADULT - ATTENDING COMMENTS
IMAGING  Noncontrast CT A/P  IMPRESSION:  Increased left-sided perinephric stranding compared to the previous exam suggestive of pyelonephritis. There are several foci of air seen at the left upper pole kidney which appear parenchymal, raising possibility of emphysematous pyelonephritis. Foci of extra luminal air seen on the previous exam have diminished with a singular focus of air remaining.  Urinary bladder wall air are again appreciated overall decreased compared to 4/25/2024 compatible with residua of emphysematous cystitis.  Indeterminate density left lower pole renal lesion for which renal protocol MRI or CT is again advised.  Marked prostate enlargement. There is nodular indentation along the posterior bladder wall.    Chest X-Ray   Pending official read; on my read no infiltrate, consolidation, or effusion noted.    EKG  Sinus Tachycardia 113 bpm, QTc 452ms, KY 142ms, on my read no ST segment elevation or depression, no TWI    HPI  68 year old male patient with pmhx DM, HLD, BPH, kidney stones, recent left ureteral stent placement 4/12/24 who presented to the ER due to fever despite outpatient antibiotics.  He has been having fever with poor oral intake and was started on Cipro by his Urologist. Urine culture grew ESBL EColi resistant to Cipro so he was switched to Augmentin on Tuesday. In the ER patient found to have severe sepsis with elevated lactic acid.    Review Of Systems included: + fever, + chills, + poor oral intake, + urinary frequency, + constipation, + 1 episode of diarrhea, + flank pain,   no shortness of breath, no chest pain, no abdominal pain, no dysuria    Daughter at bedside translated as per patient's preference  Physical Exam  General: Awake, Alert, Oriented  Cardiac: RRR  Pulmonary: CTA b/l  Abdominal: Soft, ND, NT, no CVA Tenderness b/l  Extremities: No edema b/l    A/P  # Severe Sepsis  # Left Pyelonephritis  # Elevated Lactic Acid  ESBL EColi on Outpatient Urine Culture 4/22/2024  Lactate 3.2 --> 2.5  - IV Ertapenem  - Follow up Blood and Urine Cultures  - IV Fluid Hydration  - Follow up repeat Lactate  - Urology consulted  - Consult ID  - Fallon inserted in ER; monitor urine output  - Tylenol prn    # SHAHBAZ  2/2 Severe Sepsis  - IV Fluid Hydration    # Elevated LFTs  2/2 Severe Sepsis  - IV Fluid Hydration  - Follow up morning CMP    # Hx of HLD  - Continue home medication Statin    # Hx of DM  - Insulin Sliding Scale  - Blood Glucose Monitoring  - Can monitor blood glucose for 24 hours before starting long acting insulin if needed    # DVT PPx  - Heparin    # FEN  - IV Fluid Hydration  - Monitor and replete electrolytes as needed  - DASH Diabetic Diet    Previous Admissions Included  4/25/2024: Urology Clinic Visit: He had his tamsulosin increased to 0.8 mg daily and was able to void with a PVR of approximately 180 mL while in the hospital. He was having fevers up to 102 and urine culture grew ESBL E. coli. He was started on Augmentin twice daily April 23, 2024 and states he has not had any fever since then. PVR done today 0 ml. Will schedule for cystoscopy stent removal in 1 week however may need to change to IV antibiotics based on urine culture findings  4/18/2024: Urology Clinic Visit: Over the past 2 to 3 days he has been having fevers as high as 102. Will send ua and ucx and treat empirically with cipro 500 mg bid. For his BPH his CT scan demonstrates a prostate of about 80 grams. His tamsulosin was increased to 0.8 mg and started on finasteride.  4/10/2024 - 4/13/2024: 5mm kidney stone. Patient had a left ureteroscopy and stone removal done.    Patient case and management was discussed with ER Attending  I did examine all labs (including CBC, PT/INR, APTT, CMP, Lactate, UA), imaging, prior notes

## 2024-04-27 NOTE — PROGRESS NOTE ADULT - SUBJECTIVE AND OBJECTIVE BOX
Patient seen and examined at bedside. Conversation done in Polish, granddaughter at bedside. He looks clinicaly well, not in distress, states he feels weak. Blood pressure still low, SBP 93/61, in R, 84/55 in L. Will give 4th liter bolus, repeat cbc trend wbc, repeat lactate. low threshold for ICU consult.       REVIEW OF SYSTEMS:  CONSTITUTIONAL: + fever, no weight loss, or fatigue, + generalized weakness  RESPIRATORY: No cough, wheezing, chills or hemoptysis; No shortness of breath  CARDIOVASCULAR: No chest pain, palpitations, dizziness, or leg swelling  GASTROINTESTINAL: No abdominal pain. No nausea, vomiting, or hematemesis; No diarrhea or constipation. No melena or hematochezia.  GENITOURINARY: + flank pain, No dysuria or hematuria, urinary frequency  MUSCULOSKELETAL: No pain, no Limited ROM  NEUROLOGICAL: No headaches, memory loss, loss of strength, numbness, or tremors  SKIN: No itching, burning, rashes, or lesions     MEDICATIONS  (STANDING):  ertapenem  IVPB      heparin   Injectable 5000 Unit(s) SubCutaneous every 8 hours  insulin lispro (ADMELOG) corrective regimen sliding scale   SubCutaneous three times a day before meals  insulin lispro (ADMELOG) corrective regimen sliding scale   SubCutaneous at bedtime  lactated ringers. 1000 milliLiter(s) (75 mL/Hr) IV Continuous <Continuous>  tamsulosin 0.8 milliGRAM(s) Oral at bedtime    MEDICATIONS  (PRN):  acetaminophen     Tablet .. 650 milliGRAM(s) Oral every 6 hours PRN Temp greater or equal to 38C (100.4F), Mild Pain (1 - 3)  aluminum hydroxide/magnesium hydroxide/simethicone Suspension 30 milliLiter(s) Oral every 4 hours PRN Dyspepsia  melatonin 3 milliGRAM(s) Oral at bedtime PRN Insomnia  ondansetron Injectable 4 milliGRAM(s) IV Push every 8 hours PRN Nausea and/or Vomiting      Vital Signs Last 24 Hrs  T(C): 37 (27 Apr 2024 13:41), Max: 38.9 (26 Apr 2024 21:42)  T(F): 98.6 (27 Apr 2024 13:41), Max: 102 (26 Apr 2024 21:42)  HR: 91 (27 Apr 2024 13:41) (87 - 150)  BP: 93/61 (27 Apr 2024 13:41) (89/55 - 108/72)  BP(mean): --  RR: 18 (27 Apr 2024 13:41) (17 - 22)  SpO2: 97% (27 Apr 2024 13:41) (94% - 98%)    Parameters below as of 27 Apr 2024 13:41  Patient On (Oxygen Delivery Method): room air        PHYSICAL EXAMINATION:  GENERAL: NAD, laying in bed  HEAD:  Atraumatic, Normocephalic  EYES:  conjunctiva and sclera clear, no scleral icterus  NECK: Supple, No JVD, Normal thyroid  CHEST/LUNG: Clear to auscultation.  No rales, rhonchi, wheezing, or rubs  HEART: Regular rate and rhythm; No murmurs, rubs, or gallops  ABDOMEN: Soft, Nontender, Nondistended; Bowel sounds present  NERVOUS SYSTEM:  Alert & Oriented X3,  Strength 5/5 in upper and lower extremities, sensation intact  EXTREMITIES:  2+ Peripheral Pulses, No clubbing, cyanosis, or edema  SKIN: warm dry, no lesions noted                          11.1   40.56 )-----------( 478      ( 27 Apr 2024 13:24 )             33.6     04-27    136  |  107  |  26<H>  ----------------------------<  135<H>  4.2   |  22  |  1.57<H>    Ca    8.1<L>      27 Apr 2024 13:24  Phos  2.2     04-27  Mg     1.8     04-27    TPro  6.5  /  Alb  2.0<L>  /  TBili  0.7  /  DBili  x   /  AST  37  /  ALT  57  /  AlkPhos  178<H>  04-27    LIVER FUNCTIONS - ( 27 Apr 2024 13:24 )  Alb: 2.0 g/dL / Pro: 6.5 g/dL / ALK PHOS: 178 U/L / ALT: 57 U/L DA / AST: 37 U/L / GGT: x               PT/INR - ( 26 Apr 2024 22:39 )   PT: 13.4 sec;   INR: 1.18 ratio         PTT - ( 26 Apr 2024 22:39 )  PTT:27.5 sec  I&O's Summary      Culture - Blood (collected 26 Apr 2024 22:30)  Source: .Blood Blood-Peripheral  Gram Stain (27 Apr 2024 14:14):    Growth in aerobic bottle: Gram Negative Rods  Preliminary Report (27 Apr 2024 14:14):    Growth in aerobic bottle: Gram Negative Rods    Culture - Blood (collected 26 Apr 2024 22:15)  Source: .Blood Blood-Peripheral  Gram Stain (27 Apr 2024 14:13):    Growth in aerobic bottle: Gram Negative Rods  Preliminary Report (27 Apr 2024 14:13):    Growth in aerobic bottle: Gram Negative Rods    Direct identification is available within approximately 3-5    hours either by Blood Panel Multiplexed PCR or Direct    MALDI-TOF. Details: [email or web address]        RADIOLOGY & ADDITIONAL TESTS:      < from: CT Abdomen and Pelvis No Cont (04.27.24 @ 02:28) >    ACC: [id number] EXAM:  CT ABDOMEN AND PELVIS   ORDERED BY: LIO GUNN     PROCEDURE DATE:  04/27/2024          INTERPRETATION:  CLINICAL INFORMATION: Left flank pain and fever.    COMPARISON: 6 4/25/2024.    CONTRAST/COMPLICATIONS:  IV Contrast:None.  Oral Contrast: None.  Complications: None.    PROCEDURE:  CT of the Abdomen and Pelvis was performed.  Sagittal and coronal reformats were performed.    FINDINGS:  LOWER CHEST: Dependent atelectasis is appreciated. Right posterior medial   osteophyte lung is noted.    LIVER: Calcified right hepatic lobe granuloma. There is a lobulated left   hepatic lobe cyst.  BILE DUCTS: Normal caliber.  GALLBLADDER: Within normal limits.  SPLEEN: Within normal limits.  PANCREAS: Motion is seen in the region of the pancreatic head. Otherwise,   within normal limits.  ADRENALS: Within normal limits.  KIDNEYS/URETERS: There is left-sided nephroureteral stent, in   satisfactory position. There is increased left-sided perinephric   stranding when compared to the previous exam. 2 foci of air are seen   within the left upper pole kidney, which appear parenchymal. There are 2   adjacent stones seen within the left upper pole kidney which measure up   to 3 mm. There are at least 2 discrete stones of the right kidney which   measure up to 3 mm. Additional faint punctate stones may be present   bilaterally. There is a right renal cyst. Indeterminate 2 cm left lower   pole lesion is again appreciated. This would be better evaluated with   renal protocolMRI or CT.    BLADDER: Again appreciated is a thick-walled urinary bladder. Bladder   wall emphysema is again noted. There is a tiny focus of extraluminal air   laterally within the left pelvis. This is possibly associated with a   small vessel. Previously identified additional extra luminal foci of air   are no longer visualized. There is a 4 mm bladder stone within the left   posterior bladder. This likely corresponds to previously noted right   ureterovesicular junction stone.  REPRODUCTIVE ORGANS: Prostate gland is markedly enlarged. There is   nodular indentation of the posterior bladder.    BOWEL: No bowel obstruction. Appendix is normal. There are occasional   colonic diverticula. No acute diverticulitis.  PERITONEUM: No ascites.  VESSELS: Within normal limits.  RETROPERITONEUM/LYMPH NODES: No lymphadenopathy.  ABDOMINAL WALL: Within normal limits.  BONES: Degenerative changes.    IMPRESSION:  Increased left-sided perinephric stranding compared to the previous exam   suggestive of pyelonephritis. There are several foci of air seen at the   left upper pole kidney which appear parenchymal, raising possibility of   emphysematous pyelonephritis. Foci of extra luminal air seen on the   previous exam have diminished with a singular focus of air remaining.    Urinary bladder wall air are again appreciated overall decreased compared   to 4/25/2024 compatible with residua of emphysematous cystitis.    Indeterminate density left lower pole renal lesion for which renal   protocol MRI or CT is again advised.    Marked prostate enlargement. There is nodular indentation along the   posterior bladder wall.            --- End of Report ---      < end of copied text >

## 2024-04-27 NOTE — CONSULT NOTE ADULT - NS ATTEND AMEND GEN_ALL_CORE FT
Pt seen and examined. Agree with note as written above. Resting comfortably with no complaints. Fallon in place draining clear yellow urine    - Plan as above  - Continue indwelling catheter

## 2024-04-27 NOTE — ED PROVIDER NOTE - OBJECTIVE STATEMENT
68-year-old male Slovak speaking with history of diabetes, hyperlipidemia, kidney stones status left ureteral calculus removal with stent placement by Dr. Evans 4/12 here for fever, chills and worsening left flank pain today.  Patient is on Augmentin since Tuesday.  Last saw Dr. Evans Thursday.  Patient was recommended indwelling catheter due to infection and prostate enlargement.  Patient denies any vomiting, headache, cough, chest pain, shortness of breath.  Accompanied by daughter.

## 2024-04-28 LAB
ALBUMIN SERPL ELPH-MCNC: 2 G/DL — LOW (ref 3.5–5)
ANION GAP SERPL CALC-SCNC: 6 MMOL/L — SIGNIFICANT CHANGE UP (ref 5–17)
BUN SERPL-MCNC: 22 MG/DL — HIGH (ref 7–18)
CALCIUM SERPL-MCNC: 7.9 MG/DL — LOW (ref 8.4–10.5)
CHLORIDE SERPL-SCNC: 110 MMOL/L — HIGH (ref 96–108)
CO2 SERPL-SCNC: 22 MMOL/L — SIGNIFICANT CHANGE UP (ref 22–31)
CREAT SERPL-MCNC: 1.52 MG/DL — HIGH (ref 0.5–1.3)
EGFR: 49 ML/MIN/1.73M2 — LOW
GLUCOSE BLDC GLUCOMTR-MCNC: 125 MG/DL — HIGH (ref 70–99)
GLUCOSE BLDC GLUCOMTR-MCNC: 132 MG/DL — HIGH (ref 70–99)
GLUCOSE BLDC GLUCOMTR-MCNC: 194 MG/DL — HIGH (ref 70–99)
GLUCOSE BLDC GLUCOMTR-MCNC: 78 MG/DL — SIGNIFICANT CHANGE UP (ref 70–99)
GLUCOSE SERPL-MCNC: 160 MG/DL — HIGH (ref 70–99)
HCT VFR BLD CALC: 33.8 % — LOW (ref 39–50)
HGB BLD-MCNC: 11.2 G/DL — LOW (ref 13–17)
LACTATE SERPL-SCNC: 2.6 MMOL/L — HIGH (ref 0.7–2)
LACTATE SERPL-SCNC: 2.9 MMOL/L — HIGH (ref 0.7–2)
MAGNESIUM SERPL-MCNC: 2 MG/DL — SIGNIFICANT CHANGE UP (ref 1.6–2.6)
MCHC RBC-ENTMCNC: 28.8 PG — SIGNIFICANT CHANGE UP (ref 27–34)
MCHC RBC-ENTMCNC: 33.1 GM/DL — SIGNIFICANT CHANGE UP (ref 32–36)
MCV RBC AUTO: 86.9 FL — SIGNIFICANT CHANGE UP (ref 80–100)
NRBC # BLD: 0 /100 WBCS — SIGNIFICANT CHANGE UP (ref 0–0)
PHOSPHATE SERPL-MCNC: 1.9 MG/DL — LOW (ref 2.5–4.5)
PLATELET # BLD AUTO: 461 K/UL — HIGH (ref 150–400)
POTASSIUM SERPL-MCNC: 4.1 MMOL/L — SIGNIFICANT CHANGE UP (ref 3.5–5.3)
POTASSIUM SERPL-SCNC: 4.1 MMOL/L — SIGNIFICANT CHANGE UP (ref 3.5–5.3)
RBC # BLD: 3.89 M/UL — LOW (ref 4.2–5.8)
RBC # FLD: 15 % — HIGH (ref 10.3–14.5)
SODIUM SERPL-SCNC: 138 MMOL/L — SIGNIFICANT CHANGE UP (ref 135–145)
WBC # BLD: 24.76 K/UL — HIGH (ref 3.8–10.5)
WBC # FLD AUTO: 24.76 K/UL — HIGH (ref 3.8–10.5)

## 2024-04-28 PROCEDURE — 99232 SBSQ HOSP IP/OBS MODERATE 35: CPT

## 2024-04-28 RX ORDER — SODIUM CHLORIDE 9 MG/ML
1000 INJECTION INTRAMUSCULAR; INTRAVENOUS; SUBCUTANEOUS
Refills: 0 | Status: DISCONTINUED | OUTPATIENT
Start: 2024-04-28 | End: 2024-05-03

## 2024-04-28 RX ORDER — SODIUM CHLORIDE 9 MG/ML
1000 INJECTION, SOLUTION INTRAVENOUS ONCE
Refills: 0 | Status: COMPLETED | OUTPATIENT
Start: 2024-04-28 | End: 2024-04-28

## 2024-04-28 RX ORDER — SODIUM CHLORIDE 9 MG/ML
500 INJECTION INTRAMUSCULAR; INTRAVENOUS; SUBCUTANEOUS ONCE
Refills: 0 | Status: COMPLETED | OUTPATIENT
Start: 2024-04-28 | End: 2024-04-28

## 2024-04-28 RX ADMIN — SODIUM CHLORIDE 75 MILLILITER(S): 9 INJECTION INTRAMUSCULAR; INTRAVENOUS; SUBCUTANEOUS at 11:55

## 2024-04-28 RX ADMIN — SODIUM CHLORIDE 1000 MILLILITER(S): 9 INJECTION, SOLUTION INTRAVENOUS at 21:08

## 2024-04-28 RX ADMIN — HEPARIN SODIUM 5000 UNIT(S): 5000 INJECTION INTRAVENOUS; SUBCUTANEOUS at 21:18

## 2024-04-28 RX ADMIN — SODIUM CHLORIDE 500 MILLILITER(S): 9 INJECTION INTRAMUSCULAR; INTRAVENOUS; SUBCUTANEOUS at 11:58

## 2024-04-28 RX ADMIN — Medication 650 MILLIGRAM(S): at 20:56

## 2024-04-28 RX ADMIN — HEPARIN SODIUM 5000 UNIT(S): 5000 INJECTION INTRAVENOUS; SUBCUTANEOUS at 13:09

## 2024-04-28 RX ADMIN — HEPARIN SODIUM 5000 UNIT(S): 5000 INJECTION INTRAVENOUS; SUBCUTANEOUS at 06:26

## 2024-04-28 RX ADMIN — TAMSULOSIN HYDROCHLORIDE 0.8 MILLIGRAM(S): 0.4 CAPSULE ORAL at 21:18

## 2024-04-28 RX ADMIN — ERTAPENEM SODIUM 100 MILLIGRAM(S): 1 INJECTION, POWDER, LYOPHILIZED, FOR SOLUTION INTRAMUSCULAR; INTRAVENOUS at 06:26

## 2024-04-28 RX ADMIN — Medication 650 MILLIGRAM(S): at 20:26

## 2024-04-28 RX ADMIN — Medication 1: at 11:57

## 2024-04-28 RX ADMIN — SODIUM CHLORIDE 75 MILLILITER(S): 9 INJECTION INTRAMUSCULAR; INTRAVENOUS; SUBCUTANEOUS at 06:26

## 2024-04-28 NOTE — CHART NOTE - NSCHARTNOTEFT_GEN_A_CORE
EVENT: Lactate f/u       Vital Signs Last 24 Hrs  T(C): 36.8 (28 Apr 2024 04:30), Max: 37.1 (27 Apr 2024 20:37)  T(F): 98.2 (28 Apr 2024 04:30), Max: 98.8 (27 Apr 2024 20:37)  HR: 101 (28 Apr 2024 04:30) (90 - 101)  BP: 113/73 (28 Apr 2024 04:30) (93/61 - 113/73)  RR: 17 (28 Apr 2024 04:30) (16 - 18)  SpO2: 98% (28 Apr 2024 04:30) (96% - 98%)    Parameters below as of 28 Apr 2024 04:30  Patient On (Oxygen Delivery Method): room air        LABS:                        11.2   24.76 )-----------( 461      ( 28 Apr 2024 05:50 )             33.8     04-28    138  |  110<H>  |  22<H>  ----------------------------<  160<H>  4.1   |  22  |  1.52<H>    Ca    7.9<L>      28 Apr 2024 05:50  Phos  1.9     04-28  Mg     2.0     04-28    TPro  x   /  Alb  2.0<L>  /  TBili  x   /  DBili  x   /  AST  x   /  ALT  x   /  AlkPhos  x   04-28      EKG:   IMAGING:    ASSESSMENT:  68 year old, male, with PMH of DM, HLD, BPH, kidney stones, recent left ureteral stent placement 4/12/24 who presented to the ER due to fever despite outpatient Cipro switched to Augmentin per OP Urologist.  Admitted for Severe sepsis 2/2 pyelonephritis.    Elevated Lactic Acid  - 1/2L NS bolus x i.  C/w Maintenance IVF as ordered.     - Continue to monitor.  Repeat Lactate in PM-f/u results

## 2024-04-28 NOTE — PROGRESS NOTE ADULT - SUBJECTIVE AND OBJECTIVE BOX
Patient seen and examined at bedside. No events overnight.  Patient is doing much better, Blood pressure holding in high 90s and low 100s with Map > 65. he states he feels alot better. Denies any acute complains. Creatinine and wbc are improving. Conversation done in Tajik by myself, family at the bedside.     REVIEW OF SYSTEMS:  CONSTITUTIONAL: No fever, weight loss, or fatigue  RESPIRATORY: No cough, wheezing, chills or hemoptysis; No shortness of breath  CARDIOVASCULAR: No chest pain, palpitations, dizziness, or leg swelling  GASTROINTESTINAL: No abdominal pain. No nausea, vomiting, or hematemesis; No diarrhea or constipation. No melena or hematochezia.  GENITOURINARY: No dysuria or hematuria, urinary frequency  MUSCULOSKELETAL: No pain, no Limited ROM  NEUROLOGICAL: No headaches, memory loss, loss of strength, numbness, or tremors  SKIN: No itching, burning, rashes, or lesions     MEDICATIONS  (STANDING):  ertapenem  IVPB 500 milliGRAM(s) IV Intermittent every 24 hours  ertapenem  IVPB      heparin   Injectable 5000 Unit(s) SubCutaneous every 8 hours  insulin lispro (ADMELOG) corrective regimen sliding scale   SubCutaneous three times a day before meals  insulin lispro (ADMELOG) corrective regimen sliding scale   SubCutaneous at bedtime  sodium chloride 0.9%. 1000 milliLiter(s) (75 mL/Hr) IV Continuous <Continuous>  tamsulosin 0.8 milliGRAM(s) Oral at bedtime    MEDICATIONS  (PRN):  acetaminophen     Tablet .. 650 milliGRAM(s) Oral every 6 hours PRN Temp greater or equal to 38C (100.4F), Mild Pain (1 - 3)  aluminum hydroxide/magnesium hydroxide/simethicone Suspension 30 milliLiter(s) Oral every 4 hours PRN Dyspepsia  melatonin 3 milliGRAM(s) Oral at bedtime PRN Insomnia  ondansetron Injectable 4 milliGRAM(s) IV Push every 8 hours PRN Nausea and/or Vomiting      Vital Signs Last 24 Hrs  T(C): 36.9 (28 Apr 2024 16:30), Max: 37.1 (27 Apr 2024 20:37)  T(F): 98.5 (28 Apr 2024 16:30), Max: 98.8 (27 Apr 2024 20:37)  HR: 85 (28 Apr 2024 14:31) (85 - 101)  BP: 120/68 (28 Apr 2024 14:31) (96/59 - 120/68)  BP(mean): --  RR: 18 (28 Apr 2024 14:31) (16 - 18)  SpO2: 99% (28 Apr 2024 14:31) (96% - 99%)    Parameters below as of 28 Apr 2024 14:31  Patient On (Oxygen Delivery Method): room air        PHYSICAL EXAMINATION:  GENERAL: NAD, laying comfortably in bed  HEAD:  Atraumatic, Normocephalic  EYES:  conjunctiva and sclera clear, no scleral icterus  NECK: Supple, No JVD, Normal thyroid  CHEST/LUNG: Clear to auscultation.  No rales, rhonchi, wheezing, or rubs  HEART: Regular rate and rhythm; No murmurs, rubs, or gallops  ABDOMEN: Soft, Nontender, Nondistended; Bowel sounds present  NERVOUS SYSTEM:  Alert & Oriented X3,  Strength 5/5 in upper and lower extremities, sensation intact  EXTREMITIES:  2+ Peripheral Pulses, No clubbing, cyanosis, or edema  SKIN: warm dry, no lesions noted                          11.2   24.76 )-----------( 461      ( 28 Apr 2024 05:50 )             33.8     04-28    138  |  110<H>  |  22<H>  ----------------------------<  160<H>  4.1   |  22  |  1.52<H>    Ca    7.9<L>      28 Apr 2024 05:50  Phos  1.9     04-28  Mg     2.0     04-28    TPro  x   /  Alb  2.0<L>  /  TBili  x   /  DBili  x   /  AST  x   /  ALT  x   /  AlkPhos  x   04-28    LIVER FUNCTIONS - ( 28 Apr 2024 05:50 )  Alb: 2.0 g/dL / Pro: x     / ALK PHOS: x     / ALT: x     / AST: x     / GGT: x               PT/INR - ( 26 Apr 2024 22:39 )   PT: 13.4 sec;   INR: 1.18 ratio         PTT - ( 26 Apr 2024 22:39 )  PTT:27.5 sec  I&O's Summary    27 Apr 2024 07:01  -  28 Apr 2024 07:00  --------------------------------------------------------  IN: 0 mL / OUT: 2625 mL / NET: -2625 mL    28 Apr 2024 07:01  -  28 Apr 2024 17:51  --------------------------------------------------------  IN: 0 mL / OUT: 1000 mL / NET: -1000 mL        Culture - Blood (collected 26 Apr 2024 22:30)  Source: .Blood Blood-Peripheral  Gram Stain (27 Apr 2024 14:14):    Growth in aerobic bottle: Gram Negative Rods  Preliminary Report (28 Apr 2024 12:30):    Growth in aerobic bottle: Escherichia coli    Direct identification is available within approximately 3-5    hours either by Blood Panel Multiplexed PCR or Direct    MALDI-TOF. Details: [email or web address]  Organism: Blood Culture PCR (27 Apr 2024 15:48)  Organism: Blood Culture PCR (27 Apr 2024 15:48)    Culture - Blood (collected 26 Apr 2024 22:15)  Source: .Blood Blood-Peripheral  Gram Stain (27 Apr 2024 14:52):    Growth in aerobic bottle: Gram Negative Rods    Growth in anaerobic bottle: Gram Negative Rods  Preliminary Report (28 Apr 2024 12:14):    Growth in aerobic and anaerobic bottles: Escherichia coli    See previous culture [id number]    Direct identification is available within approximately 3-5    hours either by Blood Panel Multiplexed PCR or Direct    MALDI-TOF. Details: [email or web address]        RADIOLOGY & ADDITIONAL TESTS:

## 2024-04-29 DIAGNOSIS — R78.81 BACTEREMIA: ICD-10-CM

## 2024-04-29 LAB
-  AMPICILLIN/SULBACTAM: SIGNIFICANT CHANGE UP
-  AMPICILLIN: SIGNIFICANT CHANGE UP
-  AZTREONAM: SIGNIFICANT CHANGE UP
-  CEFAZOLIN: SIGNIFICANT CHANGE UP
-  CEFEPIME: SIGNIFICANT CHANGE UP
-  CEFTRIAXONE: SIGNIFICANT CHANGE UP
-  CIPROFLOXACIN: SIGNIFICANT CHANGE UP
-  ERTAPENEM: SIGNIFICANT CHANGE UP
-  GENTAMICIN: SIGNIFICANT CHANGE UP
-  IMIPENEM: SIGNIFICANT CHANGE UP
-  LEVOFLOXACIN: SIGNIFICANT CHANGE UP
-  MEROPENEM: SIGNIFICANT CHANGE UP
-  PIPERACILLIN/TAZOBACTAM: SIGNIFICANT CHANGE UP
-  TOBRAMYCIN: SIGNIFICANT CHANGE UP
-  TRIMETHOPRIM/SULFAMETHOXAZOLE: SIGNIFICANT CHANGE UP
ALBUMIN SERPL ELPH-MCNC: 2 G/DL — LOW (ref 3.5–5)
ANION GAP SERPL CALC-SCNC: 10 MMOL/L — SIGNIFICANT CHANGE UP (ref 5–17)
BACTERIA UR CULT: NORMAL
BUN SERPL-MCNC: 13 MG/DL — SIGNIFICANT CHANGE UP (ref 7–18)
CALCIUM SERPL-MCNC: 7.8 MG/DL — LOW (ref 8.4–10.5)
CHLORIDE SERPL-SCNC: 108 MMOL/L — SIGNIFICANT CHANGE UP (ref 96–108)
CO2 SERPL-SCNC: 19 MMOL/L — LOW (ref 22–31)
CREAT SERPL-MCNC: 1.44 MG/DL — HIGH (ref 0.5–1.3)
CULTURE RESULTS: ABNORMAL
CULTURE RESULTS: ABNORMAL
EGFR: 53 ML/MIN/1.73M2 — LOW
GLUCOSE BLDC GLUCOMTR-MCNC: 125 MG/DL — HIGH (ref 70–99)
GLUCOSE BLDC GLUCOMTR-MCNC: 126 MG/DL — HIGH (ref 70–99)
GLUCOSE BLDC GLUCOMTR-MCNC: 128 MG/DL — HIGH (ref 70–99)
GLUCOSE BLDC GLUCOMTR-MCNC: 145 MG/DL — HIGH (ref 70–99)
GLUCOSE SERPL-MCNC: 140 MG/DL — HIGH (ref 70–99)
HCT VFR BLD CALC: 34.5 % — LOW (ref 39–50)
HGB BLD-MCNC: 11.3 G/DL — LOW (ref 13–17)
LACTATE SERPL-SCNC: 1.1 MMOL/L — SIGNIFICANT CHANGE UP (ref 0.7–2)
MAGNESIUM SERPL-MCNC: 2 MG/DL — SIGNIFICANT CHANGE UP (ref 1.6–2.6)
MCHC RBC-ENTMCNC: 28 PG — SIGNIFICANT CHANGE UP (ref 27–34)
MCHC RBC-ENTMCNC: 32.8 GM/DL — SIGNIFICANT CHANGE UP (ref 32–36)
MCV RBC AUTO: 85.6 FL — SIGNIFICANT CHANGE UP (ref 80–100)
METHOD TYPE: SIGNIFICANT CHANGE UP
NRBC # BLD: 0 /100 WBCS — SIGNIFICANT CHANGE UP (ref 0–0)
ORGANISM # SPEC MICROSCOPIC CNT: ABNORMAL
PHOSPHATE SERPL-MCNC: 2.3 MG/DL — LOW (ref 2.5–4.5)
PLATELET # BLD AUTO: 461 K/UL — HIGH (ref 150–400)
POTASSIUM SERPL-MCNC: 4 MMOL/L — SIGNIFICANT CHANGE UP (ref 3.5–5.3)
POTASSIUM SERPL-SCNC: 4 MMOL/L — SIGNIFICANT CHANGE UP (ref 3.5–5.3)
RBC # BLD: 4.03 M/UL — LOW (ref 4.2–5.8)
RBC # FLD: 15 % — HIGH (ref 10.3–14.5)
SODIUM SERPL-SCNC: 137 MMOL/L — SIGNIFICANT CHANGE UP (ref 135–145)
SPECIMEN SOURCE: SIGNIFICANT CHANGE UP
SPECIMEN SOURCE: SIGNIFICANT CHANGE UP
SURGICAL PATHOLOGY STUDY: SIGNIFICANT CHANGE UP
WBC # BLD: 17.49 K/UL — HIGH (ref 3.8–10.5)
WBC # FLD AUTO: 17.49 K/UL — HIGH (ref 3.8–10.5)

## 2024-04-29 PROCEDURE — 99233 SBSQ HOSP IP/OBS HIGH 50: CPT

## 2024-04-29 RX ORDER — ERTAPENEM SODIUM 1 G/1
INJECTION, POWDER, LYOPHILIZED, FOR SOLUTION INTRAMUSCULAR; INTRAVENOUS
Refills: 0 | Status: DISCONTINUED | OUTPATIENT
Start: 2024-04-29 | End: 2024-05-03

## 2024-04-29 RX ORDER — ACETAMINOPHEN 500 MG
1000 TABLET ORAL ONCE
Refills: 0 | Status: COMPLETED | OUTPATIENT
Start: 2024-04-29 | End: 2024-04-29

## 2024-04-29 RX ORDER — ERTAPENEM SODIUM 1 G/1
1000 INJECTION, POWDER, LYOPHILIZED, FOR SOLUTION INTRAMUSCULAR; INTRAVENOUS EVERY 24 HOURS
Refills: 0 | Status: DISCONTINUED | OUTPATIENT
Start: 2024-04-30 | End: 2024-05-03

## 2024-04-29 RX ORDER — ERTAPENEM SODIUM 1 G/1
500 INJECTION, POWDER, LYOPHILIZED, FOR SOLUTION INTRAMUSCULAR; INTRAVENOUS ONCE
Refills: 0 | Status: COMPLETED | OUTPATIENT
Start: 2024-04-29 | End: 2024-04-29

## 2024-04-29 RX ADMIN — Medication 400 MILLIGRAM(S): at 05:42

## 2024-04-29 RX ADMIN — TAMSULOSIN HYDROCHLORIDE 0.8 MILLIGRAM(S): 0.4 CAPSULE ORAL at 21:35

## 2024-04-29 RX ADMIN — ERTAPENEM SODIUM 100 MILLIGRAM(S): 1 INJECTION, POWDER, LYOPHILIZED, FOR SOLUTION INTRAMUSCULAR; INTRAVENOUS at 06:17

## 2024-04-29 RX ADMIN — HEPARIN SODIUM 5000 UNIT(S): 5000 INJECTION INTRAVENOUS; SUBCUTANEOUS at 05:51

## 2024-04-29 RX ADMIN — Medication 1000 MILLIGRAM(S): at 06:12

## 2024-04-29 RX ADMIN — HEPARIN SODIUM 5000 UNIT(S): 5000 INJECTION INTRAVENOUS; SUBCUTANEOUS at 21:35

## 2024-04-29 RX ADMIN — HEPARIN SODIUM 5000 UNIT(S): 5000 INJECTION INTRAVENOUS; SUBCUTANEOUS at 13:04

## 2024-04-29 RX ADMIN — ERTAPENEM SODIUM 100 MILLIGRAM(S): 1 INJECTION, POWDER, LYOPHILIZED, FOR SOLUTION INTRAMUSCULAR; INTRAVENOUS at 11:41

## 2024-04-29 NOTE — PROGRESS NOTE ADULT - SUBJECTIVE AND OBJECTIVE BOX
NP Note discussed with  primary attending    Patient is a 68y old  Male who presents with a chief complaint of septic shock 2/2 pyelonephritis (29 Apr 2024 11:06)      INTERVAL HPI/OVERNIGHT EVENTS: Pt seen w/ Granddaughter at bedside.  Pt denies SOB,  or abdominal pain.  Reports "a little" HA since Friday, 4/26, that's relieved by Tylenol.  Reports LBM today, diarrhea.  Reports diarrhea since Sat., 4/27 -Catracho # [id number].    MEDICATIONS  (STANDING):  ertapenem  IVPB 500 milliGRAM(s) IV Intermittent once  ertapenem  IVPB      heparin   Injectable 5000 Unit(s) SubCutaneous every 8 hours  insulin lispro (ADMELOG) corrective regimen sliding scale   SubCutaneous three times a day before meals  insulin lispro (ADMELOG) corrective regimen sliding scale   SubCutaneous at bedtime  sodium chloride 0.9%. 1000 milliLiter(s) (75 mL/Hr) IV Continuous <Continuous>  tamsulosin 0.8 milliGRAM(s) Oral at bedtime    MEDICATIONS  (PRN):  acetaminophen     Tablet .. 650 milliGRAM(s) Oral every 6 hours PRN Temp greater or equal to 38C (100.4F), Mild Pain (1 - 3)  aluminum hydroxide/magnesium hydroxide/simethicone Suspension 30 milliLiter(s) Oral every 4 hours PRN Dyspepsia  melatonin 3 milliGRAM(s) Oral at bedtime PRN Insomnia  ondansetron Injectable 4 milliGRAM(s) IV Push every 8 hours PRN Nausea and/or Vomiting      __________________________________________________  REVIEW OF SYSTEMS:    CONSTITUTIONAL: No fever  EYES: No acute visual disturbances  NECK: No pain or stiffness  RESPIRATORY: No cough; No shortness of breath  CARDIOVASCULAR: No chest pain, no palpitations  GASTROINTESTINAL: No pain. No nausea or vomiting.  (+) Diarrhea.     NEUROLOGICAL: (+) Headache.  Denies numbness, no tremors  MUSCULOSKELETAL: No joint pain, no muscle pain  GENITOURINARY: No dysuria, no frequency, no hesitancy  PSYCHIATRY: No depression , no anxiety  ALL OTHER  ROS negative        Vital Signs Last 24 Hrs  T(C): 37.1 (29 Apr 2024 06:44), Max: 38.1 (28 Apr 2024 20:15)  T(F): 98.7 (29 Apr 2024 06:44), Max: 100.6 (28 Apr 2024 20:15)  HR: 95 (29 Apr 2024 05:03) (85 - 106)  BP: 112/71 (29 Apr 2024 05:03) (112/71 - 137/66)  BP(mean): 84 (28 Apr 2024 20:15) (84 - 84)  RR: 18 (29 Apr 2024 05:03) (18 - 18)  SpO2: 96% (29 Apr 2024 05:03) (96% - 100%)    Parameters below as of 29 Apr 2024 05:03  Patient On (Oxygen Delivery Method): room air        ________________________________________________  PHYSICAL EXAM:  GENERAL: NAD  HEENT: Normocephalic;  conjunctivae and sclerae clear; moist mucous membranes   NECK : Supple  CHEST/LUNG: Clear to auscultation bilaterally with good air entry   HEART: S1 S2  regular; no murmurs, gallops or rubs  ABDOMEN: Soft, Nontender, Nondistended; Bowel sounds present x 4 quad.  (+) Fallon.  EXTREMITIES: No cyanosis; no edema; no calf tenderness  SKIN: Warm and dry; no rash  NERVOUS SYSTEM:  Awake and alert; Oriented to place, person and time; no new deficits    _________________________________________________  LABS:                        11.3   17.49 )-----------( 461      ( 29 Apr 2024 06:00 )             34.5     04-29    137  |  108  |  13  ----------------------------<  140<H>  4.0   |  19<L>  |  1.44<H>    Ca    7.8<L>      29 Apr 2024 06:00  Phos  2.3     04-29  Mg     2.0     04-29    TPro  x   /  Alb  2.0<L>  /  TBili  x   /  DBili  x   /  AST  x   /  ALT  x   /  AlkPhos  x   04-29      Urinalysis Basic - ( 29 Apr 2024 06:00 )    Color: x / Appearance: x / SG: x / pH: x  Gluc: 140 mg/dL / Ketone: x  / Bili: x / Urobili: x   Blood: x / Protein: x / Nitrite: x   Leuk Esterase: x / RBC: x / WBC x   Sq Epi: x / Non Sq Epi: x / Bacteria: x      CAPILLARY BLOOD GLUCOSE      POCT Blood Glucose.: 126 mg/dL (29 Apr 2024 07:22)  POCT Blood Glucose.: 125 mg/dL (28 Apr 2024 21:49)  POCT Blood Glucose.: 78 mg/dL (28 Apr 2024 16:35)  POCT Blood Glucose.: 194 mg/dL (28 Apr 2024 11:46)        RADIOLOGY & ADDITIONAL TESTS:    Imaging Personally Reviewed:  YES    Consultant(s) Notes Reviewed:   YES    Care Discussed with Consultants :     Plan of care was discussed with patient and /or primary care giver; all questions and concerns were addressed and care was aligned with patient's wishes.     NP Note discussed with  primary attending    Patient is a 68y old  Male who presents with a chief complaint of septic shock 2/2 pyelonephritis (29 Apr 2024 11:06)      INTERVAL HPI/OVERNIGHT EVENTS: Pt seen w/ Granddaughter at bedside.  Pt denies SOB,  or abdominal pain.  Reports "a little" HA since Friday, 4/26, that's relieved by Tylenol.  Reports LBM today, diarrhea.  Reports diarrhea since Sat., 4/27 -[place]racho # [id number].    MEDICATIONS  (STANDING):  ertapenem  IVPB 500 milliGRAM(s) IV Intermittent once  ertapenem  IVPB      heparin   Injectable 5000 Unit(s) SubCutaneous every 8 hours  insulin lispro (ADMELOG) corrective regimen sliding scale   SubCutaneous three times a day before meals  insulin lispro (ADMELOG) corrective regimen sliding scale   SubCutaneous at bedtime  sodium chloride 0.9%. 1000 milliLiter(s) (75 mL/Hr) IV Continuous <Continuous>  tamsulosin 0.8 milliGRAM(s) Oral at bedtime    MEDICATIONS  (PRN):  acetaminophen     Tablet .. 650 milliGRAM(s) Oral every 6 hours PRN Temp greater or equal to 38C (100.4F), Mild Pain (1 - 3)  aluminum hydroxide/magnesium hydroxide/simethicone Suspension 30 milliLiter(s) Oral every 4 hours PRN Dyspepsia  melatonin 3 milliGRAM(s) Oral at bedtime PRN Insomnia  ondansetron Injectable 4 milliGRAM(s) IV Push every 8 hours PRN Nausea and/or Vomiting      __________________________________________________  REVIEW OF SYSTEMS:    CONSTITUTIONAL: Febrile overnight  EYES: No acute visual disturbances  NECK: No pain or stiffness  RESPIRATORY: No cough; No shortness of breath  CARDIOVASCULAR: No chest pain, no palpitations  GASTROINTESTINAL: No pain. No nausea or vomiting.  (+) Diarrhea.     NEUROLOGICAL: (+) Headache.  Denies numbness, no tremors  MUSCULOSKELETAL: No joint pain, no muscle pain  GENITOURINARY: No dysuria, no frequency, no hesitancy  PSYCHIATRY: No depression , no anxiety  ALL OTHER  ROS negative        Vital Signs Last 24 Hrs  T(C): 37.1 (29 Apr 2024 06:44), Max: 38.1 (28 Apr 2024 20:15)  T(F): 98.7 (29 Apr 2024 06:44), Max: 100.6 (28 Apr 2024 20:15)  HR: 95 (29 Apr 2024 05:03) (85 - 106)  BP: 112/71 (29 Apr 2024 05:03) (112/71 - 137/66)  BP(mean): 84 (28 Apr 2024 20:15) (84 - 84)  RR: 18 (29 Apr 2024 05:03) (18 - 18)  SpO2: 96% (29 Apr 2024 05:03) (96% - 100%)    Parameters below as of 29 Apr 2024 05:03  Patient On (Oxygen Delivery Method): room air        ________________________________________________  PHYSICAL EXAM:  GENERAL: NAD  HEENT: Normocephalic;  conjunctivae and sclerae clear; moist mucous membranes   NECK : Supple  CHEST/LUNG: Clear to auscultation bilaterally with good air entry   HEART: S1 S2  regular; no murmurs, gallops or rubs  ABDOMEN: Soft, Nontender, Nondistended; Bowel sounds present x 4 quad.  (+) Fallon.  EXTREMITIES: No cyanosis; no edema; no calf tenderness  SKIN: Warm and dry; no rash  NERVOUS SYSTEM:  Awake and alert; Oriented to place, person and time; no new deficits    _________________________________________________  LABS:                        11.3   17.49 )-----------( 461      ( 29 Apr 2024 06:00 )             34.5     04-29    137  |  108  |  13  ----------------------------<  140<H>  4.0   |  19<L>  |  1.44<H>    Ca    7.8<L>      29 Apr 2024 06:00  Phos  2.3     04-29  Mg     2.0     04-29    TPro  x   /  Alb  2.0<L>  /  TBili  x   /  DBili  x   /  AST  x   /  ALT  x   /  AlkPhos  x   04-29      Urinalysis Basic - ( 29 Apr 2024 06:00 )    Color: x / Appearance: x / SG: x / pH: x  Gluc: 140 mg/dL / Ketone: x  / Bili: x / Urobili: x   Blood: x / Protein: x / Nitrite: x   Leuk Esterase: x / RBC: x / WBC x   Sq Epi: x / Non Sq Epi: x / Bacteria: x      CAPILLARY BLOOD GLUCOSE      POCT Blood Glucose.: 126 mg/dL (29 Apr 2024 07:22)  POCT Blood Glucose.: 125 mg/dL (28 Apr 2024 21:49)  POCT Blood Glucose.: 78 mg/dL (28 Apr 2024 16:35)  POCT Blood Glucose.: 194 mg/dL (28 Apr 2024 11:46)        RADIOLOGY & ADDITIONAL TESTS:    Imaging Personally Reviewed:  YES    Consultant(s) Notes Reviewed:   YES    Care Discussed with Consultants :     Plan of care was discussed with patient and /or primary care giver; all questions and concerns were addressed and care was aligned with patient's wishes.

## 2024-04-29 NOTE — DIETITIAN INITIAL EVALUATION ADULT - PERTINENT MEDS FT
MEDICATIONS  (STANDING):  ertapenem  IVPB 500 milliGRAM(s) IV Intermittent once  ertapenem  IVPB      heparin   Injectable 5000 Unit(s) SubCutaneous every 8 hours  insulin lispro (ADMELOG) corrective regimen sliding scale   SubCutaneous three times a day before meals  insulin lispro (ADMELOG) corrective regimen sliding scale   SubCutaneous at bedtime  sodium chloride 0.9%. 1000 milliLiter(s) (75 mL/Hr) IV Continuous <Continuous>  tamsulosin 0.8 milliGRAM(s) Oral at bedtime    MEDICATIONS  (PRN):  acetaminophen     Tablet .. 650 milliGRAM(s) Oral every 6 hours PRN Temp greater or equal to 38C (100.4F), Mild Pain (1 - 3)  aluminum hydroxide/magnesium hydroxide/simethicone Suspension 30 milliLiter(s) Oral every 4 hours PRN Dyspepsia  melatonin 3 milliGRAM(s) Oral at bedtime PRN Insomnia  ondansetron Injectable 4 milliGRAM(s) IV Push every 8 hours PRN Nausea and/or Vomiting

## 2024-04-29 NOTE — DIETITIAN INITIAL EVALUATION ADULT - ORAL INTAKE PTA/DIET HISTORY
granddaughter at bedside, translated for patient in Occitan. patient agrees . reports adequate oral intake PTA

## 2024-04-29 NOTE — DIETITIAN INITIAL EVALUATION ADULT - OTHER INFO
reports involuntary weight loss of 3% from usual in 10 days PTA, (70 To 67kg) . Has No food allergies. conducted NPFE On patient where has some areas show moderate fat and muscle loss and others mild.  reports involuntary weight loss of 3% from usual in 10 days PTA, (70 To 67kg) . Has No food allergies. conducted NPFE On patient where some areas show moderate fat and muscle loss and others mild.

## 2024-04-29 NOTE — DIETITIAN INITIAL EVALUATION ADULT - PERTINENT LABORATORY DATA
04-29    137  |  108  |  13  ----------------------------<  140<H>  4.0   |  19<L>  |  1.44<H>    Ca    7.8<L>      29 Apr 2024 06:00  Phos  2.3     04-29  Mg     2.0     04-29    TPro  x   /  Alb  2.0<L>  /  TBili  x   /  DBili  x   /  AST  x   /  ALT  x   /  AlkPhos  x   04-29  POCT Blood Glucose.: 126 mg/dL (04-29-24 @ 07:22)  A1C with Estimated Average Glucose Result: 6.6 % (04-11-24 @ 08:15)

## 2024-04-29 NOTE — PROGRESS NOTE ADULT - PROBLEM SELECTOR PLAN 1
-  likely etiology   - 4/26 (+) Bld cx-ESBL E. Coli   - 4/29 Repeat Bld cx requested-f/u results   - Rest below  - ID-Dr. Louis-f/u recommendations

## 2024-04-29 NOTE — PROGRESS NOTE ADULT - PROBLEM SELECTOR PLAN 2
- P/w presenting with fevers, chills, left flank pain.  OP UCx (+) ESBL, sensitive to Ertapenem  - S/p 1L IVF in ED  - Met sepsis on admission: Temp 98.2, HR 97, BP 95/64, RR 18, 98% on RA, WBC 23K  - Lactate normalized   - Rest below

## 2024-04-29 NOTE — PROGRESS NOTE ADULT - SUBJECTIVE AND OBJECTIVE BOX
Subjective  febrile overnight.    Objective    Vital signs  T(F): , Max: 100.6 (04-28-24 @ 20:15)  HR: 95 (04-29-24 @ 05:03)  BP: 112/71 (04-29-24 @ 05:03)  SpO2: 96% (04-29-24 @ 05:03)  Wt(kg): --    Output     OUT:    Indwelling Catheter - Urethral (mL): 3300 mL  Total OUT: 3300 mL    Total NET: -3300 mL          Gen: NAD  Abd: soft, nontender, nondistended  : ahmadi secured in place, draining CYU    Labs      04-29 @ 06:00    WBC 17.49 / Hct 34.5  / SCr 1.44     04-28 @ 05:50    WBC 24.76 / Hct 33.8  / SCr 1.52         Culture - Blood (collected 04-26-24 @ 22:30)  Source: .Blood Blood-Peripheral  Gram Stain (04-27-24 @ 14:14):    Growth in aerobic bottle: Gram Negative Rods  Final Report (04-29-24 @ 09:00):    Growth in aerobic bottle: Escherichia coli ESBL    Direct identification is available within approximately 3-5    hours either by Blood Panel Multiplexed PCR or Direct    MALDI-TOF. Details: [email or web address]  Organism: Blood Culture PCR  Escherichia coli ESBL (04-29-24 @ 09:00)  Organism: Escherichia coli ESBL (04-29-24 @ 09:00)      Method Type: CHANDA      -  Ampicillin: R >16 These ampicillin results predict results for amoxicillin      -  Ampicillin/Sulbactam: R 8/4      -  Aztreonam: R 16      -  Cefazolin: R >16      -  Cefepime: R >16      -  Ceftriaxone: R >32      -  Ciprofloxacin: R >2      -  Ertapenem: S <=0.5      -  Gentamicin: S <=2      -  Imipenem: S <=1      -  Levofloxacin: R >4      -  Meropenem: S <=1      -  Piperacillin/Tazobactam: R <=8      -  Tobramycin: S <=2      -  Trimethoprim/Sulfamethoxazole: R >2/38  Organism: Blood Culture PCR (04-29-24 @ 09:00)      Method Type: PCR      -  Escherichia coli: Detec      -  ESBL: Detec      -  CTX-M Resistance Marker: Detec    Culture - Blood (collected 04-26-24 @ 22:15)  Source: .Blood Blood-Peripheral  Gram Stain (04-27-24 @ 14:52):    Growth in aerobic bottle: Gram Negative Rods    Growth in anaerobic bottle: Gram Negative Rods  Final Report (04-29-24 @ 08:59):    Growth in aerobic and anaerobic bottles: Escherichia coli ESBL    See previous culture [id number]    Direct identification is available within approximately 3-5    hours either by Blood Panel Multiplexed PCR or Direct    MALDI-TOF. Details: [email or web address]        Urine Cx: ?  Blood Cx: ?    Imaging

## 2024-04-29 NOTE — PROGRESS NOTE ADULT - PROBLEM SELECTOR PLAN 3
H/o Recent L. ureteroscopy with stent placement on 4/12  - P/w Fevers, chills, left flank pain  - CTAP showed increased left-sided perinephric stranding compared to the previous exam suggestive of pyelonephritis. There are several foci of air seen at the left upper pole kidney which appear parenchymal, raising possibility of emphysematous pyelonephritis.   outpatient UCx (+) ESBL, sensitive to ertapenem  - (+) UA.  Ucx pending-f/u results   - S/p IVF   - C/w Meropenem   - Urology-Dr. Walton  - Rest above

## 2024-04-29 NOTE — PHARMACOTHERAPY INTERVENTION NOTE - COMMENTS
Blood culture result and susceptibility reviewed  eGFR 53 mL/min/1.73m2    Patient Name: STEVEN [facility]: [facility]  MRN: [id number]  Location: [address]  Culture Date/Time: 2024-04-26 22:30  BCID: -  Escherichia coli  Detec  BCID: -  ESBL  Detec  BCID: -  CTX-M Resistance Marker  Detec Blood culture result and susceptibility reviewed; given ertapenem 500 mg x1 at 06:17  eGFR 53 mL/min/1.73m2    [facility]  MRN: [id number]  Location: [address]  Culture Date/Time: 2024-04-26 22:30  BCID: -  Escherichia coli  Detec  BCID: -  ESBL  Detec  BCID: -  CTX-M Resistance Marker  Detec  ---------------------------------  Organism  Escherichia coli ESBL (Prelim)    Organism  Escherichia coli ESBL (Final)    Method Type  CHANDA  -  Trimethoprim/Sulfamethoxazole  R >2/38  -  Tobramycin  S <=2  -  Piperacillin/Tazobactam  R <=8  -  Meropenem  S <=1  -  Levofloxacin  R >4  -  Imipenem  S <=1  -  Gentamicin  S <=2  -  Ertapenem  S <=0.5  -  Ciprofloxacin  R >2  -  Ceftriaxone  R >32  -  Cefepime  R >16  -  Cefazolin  R >16  -  Aztreonam  R 16  -  Ampicillin/Sulbactam  R 8/4  -  Ampicillin  R >16 These ampicillin results predict results for amoxicillin

## 2024-04-29 NOTE — PROVIDER CONTACT NOTE (CRITICAL VALUE NOTIFICATION) - TEST AND RESULT REPORTED:
Growth In Aerobic / Anaerobic Bottle Escherichia Ecoli ESBL . collected 22:15  Growth In Aerobic Bottle Escherichia Ecoli ESBL collected on 22:30
Gram negative rods in aerobic blood culture 4/26
WBC: 40.56
WBC 51.09

## 2024-04-29 NOTE — DIETITIAN INITIAL EVALUATION ADULT - PROBLEM SELECTOR PROBLEM 2
uses b/l hearing aides/Mildly to Moderately Impaired: difficulty hearing in some environments or speaker may need to increase volume or speak distinctly
Pyelonephritis

## 2024-04-29 NOTE — DIETITIAN INITIAL EVALUATION ADULT - PROBLEM SELECTOR PLAN 2
presenting with fevers, chills, left flank pain  CTAP: Increased left-sided perinephric stranding compared to the previous exam suggestive of pyelonephritis. There are several foci of air seen at the left upper pole kidney which appear parenchymal, raising possibility of   emphysematous pyelonephritis.   recently had L ureterscopy with stent placement on 4/12  outpatient UCx (+) ESBL, sensitive to ertapenem  received 1L IVF in ED  VS on admission: Temp 98.2, HR 97, BP 95/64, RR 18, 98% on RA  cipro changed to augmentin outpt  received darek x 1 dose in ED  will continue with ertapenem 500g qd x 10 days   WBC 23K  lactate 3.2 > 2.5 > continue to trend  maintenance fluids  Urology following: no intervention at this time

## 2024-04-29 NOTE — DIETITIAN INITIAL EVALUATION ADULT - PROBLEM SELECTOR PLAN 1
presenting with fevers, chills, left flank pain  CTAP: Increased left-sided perinephric stranding compared to the previous exam suggestive of pyelonephritis. There are several foci of air seen at the left upper pole kidney which appear parenchymal, raising possibility of   emphysematous pyelonephritis.   recently had L ureteroscopy with stent placement on 4/12  outpatient UCx (+) ESBL, sensitive to ertapenem  received 1L IVF in ED  VS on admission: Temp 98.2, HR 97, BP 95/64, RR 18, 98% on RA  cipro changed to augmentin outpt  received darek x 1 dose in ED  will continue with ertapenem 500g qd x 10 days   WBC 23K  lactate 3.2 > 2.5 > continue to trend  maintenance fluids  Urology following: no intervention at this time

## 2024-04-29 NOTE — PROGRESS NOTE ADULT - PROBLEM SELECTOR PLAN 4
- Downtrended   - P/w SCr 2.15 on admission  - Likely 2/2 obstruction   - C/w Fallon   - S/p IVF  - Continue to monitor

## 2024-04-30 ENCOUNTER — TRANSCRIPTION ENCOUNTER (OUTPATIENT)
Age: 69
End: 2024-04-30

## 2024-04-30 LAB
ALBUMIN SERPL ELPH-MCNC: 2.2 G/DL — LOW (ref 3.5–5)
ALP SERPL-CCNC: 151 U/L — HIGH (ref 40–120)
ALT FLD-CCNC: 41 U/L DA — SIGNIFICANT CHANGE UP (ref 10–60)
ANION GAP SERPL CALC-SCNC: 7 MMOL/L — SIGNIFICANT CHANGE UP (ref 5–17)
AST SERPL-CCNC: 33 U/L — SIGNIFICANT CHANGE UP (ref 10–40)
BILIRUB SERPL-MCNC: 0.6 MG/DL — SIGNIFICANT CHANGE UP (ref 0.2–1.2)
BUN SERPL-MCNC: 14 MG/DL — SIGNIFICANT CHANGE UP (ref 7–18)
CALCIUM SERPL-MCNC: 8.4 MG/DL — SIGNIFICANT CHANGE UP (ref 8.4–10.5)
CHLORIDE SERPL-SCNC: 108 MMOL/L — SIGNIFICANT CHANGE UP (ref 96–108)
CO2 SERPL-SCNC: 22 MMOL/L — SIGNIFICANT CHANGE UP (ref 22–31)
CREAT SERPL-MCNC: 1.47 MG/DL — HIGH (ref 0.5–1.3)
EGFR: 51 ML/MIN/1.73M2 — LOW
GLUCOSE BLDC GLUCOMTR-MCNC: 107 MG/DL — HIGH (ref 70–99)
GLUCOSE BLDC GLUCOMTR-MCNC: 119 MG/DL — HIGH (ref 70–99)
GLUCOSE BLDC GLUCOMTR-MCNC: 122 MG/DL — HIGH (ref 70–99)
GLUCOSE BLDC GLUCOMTR-MCNC: 159 MG/DL — HIGH (ref 70–99)
GLUCOSE BLDC GLUCOMTR-MCNC: 171 MG/DL — HIGH (ref 70–99)
GLUCOSE SERPL-MCNC: 121 MG/DL — HIGH (ref 70–99)
HCT VFR BLD CALC: 36.2 % — LOW (ref 39–50)
HGB BLD-MCNC: 11.8 G/DL — LOW (ref 13–17)
MAGNESIUM SERPL-MCNC: 2.1 MG/DL — SIGNIFICANT CHANGE UP (ref 1.6–2.6)
MCHC RBC-ENTMCNC: 27.8 PG — SIGNIFICANT CHANGE UP (ref 27–34)
MCHC RBC-ENTMCNC: 32.6 GM/DL — SIGNIFICANT CHANGE UP (ref 32–36)
MCV RBC AUTO: 85.4 FL — SIGNIFICANT CHANGE UP (ref 80–100)
NRBC # BLD: 0 /100 WBCS — SIGNIFICANT CHANGE UP (ref 0–0)
PHOSPHATE SERPL-MCNC: 3 MG/DL — SIGNIFICANT CHANGE UP (ref 2.5–4.5)
PLATELET # BLD AUTO: 476 K/UL — HIGH (ref 150–400)
POTASSIUM SERPL-MCNC: 3.7 MMOL/L — SIGNIFICANT CHANGE UP (ref 3.5–5.3)
POTASSIUM SERPL-SCNC: 3.7 MMOL/L — SIGNIFICANT CHANGE UP (ref 3.5–5.3)
PROT SERPL-MCNC: 7.2 G/DL — SIGNIFICANT CHANGE UP (ref 6–8.3)
RBC # BLD: 4.24 M/UL — SIGNIFICANT CHANGE UP (ref 4.2–5.8)
RBC # FLD: 14.8 % — HIGH (ref 10.3–14.5)
SODIUM SERPL-SCNC: 137 MMOL/L — SIGNIFICANT CHANGE UP (ref 135–145)
WBC # BLD: 12.24 K/UL — HIGH (ref 3.8–10.5)
WBC # FLD AUTO: 12.24 K/UL — HIGH (ref 3.8–10.5)

## 2024-04-30 RX ADMIN — Medication 1: at 11:57

## 2024-04-30 RX ADMIN — Medication 3 MILLIGRAM(S): at 21:05

## 2024-04-30 RX ADMIN — HEPARIN SODIUM 5000 UNIT(S): 5000 INJECTION INTRAVENOUS; SUBCUTANEOUS at 14:05

## 2024-04-30 RX ADMIN — ERTAPENEM SODIUM 120 MILLIGRAM(S): 1 INJECTION, POWDER, LYOPHILIZED, FOR SOLUTION INTRAMUSCULAR; INTRAVENOUS at 10:58

## 2024-04-30 RX ADMIN — TAMSULOSIN HYDROCHLORIDE 0.8 MILLIGRAM(S): 0.4 CAPSULE ORAL at 21:04

## 2024-04-30 RX ADMIN — HEPARIN SODIUM 5000 UNIT(S): 5000 INJECTION INTRAVENOUS; SUBCUTANEOUS at 21:04

## 2024-04-30 RX ADMIN — HEPARIN SODIUM 5000 UNIT(S): 5000 INJECTION INTRAVENOUS; SUBCUTANEOUS at 05:04

## 2024-04-30 NOTE — DISCHARGE NOTE PROVIDER - ATTENDING DISCHARGE PHYSICAL EXAMINATION:
Seen and examined this AM, GURDEEP Melo providing translation. Patient with no complaints, still with flank pain while urinating. Abd soft nontennder, no cva tenderness, lower ext no edema.

## 2024-04-30 NOTE — DISCHARGE NOTE PROVIDER - NSDCMRMEDTOKEN_GEN_ALL_CORE_FT
Flomax 0.4 mg oral capsule: 1 cap(s) orally 2 times a day  metFORMIN 1000 mg oral tablet: 1 tab(s) orally 2 times a day  Percocet 5 mg-325 mg oral tablet: 1 tab(s) orally every 6 hours MDD: 20   10Ml Normal Saline Flush before and after IV antibiotic dose: .  CBC w/diff, CMP, ESR, CRP every week: .  ertapenem 1 g injection: 1 gram(s) injectable every 24 hours please continue until 5/13/24  Flomax 0.4 mg oral capsule: 1 cap(s) orally 2 times a day  metFORMIN 1000 mg oral tablet: 1 tab(s) orally 2 times a day  Percocet 5 mg-325 mg oral tablet: 1 tab(s) orally every 6 hours MDD: 20  Remove PICC line 5/13 after last dose of antibiotics: .   10Ml Normal Saline Flush before and after IV antibiotic dose: .  CBC w/diff, CMP, ESR, CRP every week: .  ertapenem 1 g injection: 1 gram(s) injectable every 24 hours please continue until 5/13/24  metFORMIN 1000 mg oral tablet: 1 tab(s) orally 2 times a day  Remove PICC line 5/13 after last dose of antibiotics.: .  tamsulosin 0.4 mg oral capsule: 2 cap(s) orally once a day (at bedtime)

## 2024-04-30 NOTE — DISCHARGE NOTE PROVIDER - HOSPITAL COURSE
Pt is a 68M PMH DM presenting to the ED due to fevers and chills. Pt states that he recently had a L ureteroscopy with stent placement on 4/12 with Dr. Evans.  After he was discharged, he has been having intermittent fevers. He had a follow up appointment with Dr. Evans on 4/18 and was empirically started on cipro. His outpatient urine cultures were positive for ESBL and his antibiotics were switched to Augmentin on 4/23. 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 Last night, he had another fever of 102F and continued having left flank pain, non-radiating, sharp, and intermittent. His daughter also reports that he has been having decreased appetite. While in the ED, he was experiencing urinary frequency and had one episode of diarrhea. He denies CP, SOB, vomiting, abdominal pain, dysuria, numbness/tingling/weakness in extremities.    He was admitted for severe sepsis 2/2 pyelonephritis.  His CT scan showed increased left-sided perinephric stranding compared to the previous exam suggestive of pyelonephritis. There are several foci of air seen at the left upper pole kidney which appear parenchymal, raising possibility of emphysematous pyelonephritis. recently had L ureteroscopy with stent placement on 4/12 outpatient UCx (+) ESBL, sensitive to ertapenem.  He was treated with IV ertapenem 500g qd x 10 days.  His WBC 23K  lactate 3.2 > 2.5 > continue to trend maintenance fluids.     Urology was consulted and recommended no intervention at this time.      INCOMPLETE:::::::::4/30   Pt is a 68M PMH DM presenting to the ED due to fevers and chills. Pt states that he recently had a L ureteroscopy with stent placement on 4/12 with Dr. Evans.  After he was discharged, he has been having intermittent fevers. He had a follow up appointment with Dr. Evans on 4/18 and was empirically started on cipro. His outpatient urine cultures were positive for ESBL and his antibiotics were switched to Augmentin on 4/23. 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 Last night, he had another fever of 102F and continued having left flank pain, non-radiating, sharp, and intermittent. His daughter also reports that he has been having decreased appetite. While in the ED, he was experiencing urinary frequency and had one episode of diarrhea. He denies CP, SOB, vomiting, abdominal pain, dysuria, numbness/tingling/weakness in extremities.  He was admitted for severe sepsis 2/2 pyelonephritis.  His CT scan showed increased left-sided perinephric stranding compared to the previous exam suggestive of pyelonephritis. There are several foci of air seen at the left upper pole kidney which appear parenchymal, raising possibility of emphysematous pyelonephritis. recently had L ureteroscopy with stent placement on 4/12 outpatient UCx (+) ESBL, sensitive to ertapenem.  He was treated with IV ertapenem 500g qd x 10 days.   lactate 3.2 > 2.5 > continue to trend maintenance fluids.     Urology was consulted and recommended no intervention during hospital stay, will need outpatient urological stent management. patient has urology appt on may 10th with Dr. Edmund Evans for stent removal.   Extended dwell placed on 5/2/24.            Pt is a 68M PMH DM presenting to the ED due to fevers and chills. Pt states that he recently had a L ureteroscopy with stent placement on 4/12 with Dr. Evans.  After he was discharged, he had been having intermittent fevers. He had a follow up appointment with Dr. Evans on 4/18 and was empirically started on cipro. His outpatient urine cultures were positive for ESBL and his antibiotics were switched to Augmentin on 4/23. 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 Last night, he had another fever of 102F and continued having left flank pain, non-radiating, sharp, and intermittent. His daughter also reports that he has been having decreased appetite. While in the ED, he was experiencing urinary frequency and had one episode of diarrhea. He denies CP, SOB, vomiting, abdominal pain, dysuria, numbness/tingling/weakness in extremities.  Admitted for severe sepsis 2/2 pyelonephritis.  His CT scan showed increased left-sided perinephric stranding compared to the previous exam suggestive of pyelonephritis. There are several foci of air seen at the left upper pole kidney which appear parenchymal, raising possibility of emphysematous pyelonephritis. recently had L ureteroscopy with stent placement on 4/12 outpatient UCx (+) ESBL, sensitive to ertapenem.  He was treated with IV ertapenem 500g qd x 10 days.   lactate 3.2 > 2.5 > continue to trend maintenance fluids.     Urology was consulted and recommended no intervention during hospital stay, will need outpatient urological stent management. patient has urology appt on may 10th with Dr. Edmund Evans for stent removal.   Extended dwell placed on 5/2/24.            Pt is a 68M PMH DM presenting to the ED due to fevers and chills. Pt states that he recently had a L ureteroscopy with stent placement on 4/12 with Dr. Evans.  After he was discharged, he had been having intermittent fevers. He had a follow up appointment with Dr. Evans on 4/18 and was empirically started on cipro. His outpatient urine cultures were positive for ESBL and his antibiotics were switched to Augmentin on 4/23. 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 Last night, he had another fever of 102F and continued having left flank pain, non-radiating, sharp, and intermittent. His daughter also reports that he has been having decreased appetite. While in the ED, he was experiencing urinary frequency and had one episode of diarrhea. He denies CP, SOB, vomiting, abdominal pain, dysuria, numbness/tingling/weakness in extremities.  Admitted for severe sepsis 2/2 pyelonephritis.  His CT scan showed increased left-sided perinephric stranding compared to the previous exam suggestive of pyelonephritis. There are several foci of air seen at the left upper pole kidney which appear parenchymal, raising possibility of emphysematous pyelonephritis. recently had L ureteroscopy with stent placement on 4/12 outpatient UCx (+) ESBL, sensitive to ertapenem.  He will need treatement with IV ertapenem 500g qd x 10 days until 5/13/24     Urology was consulted and recommended no intervention during hospital stay, will need outpatient urological stent management. patient has urology appt on may 10th with Dr. Edmund Evans for stent removal.   Extended dwell placed on 5/2/24.   CM assisted with outpatient home infusion set-up    Given clinical course decision made to discharge patient.   Discharge discussed with attending.  Note this is just a brief course for full course please refer to daily progress and consult notes.

## 2024-04-30 NOTE — PROGRESS NOTE ADULT - SUBJECTIVE AND OBJECTIVE BOX
NP Note discussed with  primary attending    Patient is a 68y old  Male who presents with a chief complaint of septic shock 2/2 pyelonephritis (30 Apr 2024 06:55)      INTERVAL HPI/OVERNIGHT EVENTS: Pt seen w/ Granddaughter at bedside.  Pt denies SOB,  or abdominal pain.  Again reports "a little" HA since Friday, 4/26, that's relieved by Tylenol.  Reports LBM last night, diarrhea.  Reports diarrhea since Sat., 4/27 -Lily #[id number].    MEDICATIONS  (STANDING):  ertapenem  IVPB      ertapenem  IVPB 1000 milliGRAM(s) IV Intermittent every 24 hours  heparin   Injectable 5000 Unit(s) SubCutaneous every 8 hours  insulin lispro (ADMELOG) corrective regimen sliding scale   SubCutaneous three times a day before meals  insulin lispro (ADMELOG) corrective regimen sliding scale   SubCutaneous at bedtime  sodium chloride 0.9%. 1000 milliLiter(s) (75 mL/Hr) IV Continuous <Continuous>  tamsulosin 0.8 milliGRAM(s) Oral at bedtime    MEDICATIONS  (PRN):  acetaminophen     Tablet .. 650 milliGRAM(s) Oral every 6 hours PRN Temp greater or equal to 38C (100.4F), Mild Pain (1 - 3)  aluminum hydroxide/magnesium hydroxide/simethicone Suspension 30 milliLiter(s) Oral every 4 hours PRN Dyspepsia  melatonin 3 milliGRAM(s) Oral at bedtime PRN Insomnia  ondansetron Injectable 4 milliGRAM(s) IV Push every 8 hours PRN Nausea and/or Vomiting      __________________________________________________  REVIEW OF SYSTEMS:    CONSTITUTIONAL: Febrile overnight  EYES: No acute visual disturbances  NECK: No pain or stiffness  RESPIRATORY: No cough; No shortness of breath  CARDIOVASCULAR: No chest pain, no palpitations  GASTROINTESTINAL: No pain. No nausea or vomiting.  (+) Diarrhea.     NEUROLOGICAL: (+) Headache.  Denies numbness, no tremors  MUSCULOSKELETAL: No joint pain, no muscle pain  GENITOURINARY: No dysuria, no frequency, no hesitancy  PSYCHIATRY: No depression , no anxiety  ALL OTHER  ROS negative        Vital Signs Last 24 Hrs  T(C): 37.2 (30 Apr 2024 12:22), Max: 37.2 (30 Apr 2024 12:22)  T(F): 98.9 (30 Apr 2024 12:22), Max: 98.9 (30 Apr 2024 12:22)  HR: 98 (30 Apr 2024 12:22) (88 - 98)  BP: 107/68 (30 Apr 2024 12:22) (99/64 - 119/77)  RR: 16 (30 Apr 2024 12:22) (16 - 18)  SpO2: 96% (30 Apr 2024 12:22) (96% - 97%)    Parameters below as of 30 Apr 2024 12:22  Patient On (Oxygen Delivery Method): room air        ________________________________________________  PHYSICAL EXAM:  GENERAL: NAD  HEENT: Normocephalic;  conjunctivae and sclerae clear; moist mucous membranes   NECK : Supple  CHEST/LUNG: Clear to auscultation bilaterally with good air entry   HEART: S1 S2  regular; no murmurs, gallops or rubs  ABDOMEN: Soft, Nontender, Nondistended; Bowel sounds present x 4 quad.  (+) Fallon.  EXTREMITIES: No cyanosis; no edema; no calf tenderness  SKIN: Warm and dry; no rash  NERVOUS SYSTEM:  Awake and alert; Oriented to place, person and time; no new deficits  _________________________________________________  LABS:                        11.8   12.24 )-----------( 476      ( 30 Apr 2024 06:05 )             36.2     04-30    137  |  108  |  14  ----------------------------<  121<H>  3.7   |  22  |  1.47<H>    Ca    8.4      30 Apr 2024 06:05  Phos  3.0     04-30  Mg     2.1     04-30    TPro  7.2  /  Alb  2.2<L>  /  TBili  0.6  /  DBili  x   /  AST  33  /  ALT  41  /  AlkPhos  151<H>  04-30      Urinalysis Basic - ( 30 Apr 2024 06:05 )    Color: x / Appearance: x / SG: x / pH: x  Gluc: 121 mg/dL / Ketone: x  / Bili: x / Urobili: x   Blood: x / Protein: x / Nitrite: x   Leuk Esterase: x / RBC: x / WBC x   Sq Epi: x / Non Sq Epi: x / Bacteria: x      CAPILLARY BLOOD GLUCOSE      POCT Blood Glucose.: 159 mg/dL (30 Apr 2024 11:31)  POCT Blood Glucose.: 122 mg/dL (30 Apr 2024 07:30)  POCT Blood Glucose.: 145 mg/dL (29 Apr 2024 20:57)  POCT Blood Glucose.: 125 mg/dL (29 Apr 2024 16:30)        RADIOLOGY & ADDITIONAL TESTS:    Imaging Personally Reviewed:  YES    Consultant(s) Notes Reviewed:   YES    Care Discussed with Consultants :     Plan of care was discussed with patient and /or primary care giver; all questions and concerns were addressed and care was aligned with patient's wishes.

## 2024-04-30 NOTE — PROGRESS NOTE ADULT - PROBLEM SELECTOR PLAN 1
-  likely etiology   - 4/26 (+) Bld cx-ESBL E. Coli   - 4/29 Repeat Bld cx requested-f/u results   - Rest below  - ID-Dr. Louis-f/u recommendations -  likely etiology   - 4/26 (+) Bld cx-ESBL E. Coli   - 4/29 Repeat Bld cx requested-f/u results   - Rest below  - 4/30 NP discussed w/ ID-Dr. Louis.  Per Dr. Louis, Dr. Franklin to cover-f/u ID for abx duration

## 2024-04-30 NOTE — DISCHARGE NOTE PROVIDER - PROVIDER TOKENS
FREE:[LAST:[nina],FIRST:[jeramy],PHONE:[(   )    -],FAX:[(   )    -]],FREE:[LAST:[NINA],FIRST:[JERAMY],PHONE:[phone]],FAX:[(   )    -]]

## 2024-04-30 NOTE — DISCHARGE NOTE PROVIDER - NSDCCPCAREPLAN_GEN_ALL_CORE_FT
PRINCIPAL DISCHARGE DIAGNOSIS  Diagnosis: Sepsis secondary to UTI  Assessment and Plan of Treatment: You presented to the hospital with fever and poor oral intake with recent left ureteral stent placement 4/12/24.   urine culture and blood culture grew ESBL EColi.   you were evaluated by Urology team and infectious disease doctor.   you were treated by IV antibiotics   repeat blood cultures on 4/29 have no growth to date   You will need to continue taking IV antibiotics, Ertapenem, until May 13th.   Please continue to take it exactly as prescribed  Extended dwell IV was placed, it may be removed after last dose of Antibiotics.   Follow up with Dr. Evans, urology on May 10th at 8:40AM for follow up and stent removal.         SECONDARY DISCHARGE DIAGNOSES  Diagnosis: Pyelonephritis  Assessment and Plan of Treatment: this is inflammation of  the kidney due to a bacterial infection, most likely due to UTI  Continue to take your IV antibiotics, ertapenem as prescribed until completed.   Follow up with urology and PCP   continue to have plenty of water to remain hydrated   you may have symptoms as list below   -- back pain during urination, blood in urine dysuria , fatigue   If you have severe pain,  fever and bloody urine please reach out to your primary care physician      Diagnosis: SHAHBAZ (acute kidney injury)  Assessment and Plan of Treatment: Found to have SHAHBAZ (acute kidney injury) with creatinine 2.15 on admission. this is likely due to obstruction.   You were treated with IV fluids and Ahmdai catheter.   Your ahmadi catheter was removed and you were able to urinate freely on your own.   your Creatinine has improved.   Continue to remain hydrated   Follow up with your PCP and Urologist for further evaluation and treatment plan.   Remain Hydrated   Avoid taking (NSAIDs) - (ex: Ibuprofen, Advil, Celebrex, Naprosyn)  Avoid taking any nephrotoxic agents (can harm kidneys) - Intravenous contrast for diagnostic testing, combination cold medications.  Have all medications adjusted for your renal function by your Health Care Provider.  Blood pressure control is important.  Take all medication as prescribed.    Diagnosis: Elevated LFTs  Assessment and Plan of Treatment: Noted with Elevated LFTs.   Likely from severe sepsis   treated with IV fluids and Statin was held   Follow up with PCP for monitoring    Diagnosis: DM (diabetes mellitus)  Assessment and Plan of Treatment: HgA1C this admission: 6.6  Make sure you get your HgA1c checked every three months.  If you take oral diabetes medications, check your blood glucose two times a day.  If you take insulin, check your blood glucose before meals and at bedtime.  It's important not to skip any meals.  Keep a log of your blood glucose results and always take it with you to your doctor appointments.  Keep a list of your current medications including injectables and over the counter medications and bring this medication list with you to all your doctor appointments.  If you have not seen your ophthalmologist this year call for appointment.  Check your feet daily for redness, sores, or openings. Do not self treat. If no improvement in two days call your primary care physician for an appointment.  Low blood sugar (hypoglycemia) is a blood sugar below 70mg/dl. Check your blood sugar if you feel signs/symptoms of hypoglycemia. If your blood sugar is below 70 take 15 grams of carbohydrates (ex 4 oz of apple juice, 3-4 glucose tablets, or 4-6 oz of regular soda) wait 15 minutes and repeat blood sugar to make sure it comes up above 70.  If your blood sugar is above 70 and you are due for a meal, have a meal.  If you are not due for a meal have a snack.  This snack helps keeps your blood sugar at a safe range.       PRINCIPAL DISCHARGE DIAGNOSIS  Diagnosis: Sepsis secondary to UTI  Assessment and Plan of Treatment: You presented to the hospital with fever and poor oral intake with recent left ureteral stent placement 4/12/24.   urine culture and blood culture grew ESBL EColi.   you were evaluated by Urology team and infectious disease doctor.   you were treated by IV antibiotics   repeat blood cultures on 4/29 have no growth to date   You will need to continue taking IV antibiotics, Ertapenem, until May 13th.   Please continue to take it exactly as prescribed  Extended dwell IV was placed, it may be removed after last dose of Antibiotics.   Follow up with Dr. Evans, urology on May 10th at 8:40AM for follow up and stent removal.         SECONDARY DISCHARGE DIAGNOSES  Diagnosis: Pyelonephritis  Assessment and Plan of Treatment: this is inflammation of  the kidney due to a bacterial infection, most likely due to UTI  Continue to take your IV antibiotics, ertapenem as prescribed until completed.   Follow up with urology and PCP   continue to have plenty of water to remain hydrated   you may have symptoms as list below   -- back pain during urination, blood in urine dysuria , fatigue   If you have severe pain,  fever and bloody urine please reach out to your primary care physician      Diagnosis: DM (diabetes mellitus)  Assessment and Plan of Treatment: HgA1C this admission: 6.6  Make sure you get your HgA1c checked every three months.  If you take oral diabetes medications, check your blood glucose two times a day.  If you take insulin, check your blood glucose before meals and at bedtime.  It's important not to skip any meals.  Keep a log of your blood glucose results and always take it with you to your doctor appointments.  Keep a list of your current medications including injectables and over the counter medications and bring this medication list with you to all your doctor appointments.  If you have not seen your ophthalmologist this year call for appointment.  Check your feet daily for redness, sores, or openings. Do not self treat. If no improvement in two days call your primary care physician for an appointment.  Low blood sugar (hypoglycemia) is a blood sugar below 70mg/dl. Check your blood sugar if you feel signs/symptoms of hypoglycemia. If your blood sugar is below 70 take 15 grams of carbohydrates (ex 4 oz of apple juice, 3-4 glucose tablets, or 4-6 oz of regular soda) wait 15 minutes and repeat blood sugar to make sure it comes up above 70.  If your blood sugar is above 70 and you are due for a meal, have a meal.  If you are not due for a meal have a snack.  This snack helps keeps your blood sugar at a safe range.      Diagnosis: SHAHBAZ (acute kidney injury)  Assessment and Plan of Treatment: Found to have SHAHBAZ (acute kidney injury) with creatinine 2.15 on admission. this is likely due to obstruction.   You were treated with IV fluids and Ahmadi catheter.   Your ahmadi catheter was removed and you were able to urinate freely on your own.   your Creatinine has improved.   Continue to remain hydrated   Follow up with your PCP and Urologist for further evaluation and treatment plan.   Remain Hydrated   Avoid taking (NSAIDs) - (ex: Ibuprofen, Advil, Celebrex, Naprosyn)  Avoid taking any nephrotoxic agents (can harm kidneys) - Intravenous contrast for diagnostic testing, combination cold medications.  Have all medications adjusted for your renal function by your Health Care Provider.  Blood pressure control is important.  Take all medication as prescribed.    Diagnosis: Elevated LFTs  Assessment and Plan of Treatment: Noted with Elevated LFTs.   Likely from severe sepsis   treated with IV fluids and Statin was held   Follow up with PCP for monitoring

## 2024-04-30 NOTE — PROGRESS NOTE ADULT - PROBLEM SELECTOR PLAN 4
- Downtrended   - P/w SCr 2.15 on admission  - Likely 2/2 obstruction   - C/w Ahmadi.  Pt will likely dc w/ ahmadi for OP Uro f/u with Dr. Evans for stent management   - S/p IVF  - Continue to monitor

## 2024-04-30 NOTE — PROGRESS NOTE ADULT - SUBJECTIVE AND OBJECTIVE BOX
Subjective  afebrile overnight. feels well     Objective    Vital signs  T(F): , Max: 100.6 (04-28-24 @ 20:15)  HR: 95 (04-29-24 @ 05:03)  BP: 112/71 (04-29-24 @ 05:03)  SpO2: 96% (04-29-24 @ 05:03)  Wt(kg): --    Output     OUT:    Indwelling Catheter - Urethral (mL): 3300 mL  Total OUT: 3300 mL    Total NET: -3300 mL          Gen: NAD  Abd: soft, nontender, nondistended  : ahmadi secured in place, draining CYU    Labs      04-29 @ 06:00    WBC 17.49 / Hct 34.5  / SCr 1.44     04-28 @ 05:50    WBC 24.76 / Hct 33.8  / SCr 1.52         Culture - Blood (collected 04-26-24 @ 22:30)  Source: .Blood Blood-Peripheral  Gram Stain (04-27-24 @ 14:14):    Growth in aerobic bottle: Gram Negative Rods  Final Report (04-29-24 @ 09:00):    Growth in aerobic bottle: Escherichia coli ESBL    Direct identification is available within approximately 3-5    hours either by Blood Panel Multiplexed PCR or Direct    MALDI-TOF. Details: [email or web address]  Organism: Blood Culture PCR  Escherichia coli ESBL (04-29-24 @ 09:00)  Organism: Escherichia coli ESBL (04-29-24 @ 09:00)      Method Type: CHANDA      -  Ampicillin: R >16 These ampicillin results predict results for amoxicillin      -  Ampicillin/Sulbactam: R 8/4      -  Aztreonam: R 16      -  Cefazolin: R >16      -  Cefepime: R >16      -  Ceftriaxone: R >32      -  Ciprofloxacin: R >2      -  Ertapenem: S <=0.5      -  Gentamicin: S <=2      -  Imipenem: S <=1      -  Levofloxacin: R >4      -  Meropenem: S <=1      -  Piperacillin/Tazobactam: R <=8      -  Tobramycin: S <=2      -  Trimethoprim/Sulfamethoxazole: R >2/38  Organism: Blood Culture PCR (04-29-24 @ 09:00)      Method Type: PCR      -  Escherichia coli: Detec      -  ESBL: Detec      -  CTX-M Resistance Marker: Detec    Culture - Blood (collected 04-26-24 @ 22:15)  Source: .Blood Blood-Peripheral  Gram Stain (04-27-24 @ 14:52):    Growth in aerobic bottle: Gram Negative Rods    Growth in anaerobic bottle: Gram Negative Rods  Final Report (04-29-24 @ 08:59):    Growth in aerobic and anaerobic bottles: Escherichia coli ESBL    See previous culture [id number]    Direct identification is available within approximately 3-5    hours either by Blood Panel Multiplexed PCR or Direct    MALDI-TOF. Details: [email or web address]        Urine Cx: ?  Blood Cx: ?    Imaging

## 2024-04-30 NOTE — DISCHARGE NOTE PROVIDER - NSDCFUSCHEDAPPT_GEN_ALL_CORE_FT
[facility]  UROLOGY [address]  Scheduled Appointment: 05/02/2024    Edmund Evans  [facility]  UROLOGY [address]  Scheduled Appointment: 05/02/2024     [facility]  UROLOGY [address]  Scheduled Appointment: 05/10/2024

## 2024-05-01 DIAGNOSIS — R79.89 OTHER SPECIFIED ABNORMAL FINDINGS OF BLOOD CHEMISTRY: ICD-10-CM

## 2024-05-01 DIAGNOSIS — E78.5 HYPERLIPIDEMIA, UNSPECIFIED: ICD-10-CM

## 2024-05-01 DIAGNOSIS — Z75.8 OTHER PROBLEMS RELATED TO MEDICAL FACILITIES AND OTHER HEALTH CARE: ICD-10-CM

## 2024-05-01 DIAGNOSIS — N39.0 URINARY TRACT INFECTION, SITE NOT SPECIFIED: ICD-10-CM

## 2024-05-01 LAB
-  AMOXICILLIN/CLAVULANIC ACID: SIGNIFICANT CHANGE UP
-  AMPICILLIN/SULBACTAM: SIGNIFICANT CHANGE UP
-  AMPICILLIN: SIGNIFICANT CHANGE UP
-  AZTREONAM: SIGNIFICANT CHANGE UP
-  CEFAZOLIN: SIGNIFICANT CHANGE UP
-  CEFEPIME: SIGNIFICANT CHANGE UP
-  CEFTRIAXONE: SIGNIFICANT CHANGE UP
-  CEFUROXIME: SIGNIFICANT CHANGE UP
-  CIPROFLOXACIN: SIGNIFICANT CHANGE UP
-  ERTAPENEM: SIGNIFICANT CHANGE UP
-  GENTAMICIN: SIGNIFICANT CHANGE UP
-  IMIPENEM: SIGNIFICANT CHANGE UP
-  LEVOFLOXACIN: SIGNIFICANT CHANGE UP
-  MEROPENEM: SIGNIFICANT CHANGE UP
-  NITROFURANTOIN: SIGNIFICANT CHANGE UP
-  PIPERACILLIN/TAZOBACTAM: SIGNIFICANT CHANGE UP
-  TOBRAMYCIN: SIGNIFICANT CHANGE UP
-  TRIMETHOPRIM/SULFAMETHOXAZOLE: SIGNIFICANT CHANGE UP
ALBUMIN SERPL ELPH-MCNC: 2.3 G/DL — LOW (ref 3.5–5)
ALP SERPL-CCNC: 155 U/L — HIGH (ref 40–120)
ALT FLD-CCNC: 49 U/L DA — SIGNIFICANT CHANGE UP (ref 10–60)
ANION GAP SERPL CALC-SCNC: 9 MMOL/L — SIGNIFICANT CHANGE UP (ref 5–17)
AST SERPL-CCNC: 45 U/L — HIGH (ref 10–40)
BILIRUB SERPL-MCNC: 0.7 MG/DL — SIGNIFICANT CHANGE UP (ref 0.2–1.2)
BUN SERPL-MCNC: 18 MG/DL — SIGNIFICANT CHANGE UP (ref 7–18)
CALCIUM SERPL-MCNC: 8.4 MG/DL — SIGNIFICANT CHANGE UP (ref 8.4–10.5)
CHLORIDE SERPL-SCNC: 105 MMOL/L — SIGNIFICANT CHANGE UP (ref 96–108)
CO2 SERPL-SCNC: 21 MMOL/L — LOW (ref 22–31)
CREAT SERPL-MCNC: 1.59 MG/DL — HIGH (ref 0.5–1.3)
CRP SERPL-MCNC: 47 MG/L — HIGH
CULTURE RESULTS: ABNORMAL
EGFR: 47 ML/MIN/1.73M2 — LOW
ERYTHROCYTE [SEDIMENTATION RATE] IN BLOOD: 88 MM/HR — HIGH (ref 0–20)
GLUCOSE BLDC GLUCOMTR-MCNC: 113 MG/DL — HIGH (ref 70–99)
GLUCOSE BLDC GLUCOMTR-MCNC: 125 MG/DL — HIGH (ref 70–99)
GLUCOSE BLDC GLUCOMTR-MCNC: 132 MG/DL — HIGH (ref 70–99)
GLUCOSE BLDC GLUCOMTR-MCNC: 203 MG/DL — HIGH (ref 70–99)
GLUCOSE SERPL-MCNC: 145 MG/DL — HIGH (ref 70–99)
HCT VFR BLD CALC: 36.5 % — LOW (ref 39–50)
HGB BLD-MCNC: 12.1 G/DL — LOW (ref 13–17)
MCHC RBC-ENTMCNC: 28.1 PG — SIGNIFICANT CHANGE UP (ref 27–34)
MCHC RBC-ENTMCNC: 33.2 GM/DL — SIGNIFICANT CHANGE UP (ref 32–36)
MCV RBC AUTO: 84.9 FL — SIGNIFICANT CHANGE UP (ref 80–100)
METHOD TYPE: SIGNIFICANT CHANGE UP
NRBC # BLD: 0 /100 WBCS — SIGNIFICANT CHANGE UP (ref 0–0)
ORGANISM # SPEC MICROSCOPIC CNT: ABNORMAL
ORGANISM # SPEC MICROSCOPIC CNT: ABNORMAL
PLATELET # BLD AUTO: 480 K/UL — HIGH (ref 150–400)
POTASSIUM SERPL-MCNC: 3.9 MMOL/L — SIGNIFICANT CHANGE UP (ref 3.5–5.3)
POTASSIUM SERPL-SCNC: 3.9 MMOL/L — SIGNIFICANT CHANGE UP (ref 3.5–5.3)
PROT SERPL-MCNC: 7.5 G/DL — SIGNIFICANT CHANGE UP (ref 6–8.3)
RBC # BLD: 4.3 M/UL — SIGNIFICANT CHANGE UP (ref 4.2–5.8)
RBC # FLD: 14.8 % — HIGH (ref 10.3–14.5)
SODIUM SERPL-SCNC: 135 MMOL/L — SIGNIFICANT CHANGE UP (ref 135–145)
SPECIMEN SOURCE: SIGNIFICANT CHANGE UP
WBC # BLD: 12.34 K/UL — HIGH (ref 3.8–10.5)
WBC # FLD AUTO: 12.34 K/UL — HIGH (ref 3.8–10.5)

## 2024-05-01 PROCEDURE — 99233 SBSQ HOSP IP/OBS HIGH 50: CPT

## 2024-05-01 PROCEDURE — 99222 1ST HOSP IP/OBS MODERATE 55: CPT

## 2024-05-01 RX ADMIN — HEPARIN SODIUM 5000 UNIT(S): 5000 INJECTION INTRAVENOUS; SUBCUTANEOUS at 05:23

## 2024-05-01 RX ADMIN — Medication 3 MILLIGRAM(S): at 21:06

## 2024-05-01 RX ADMIN — HEPARIN SODIUM 5000 UNIT(S): 5000 INJECTION INTRAVENOUS; SUBCUTANEOUS at 13:58

## 2024-05-01 RX ADMIN — HEPARIN SODIUM 5000 UNIT(S): 5000 INJECTION INTRAVENOUS; SUBCUTANEOUS at 21:05

## 2024-05-01 RX ADMIN — Medication 2: at 12:06

## 2024-05-01 RX ADMIN — ERTAPENEM SODIUM 120 MILLIGRAM(S): 1 INJECTION, POWDER, LYOPHILIZED, FOR SOLUTION INTRAMUSCULAR; INTRAVENOUS at 12:07

## 2024-05-01 RX ADMIN — TAMSULOSIN HYDROCHLORIDE 0.8 MILLIGRAM(S): 0.4 CAPSULE ORAL at 21:05

## 2024-05-01 NOTE — CONSULT NOTE ADULT - ATTENDING SUPERVISION STATEMENT
Resident Referred To Otolaryngology For Closure Text (Leave Blank If You Do Not Want): After obtaining clear surgical margins the patient was sent to otolaryngology for surgical repair.  The patient understands they will receive post-surgical care and follow-up from the referring physician's office.

## 2024-05-01 NOTE — CONSULT NOTE ADULT - TIME BILLING
- Review of records, vital signs and daily labs, Imaging, microbiology and other Infectious Diseases related work up  - General physical examination performed  - Generation of Infectious Diseases treatment plan discussed with attending Physician  - Coordination of care with the multidisciplinary team  -Counseling of the patient and/or family members

## 2024-05-01 NOTE — PROGRESS NOTE ADULT - PROBLEM SELECTOR PLAN 1
- P/w presenting with fevers, chills, left flank pain.    - Met sepsis on admission: Temp 98.2, HR 97, BP 95/64, RR 18, 98% on RA, WBC 23K  - ct a/p- suggestive of pyelonephritis.  - OP UCx (+) ESBL, sensitive to Ertapenem  - S/p 1L IVF in ED  - plan as above - Ucx-  Esbl e.coli  - 4/26 (+) Bld cx-ESBL E. Coli   - 4/29 Repeat Bld cx- NGTD  - c/w ertapenem ( need total 14 day course )    - Id Dr. [facility]:       Ertapenem 1g q24 hrs for 14 days after neg blood cultures (until 5/13)       Please obtain Urology evaluation for stent removal (placed 21 days ago)        Monitor renal function       Surveillance labs CBC w/diff, CMP, ESR and CRP

## 2024-05-01 NOTE — PROGRESS NOTE ADULT - PROBLEM SELECTOR PLAN 4
- Downtrended   - P/w SCr 2.15 on admission  - Likely 2/2 obstruction   - C/w Amhadi.  Pt will likely dc w/ ahmadi for OP Uro f/u with Dr. Evans for stent management   - S/p IVF  - Continue to monitor - P/w SCr 2.15 on admission  - Likely 2/2 obstruction   - S/p IVF  - C/w Ahmadi.  urology rec:c dc w/ ahmadi for OP Uro f/u with Dr. Evans for stent management   - Continue to monitor Scr.

## 2024-05-01 NOTE — CONSULT NOTE ADULT - ATTENDING COMMENTS
68M PMH DM presenting to the ED due to fevers and chills. Pt states that he recently had a L ureteroscopy with stent placement on 4/12 with Dr. Evans. Admitted for acute pyelo due to ESBL organisms.   CT Abdomen and Pelvis No Cont (04.27.24 @ 02:28) There is left-sided nephroureteral stent with increased left-sided perinephric   stranding . BL 3 mm non obstructive renal stones. Indeterminate 2 cm left lower pole lesion , no hydronephrosis seen on previous CT A/P 4/10.    Blood cx 4/26 + ESBL E coli  Urine cx + ESBL E coli  ID consulted for abx guidance, pt is afebrile and feeling much better, non toxic, WBC trending down, blood cultures from 4/29 NGTD.      -Sepsis  -Acute complicated UTI due to ESBL organisms   -BSI- ESBL E coli source   -Obstructive uropathy   -Renal stones  -DM    Ertapenem 1g q24 hrs for 14 days after neg blood cultures (until 5/13)  Please obtain Urology evaluation for stent removal (placed 21 days ago)   Monitor renal function  Surveillance labs CBC w/diff, CMP, ESR and CRP   Discussed with Dr. Russell

## 2024-05-01 NOTE — CONSULT NOTE ADULT - ASSESSMENT
THIS NOTE IS INCOMPLETE TILL SIGNED BY ATTENDING  THIS NOTE IS INCOMPLETE TILL SIGNED BY ATTENDING       68 year old male patient with pmhx DM, HLD, BPH, kidney stones, recent left ureteral stent placement 4/12/24 who presented to the ER due to persistent intermittent fever and chills despite outpatient antibiotics- Ciprofloxacin ( 4/18-4/23), found to have ESBL on UCx switched to Augmentin ( 4/23- 4/27). Patient was found to be septic in ER with elevated WBC, Temp 102 F, ), 2/2 ESBL UTI ( confirmed by UCx on 4/27), started on Ertapenem ( 4/27) for ESBL bacteremia ( BCx + 4/26), repeat BCx ( 4/29) negative. 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  Urology was consulted, stent  to remain intact, no further surgical intervention. Infectious disease was consulted for further antibiotic management.         Dx     # sepsis 2/2 UTI- ESBL  # ESBL bacteremia   #Complicated UTI-  L emphysematous pyelonephritis, emphysematous cystitis, s/p L ureteral stent placement 4/12  # DM     PLAN -                68 year old male patient with pmhx DM, HLD, BPH, kidney stones, recent left ureteral stent placement 4/12/24 who presented to the ER due to persistent intermittent fever and chills despite outpatient antibiotics- Ciprofloxacin ( 4/18-4/23), found to have ESBL on UCx switched to Augmentin ( 4/23- 4/27). Patient was found to be septic in ER with elevated WBC, Temp 102 F, ), 2/2 ESBL UTI ( confirmed by UCx on 4/27), started on Ertapenem ( 4/27) for ESBL bacteremia ( BCx + 4/26), repeat BCx ( 4/29) negative. 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  Urology was consulted, stent  to remain intact, no further surgical intervention. Infectious disease was consulted for further antibiotic management.   WBC 50k ( admission) > 12k   Lactate elevated on admission> normalized     Dx     # sepsis 2/2 UTI- ESBL  # ESBL bacteremia   #Complicated UTI-  L emphysematous pyelonephritis, emphysematous cystitis, s/p L ureteral stent placement 4/12  # DM     PLAN -     -- Source control with stent removal  -- Urology evaluation for stent removal   -- c/w Ertapenem 1 g Q 24 h ( Day 5 )  -- Obtain daily ESR, CRP  -- f/u all culture sensitivities     Thank you for this consult, Infectious disease will continue  to follow.   Recommendations discussed with IM attending - Dr. Russell       THIS NOTE IS INCOMPLETE TILL SIGNED BY ATTENDING            68 year old male patient with pmhx DM, HLD, BPH, kidney stones, recent left ureteral stent placement 4/12/24 who presented to the ER due to persistent intermittent fever and chills despite outpatient antibiotics- Ciprofloxacin ( 4/18-4/23), found to have ESBL on UCx switched to Augmentin ( 4/23- 4/27). Patient was found to be septic in ER with elevated WBC, Temp 102 F, ), 2/2 ESBL UTI ( confirmed by UCx on 4/27), started on Ertapenem ( 4/27) for ESBL bacteremia ( BCx + 4/26), repeat BCx ( 4/29) negative. 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  Urology was consulted, stent  to remain intact, no further surgical intervention. Infectious disease was consulted for further antibiotic management.   WBC 50k ( admission) > 12k   Lactate elevated on admission> normalized     Dx     # sepsis 2/2 UTI- ESBL  # ESBL bacteremia   #Complicated UTI-  L emphysematous pyelonephritis, emphysematous cystitis, s/p L ureteral stent placement 4/12  # DM     PLAN -   -- c/w Ertapenem 1 g Q 24 h ( Day 5 )  -- Urology evaluation for stent removal   -- Obtain daily ESR, CRP  -- f/u all culture sensitivities     Thank you for this consult, Infectious disease will continue  to follow.   Recommendations discussed with IM attending - Dr. Russell       THIS NOTE IS INCOMPLETE TILL SIGNED BY ATTENDING

## 2024-05-01 NOTE — PROGRESS NOTE ADULT - SUBJECTIVE AND OBJECTIVE BOX
Subjective  afebrile overnight. feels well     Objective    Vital signs  T(F): , Max: 100.6 (04-28-24 @ 20:15)  HR: 95 (04-29-24 @ 05:03)  BP: 112/71 (04-29-24 @ 05:03)  SpO2: 96% (04-29-24 @ 05:03)  Wt(kg): --    Output     OUT:    Indwelling Catheter - Urethral (mL): 3300 mL  Total OUT: 3300 mL    Total NET: -3300 mL          Gen: NAD  Abd: soft, nontender, nondistended  : ahmadi secured in place, draining CYU    Labs      04-29 @ 06:00    WBC 17.49 / Hct 34.5  / SCr 1.44     04-28 @ 05:50    WBC 24.76 / Hct 33.8  / SCr 1.52         Culture - Blood (collected 04-26-24 @ 22:30)  Source: .Blood Blood-Peripheral  Gram Stain (04-27-24 @ 14:14):    Growth in aerobic bottle: Gram Negative Rods  Final Report (04-29-24 @ 09:00):    Growth in aerobic bottle: Escherichia coli ESBL    Direct identification is available within approximately 3-5    hours either by Blood Panel Multiplexed PCR or Direct    MALDI-TOF. Details: [email or web address]  Organism: Blood Culture PCR  Escherichia coli ESBL (04-29-24 @ 09:00)  Organism: Escherichia coli ESBL (04-29-24 @ 09:00)      Method Type: CHANDA      -  Ampicillin: R >16 These ampicillin results predict results for amoxicillin      -  Ampicillin/Sulbactam: R 8/4      -  Aztreonam: R 16      -  Cefazolin: R >16      -  Cefepime: R >16      -  Ceftriaxone: R >32      -  Ciprofloxacin: R >2      -  Ertapenem: S <=0.5      -  Gentamicin: S <=2      -  Imipenem: S <=1      -  Levofloxacin: R >4      -  Meropenem: S <=1      -  Piperacillin/Tazobactam: R <=8      -  Tobramycin: S <=2      -  Trimethoprim/Sulfamethoxazole: R >2/38  Organism: Blood Culture PCR (04-29-24 @ 09:00)      Method Type: PCR      -  Escherichia coli: Detec      -  ESBL: Detec      -  CTX-M Resistance Marker: Detec    Culture - Blood (collected 04-26-24 @ 22:15)  Source: .Blood Blood-Peripheral  Gram Stain (04-27-24 @ 14:52):    Growth in aerobic bottle: Gram Negative Rods    Growth in anaerobic bottle: Gram Negative Rods  Final Report (04-29-24 @ 08:59):    Growth in aerobic and anaerobic bottles: Escherichia coli ESBL    See previous culture [id number]    Direct identification is available within approximately 3-5    hours either by Blood Panel Multiplexed PCR or Direct    MALDI-TOF. Details: [email or web address]        Urine Cx: ?  Blood Cx: ?    Imaging

## 2024-05-01 NOTE — PROGRESS NOTE ADULT - PROBLEM SELECTOR PLAN 9
dispo home v snf for IV abx ( needs 14 days total per ID)    ext dwell to be placed 5/1/24 for abx until 5/13/24 dispo home v snf for IV abx ( needs 14 days total per ID)

## 2024-05-01 NOTE — CONSULT NOTE ADULT - SUBJECTIVE AND OBJECTIVE BOX
HPI:  68M PMH DM presenting to the ED due to fevers and chills. Pt states that he recently had a L ureteroscopy with stent placement on 4/12 with Dr. Evans.  After he was discharged has having intermittent fevers. He had a follow up appointment with Dr. Evans on 4/18 and was empirically started on cipro. His outpatient urine cultures were positive for ESBL and his anitbiotics were switched to augmentin on 4/23. 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 Last night, he had another fever of 102F and continued having left flank pain, nonradiating, sharp, and intermittent. His daughter also reports that he has been having decreased appetite. While in the ED he was experiencing urinary frequency and had one episode of diarrhea. He denies CP, SOB, vomiting, abdominal pain, dysuria, numbness/tingling/weakness in extremities.  (27 Apr 2024 05:08)      PAST MEDICAL & SURGICAL HISTORY:      MEDS:  acetaminophen     Tablet .. 650 milliGRAM(s) Oral every 6 hours PRN  aluminum hydroxide/magnesium hydroxide/simethicone Suspension 30 milliLiter(s) Oral every 4 hours PRN  ertapenem  IVPB 1000 milliGRAM(s) IV Intermittent every 24 hours  ertapenem  IVPB      heparin   Injectable 5000 Unit(s) SubCutaneous every 8 hours  insulin lispro (ADMELOG) corrective regimen sliding scale   SubCutaneous three times a day before meals  insulin lispro (ADMELOG) corrective regimen sliding scale   SubCutaneous at bedtime  melatonin 3 milliGRAM(s) Oral at bedtime PRN  ondansetron Injectable 4 milliGRAM(s) IV Push every 8 hours PRN  sodium chloride 0.9%. 1000 milliLiter(s) IV Continuous <Continuous>  tamsulosin 0.8 milliGRAM(s) Oral at bedtime      ALLERGIES  No Known Allergies      SOCIAL HISTORY:    FAMILY HISTORY:      ROS:    General:	    Skin:  	  HEENT:    Respiratory and Thorax:  	  Cardiovascular:	    Abdomen:	    Genitourinary:	    Musculoskeletal:	    Neurological:	    Psychiatric:	    Hematology/Lymphatics:	    Endocrine:	    PHYSICAL EXAM:    Vital Signs Last 24 Hrs  T(C): 36.9 (01 May 2024 05:37), Max: 37.2 (30 Apr 2024 12:22)  T(F): 98.5 (01 May 2024 05:37), Max: 98.9 (30 Apr 2024 12:22)  HR: 98 (01 May 2024 06:01) (94 - 107)  BP: 102/70 (01 May 2024 06:01) (100/68 - 126/61)  BP(mean): --  RR: 17 (01 May 2024 05:37) (16 - 17)  SpO2: 96% (01 May 2024 05:37) (96% - 97%)    Parameters below as of 01 May 2024 05:37  Patient On (Oxygen Delivery Method): room air          Gen:    HEENT:    Neck:    Lymph Nodes:    Breasts:    Back:    Chest/Thorax:    Cardiovascular:    Gastrointestinal:    Genitourinary:    Rectal:    Extremities:    Vascular:    Neurological:    Skin:    Psychiatric:    LABS/DIAGNOSTIC TESTS:                          12.1   12.34 )-----------( 480      ( 01 May 2024 05:50 )             36.5     WBC Count: 12.34 K/uL (05-01 @ 05:50)  WBC Count: 12.24 K/uL (04-30 @ 06:05)  WBC Count: 17.49 K/uL (04-29 @ 06:00)      05-01    135  |  105  |  18  ----------------------------<  145<H>  3.9   |  21<L>  |  1.59<H>    Ca    8.4      01 May 2024 05:50  Phos  3.0     04-30  Mg     2.1     04-30    TPro  7.5  /  Alb  2.3<L>  /  TBili  0.7  /  DBili  x   /  AST  45<H>  /  ALT  49  /  AlkPhos  155<H>  05-01      Urinalysis Basic - ( 01 May 2024 05:50 )    Color: x / Appearance: x / SG: x / pH: x  Gluc: 145 mg/dL / Ketone: x  / Bili: x / Urobili: x   Blood: x / Protein: x / Nitrite: x   Leuk Esterase: x / RBC: x / WBC x   Sq Epi: x / Non Sq Epi: x / Bacteria: x        LIVER FUNCTIONS - ( 01 May 2024 05:50 )  Alb: 2.3 g/dL / Pro: 7.5 g/dL / ALK PHOS: 155 U/L / ALT: 49 U/L DA / AST: 45 U/L / GGT: x                 LACTATE:    ABG -     CULTURES:     Culture - Blood (collected 04-29-24 @ 10:55)  Source: .Blood Blood  Preliminary Report (04-30-24 @ 18:02):    No growth at 24 hours    Culture - Blood (collected 04-29-24 @ 10:47)  Source: .Blood Blood  Preliminary Report (04-30-24 @ 18:02):    No growth at 24 hours    Culture - Urine (collected 04-27-24 @ 08:57)  Source: Clean Catch Clean Catch (Midstream)  Final Report (05-01-24 @ 09:46):    50,000 - 99,000 CFU/mL Escherichia coli ESBL  Organism: Escherichia coli ESBL (05-01-24 @ 09:46)  Organism: Escherichia coli ESBL (05-01-24 @ 09:46)      Method Type: CHANDA      -  Amoxicillin/Clavulanic Acid: S <=8/4      -  Ampicillin: R >16 These ampicillin results predict results for amoxicillin      -  Ampicillin/Sulbactam: S <=4/2      -  Aztreonam: R 16      -  Cefazolin: R >16 For uncomplicated UTI with K. pneumoniae, E. coli, or P. mirablis: CHANDA <=16 is sensitive and CHANDA >=32 is resistant. This also predicts results for oral agents cefaclor, cefdinir, cefpodoxime, cefprozil, cefuroxime axetil, cephalexin and locarbef for uncomplicated UTI. Note that some isolates may be susceptible to these agents while testing resistant to cefazolin.      -  Cefepime: R >16      -  Ceftriaxone: R >32      -  Cefuroxime: R >16      -  Ciprofloxacin: R >2      -  Ertapenem: S <=0.5      -  Gentamicin: S <=2      -  Imipenem: S <=1      -  Levofloxacin: R >4      -  Meropenem: S <=1      -  Nitrofurantoin: S <=32 Should not be used to treat pyelonephritis      -  Piperacillin/Tazobactam: S <=8      -  Tobramycin: S <=2      -  Trimethoprim/Sulfamethoxazole: R >2/38    Culture - Blood (collected 04-26-24 @ 22:30)  Source: .Blood Blood-Peripheral  Gram Stain (04-27-24 @ 14:14):    Growth in aerobic bottle: Gram Negative Rods  Final Report (04-29-24 @ 09:00):    Growth in aerobic bottle: Escherichia coli ESBL    Direct identification is available within approximately 3-5    hours either by Blood Panel Multiplexed PCR or Direct    MALDI-TOF. Details: [email or web address]  Organism: Blood Culture PCR  Escherichia coli ESBL (04-29-24 @ 09:00)  Organism: Escherichia coli ESBL (04-29-24 @ 09:00)      Method Type: CHANDA      -  Ampicillin: R >16 These ampicillin results predict results for amoxicillin      -  Ampicillin/Sulbactam: R 8/4      -  Aztreonam: R 16      -  Cefazolin: R >16      -  Cefepime: R >16      -  Ceftriaxone: R >32      -  Ciprofloxacin: R >2      -  Ertapenem: S <=0.5      -  Gentamicin: S <=2      -  Imipenem: S <=1      -  Levofloxacin: R >4      -  Meropenem: S <=1      -  Piperacillin/Tazobactam: R <=8      -  Tobramycin: S <=2      -  Trimethoprim/Sulfamethoxazole: R >2/38  Organism: Blood Culture PCR (04-29-24 @ 09:00)      Method Type: PCR      -  Escherichia coli: Detec      -  ESBL: Detec      -  CTX-M Resistance Marker: Detec    Culture - Blood (collected 04-26-24 @ 22:15)  Source: .Blood Blood-Peripheral  Gram Stain (04-27-24 @ 14:52):    Growth in aerobic bottle: Gram Negative Rods    Growth in anaerobic bottle: Gram Negative Rods  Final Report (04-29-24 @ 08:59):    Growth in aerobic and anaerobic bottles: Escherichia coli ESBL    See previous culture [id number]    Direct identification is available within approximately 3-5    hours either by Blood Panel Multiplexed PCR or Direct    MALDI-TOF. Details: [email or web address]    Culture - Urine (collected 04-10-24 @ 01:02)  Source: Clean Catch Clean Catch (Midstream)  Final Report (04-11-24 @ 09:47):    <10,000 CFU/mL Normal Urogenital Maddison        RADIOLOGY               HPI:  68M PMH DM presenting to the ED due to fevers and chills. Pt states that he recently had a L ureteroscopy with stent placement on 4/12 with Dr. Evans.  After he was discharged has having intermittent fevers. He had a follow up appointment with Dr. Evans on 4/18 and was empirically started on cipro. His outpatient urine cultures were positive for ESBL and his anitbiotics were switched to augmentin on 4/23. 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 Last night, he had another fever of 102F and continued having left flank pain, nonradiating, sharp, and intermittent. His daughter also reports that he has been having decreased appetite. While in the ED he was experiencing urinary frequency and had one episode of diarrhea. He denies CP, SOB, vomiting, abdominal pain, dysuria, numbness/tingling/weakness in extremities.  (27 Apr 2024 05:08)  Additional history obtained at the time of this consult-  Patient and daughter ( Ms Ailin Pereira - bedside providing translation) confirmed above history.   No acute overnight events. Patient denies fever, chills, abdominal pain, constipation, diarrhea, SOB, cough, chest pain.   Endorses positional discomfort and mild pain at site of ahmadi catheter insertion.  Patient denies recent travel and sick contacts.     PAST MEDICAL & SURGICAL HISTORY:      MEDS:  acetaminophen     Tablet .. 650 milliGRAM(s) Oral every 6 hours PRN  aluminum hydroxide/magnesium hydroxide/simethicone Suspension 30 milliLiter(s) Oral every 4 hours PRN  ertapenem  IVPB 1000 milliGRAM(s) IV Intermittent every 24 hours  ertapenem  IVPB      heparin   Injectable 5000 Unit(s) SubCutaneous every 8 hours  insulin lispro (ADMELOG) corrective regimen sliding scale   SubCutaneous three times a day before meals  insulin lispro (ADMELOG) corrective regimen sliding scale   SubCutaneous at bedtime  melatonin 3 milliGRAM(s) Oral at bedtime PRN  ondansetron Injectable 4 milliGRAM(s) IV Push every 8 hours PRN  sodium chloride 0.9%. 1000 milliLiter(s) IV Continuous <Continuous>  tamsulosin 0.8 milliGRAM(s) Oral at bedtime      ALLERGIES  No Known Allergies      SOCIAL HISTORY: Patient denies smoking, endorses social alcohol consumption ( once / month)     FAMILY HISTORY: No pertinent family history in first degree relatives       ROS:    General: Denies fever and chills	    Skin: Denies rash and pruritus.  	  HEENT: Denies changes in vision and hearing.    Respiratory and Thorax: Denies SOB and cough.  	  Cardiovascular:	Denies chest pain and palpitations    Abdomen:	Denies abdominal pain, nausea, vomiting and diarrhea.    Genitourinary:	+ Mild pain/ discomfort at site of catheter insertion     Musculoskeletal:	 denies myalgia/ arthralgia     Neurological:	Denies headache and syncope.    Psychiatric:	Denies recent changes in mood. Denies anxiety and depression.    Hematology/Lymphatics:	 Denies bleeding / bruising    Endocrine:	Denies any recent weight fluctuations     PHYSICAL EXAM:    Vital Signs Last 24 Hrs  T(C): 36.9 (01 May 2024 05:37), Max: 37.2 (30 Apr 2024 12:22)  T(F): 98.5 (01 May 2024 05:37), Max: 98.9 (30 Apr 2024 12:22)  HR: 98 (01 May 2024 06:01) (94 - 107)  BP: 102/70 (01 May 2024 06:01) (100/68 - 126/61)  BP(mean): --  RR: 17 (01 May 2024 05:37) (16 - 17)  SpO2: 96% (01 May 2024 05:37) (96% - 97%)    Parameters below as of 01 May 2024 05:37  Patient On (Oxygen Delivery Method): room air      Gen: NAD, speaks in full sentences, no signs of respiratory distress    HEENT: Atraumatic, Normocephalic, EOMI, PERRLA, conjunctiva and sclera clear    Neck: Supple, No JVD    Lymph Nodes: No palpable cervical LN     Back: No palpable spinal tenderness     Chest/Thorax: Clear to auscultation bilaterally; No wheeze; No crackles; No accessory muscles used    Cardiovascular: Regular rate and rhythm; No murmurs;     Gastrointestinal: Soft, Nontender, Nondistended; Bowel sounds present; No guarding     Genitourinary: No CVA or suprapubic tenderness, Ahmadi catheter, draining normal yellow urine     Extremities: 2+ Peripheral Pulses, No cyanosis or edema    Vascular: BL palpable DP pulses     Neurological: No focal sensory / motor deficits, A+Ox 3     Skin: No rashes or lesions    Psychiatric: Normal mood and affect     LABS/DIAGNOSTIC TESTS:                          12.1   12.34 )-----------( 480      ( 01 May 2024 05:50 )             36.5     WBC Count: 12.34 K/uL (05-01 @ 05:50)  WBC Count: 12.24 K/uL (04-30 @ 06:05)  WBC Count: 17.49 K/uL (04-29 @ 06:00)      05-01    135  |  105  |  18  ----------------------------<  145<H>  3.9   |  21<L>  |  1.59<H>    Ca    8.4      01 May 2024 05:50  Phos  3.0     04-30  Mg     2.1     04-30    TPro  7.5  /  Alb  2.3<L>  /  TBili  0.7  /  DBili  x   /  AST  45<H>  /  ALT  49  /  AlkPhos  155<H>  05-01      Urinalysis Basic - ( 01 May 2024 05:50 )    Color: x / Appearance: x / SG: x / pH: x  Gluc: 145 mg/dL / Ketone: x  / Bili: x / Urobili: x   Blood: x / Protein: x / Nitrite: x   Leuk Esterase: x / RBC: x / WBC x   Sq Epi: x / Non Sq Epi: x / Bacteria: x        LIVER FUNCTIONS - ( 01 May 2024 05:50 )  Alb: 2.3 g/dL / Pro: 7.5 g/dL / ALK PHOS: 155 U/L / ALT: 49 U/L DA / AST: 45 U/L / GGT: x                 LACTATE:    ABG -     CULTURES:     Culture - Blood (collected 04-29-24 @ 10:55)  Source: .Blood Blood  Preliminary Report (04-30-24 @ 18:02):    No growth at 24 hours    Culture - Blood (collected 04-29-24 @ 10:47)  Source: .Blood Blood  Preliminary Report (04-30-24 @ 18:02):    No growth at 24 hours    Culture - Urine (collected 04-27-24 @ 08:57)  Source: Clean Catch Clean Catch (Midstream)  Final Report (05-01-24 @ 09:46):    50,000 - 99,000 CFU/mL Escherichia coli ESBL  Organism: Escherichia coli ESBL (05-01-24 @ 09:46)  Organism: Escherichia coli ESBL (05-01-24 @ 09:46)      Method Type: CHANDA      -  Amoxicillin/Clavulanic Acid: S <=8/4      -  Ampicillin: R >16 These ampicillin results predict results for amoxicillin      -  Ampicillin/Sulbactam: S <=4/2      -  Aztreonam: R 16      -  Cefazolin: R >16 For uncomplicated UTI with K. pneumoniae, E. coli, or P. mirablis: CHANDA <=16 is sensitive and CHANDA >=32 is resistant. This also predicts results for oral agents cefaclor, cefdinir, cefpodoxime, cefprozil, cefuroxime axetil, cephalexin and locarbef for uncomplicated UTI. Note that some isolates may be susceptible to these agents while testing resistant to cefazolin.      -  Cefepime: R >16      -  Ceftriaxone: R >32      -  Cefuroxime: R >16      -  Ciprofloxacin: R >2      -  Ertapenem: S <=0.5      -  Gentamicin: S <=2      -  Imipenem: S <=1      -  Levofloxacin: R >4      -  Meropenem: S <=1      -  Nitrofurantoin: S <=32 Should not be used to treat pyelonephritis      -  Piperacillin/Tazobactam: S <=8      -  Tobramycin: S <=2      -  Trimethoprim/Sulfamethoxazole: R >2/38    Culture - Blood (collected 04-26-24 @ 22:30)  Source: .Blood Blood-Peripheral  Gram Stain (04-27-24 @ 14:14):    Growth in aerobic bottle: Gram Negative Rods  Final Report (04-29-24 @ 09:00):    Growth in aerobic bottle: Escherichia coli ESBL    Direct identification is available within approximately 3-5    hours either by Blood Panel Multiplexed PCR or Direct    MALDI-TOF. Details: [email or web address]  Organism: Blood Culture PCR  Escherichia coli ESBL (04-29-24 @ 09:00)  Organism: Escherichia coli ESBL (04-29-24 @ 09:00)      Method Type: CHANDA      -  Ampicillin: R >16 These ampicillin results predict results for amoxicillin      -  Ampicillin/Sulbactam: R 8/4      -  Aztreonam: R 16      -  Cefazolin: R >16      -  Cefepime: R >16      -  Ceftriaxone: R >32      -  Ciprofloxacin: R >2      -  Ertapenem: S <=0.5      -  Gentamicin: S <=2      -  Imipenem: S <=1      -  Levofloxacin: R >4      -  Meropenem: S <=1      -  Piperacillin/Tazobactam: R <=8      -  Tobramycin: S <=2      -  Trimethoprim/Sulfamethoxazole: R >2/38  Organism: Blood Culture PCR (04-29-24 @ 09:00)      Method Type: PCR      -  Escherichia coli: Detec      -  ESBL: Detec      -  CTX-M Resistance Marker: Detec    Culture - Blood (collected 04-26-24 @ 22:15)  Source: .Blood Blood-Peripheral  Gram Stain (04-27-24 @ 14:52):    Growth in aerobic bottle: Gram Negative Rods    Growth in anaerobic bottle: Gram Negative Rods  Final Report (04-29-24 @ 08:59):    Growth in aerobic and anaerobic bottles: Escherichia coli ESBL    See previous culture [id number]    Direct identification is available within approximately 3-5    hours either by Blood Panel Multiplexed PCR or Direct    MALDI-TOF. Details: [email or web address]    Culture - Urine (collected 04-10-24 @ 01:02)  Source: Clean Catch Clean Catch (Midstream)  Final Report (04-11-24 @ 09:47):    <10,000 CFU/mL Normal Urogenital Maddison        RADIOLOGY       XR CHEST PORTABLE URGENT 1V   ORDERED BY: SHAGUFTA GARCIA     PROCEDURE DATE:  04/26/2024          INTERPRETATION:  History: Sepsis.    TECHNIQUE: Single frontal view of the chest.    COMPARISON: None.    FINDINGS:    The lungs are clear. Cardiac silhouette is normal in size. Osseous   structures are normal.    IMPRESSION: No active pulmonary disease.    CT ABDOMEN AND PELVIS   ORDERED BY: LIO GUNN     PROCEDURE DATE:  04/27/2024          INTERPRETATION:  CLINICAL INFORMATION: Left flank pain and fever.    COMPARISON: 6 4/25/2024.    CONTRAST/COMPLICATIONS:  IV Contrast: None.  Oral Contrast: None.  Complications: None.    PROCEDURE:  CT of the Abdomen and Pelvis was performed.  Sagittal and coronal reformats were performed.    FINDINGS:  LOWER CHEST: Dependent atelectasis is appreciated. Right posterior medial   osteophyte lung is noted.    LIVER: Calcified right hepatic lobe granuloma. There is a lobulated left   hepatic lobe cyst.  BILE DUCTS: Normal caliber.  GALLBLADDER: Within normal limits.  SPLEEN: Within normal limits.  PANCREAS: Motion is seen in the region of the pancreatic head. Otherwise,   within normal limits.  ADRENALS: Within normal limits.  KIDNEYS/URETERS: There is left-sided nephroureteral stent, in   satisfactory position. There is increased left-sided perinephric   stranding when compared to the previous exam. 2 foci of air are seen   within the left upper pole kidney, which appear parenchymal. There are 2   adjacent stones seen within the left upper pole kidney which measure up   to 3 mm. There are at least 2 discrete stones of the right kidney which   measure up to 3 mm. Additional faint punctate stones may be present   bilaterally. There is a right renal cyst. Indeterminate 2 cm left lower   pole lesion is again appreciated. This would be better evaluated with   renal protocol MRI or CT.    BLADDER: Again appreciated is a thick-walled urinary bladder. Bladder   wall emphysema is again noted. There is a tiny focus of extraluminal air   laterally within the left pelvis. This is possibly associated with a   small vessel. Previously identified additional extra luminal foci of air   are no longer visualized. There is a 4 mm bladder stone within the left   posterior bladder. This likely corresponds to previously noted right   ureterovesicular junction stone.  REPRODUCTIVE ORGANS: Prostate gland is markedly enlarged. There is   nodular indentation of the posterior bladder.    BOWEL: No bowel obstruction. Appendix is normal. There are occasional   colonic diverticula. No acute diverticulitis.  PERITONEUM: No ascites.  VESSELS: Within normal limits.  RETROPERITONEUM/LYMPH NODES: No lymphadenopathy.  ABDOMINAL WALL: Within normal limits.  BONES: Degenerative changes.    IMPRESSION:  Increased left-sided perinephric stranding compared to the previous exam   suggestive of pyelonephritis. There are several foci of air seen at the   left upper pole kidney which appear parenchymal, raising possibility of   emphysematous pyelonephritis. Foci of extra luminal air seen on the   previous exam have diminished with a singular focus of air remaining.    Urinary bladder wall air are again appreciated overall decreased compared   to 4/25/2024 compatible with residua of emphysematous cystitis.    Indeterminate density left lower pole renal lesion for which renal   protocol MRI or CT is again advised.    Marked prostate enlargement. There is nodular indentation along the   posterior bladder wall.           HPI:  68M PMH DM presenting to the ED due to fevers and chills. Pt states that he recently had a L ureteroscopy with stent placement on 4/12 with Dr. Evans.  After he was discharged has having intermittent fevers. He had a follow up appointment with Dr. Evans on 4/18 and was empirically started on cipro. His outpatient urine cultures were positive for ESBL and his anitbiotics were switched to augmentin on 4/23. 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 Last night, he had another fever of 102F and continued having left flank pain, nonradiating, sharp, and intermittent. His daughter also reports that he has been having decreased appetite. While in the ED he was experiencing urinary frequency and had one episode of diarrhea. He denies CP, SOB, vomiting, abdominal pain, dysuria, numbness/tingling/weakness in extremities.  (27 Apr 2024 05:08)  Additional history obtained at the time of this consult-  Patient and daughter ( Ms Ailin Pereira - bedside providing translation) confirmed above history.   No acute overnight events. Patient denies fever, chills, abdominal pain, constipation, diarrhea, SOB, cough, chest pain.   Endorses positional discomfort and mild pain at site of ahmadi catheter insertion.  Patient denies recent travel and sick contacts.     PAST MEDICAL & SURGICAL HISTORY:      MEDS:  acetaminophen     Tablet .. 650 milliGRAM(s) Oral every 6 hours PRN  aluminum hydroxide/magnesium hydroxide/simethicone Suspension 30 milliLiter(s) Oral every 4 hours PRN  ertapenem  IVPB 1000 milliGRAM(s) IV Intermittent every 24 hours  ertapenem  IVPB      heparin   Injectable 5000 Unit(s) SubCutaneous every 8 hours  insulin lispro (ADMELOG) corrective regimen sliding scale   SubCutaneous three times a day before meals  insulin lispro (ADMELOG) corrective regimen sliding scale   SubCutaneous at bedtime  melatonin 3 milliGRAM(s) Oral at bedtime PRN  ondansetron Injectable 4 milliGRAM(s) IV Push every 8 hours PRN  sodium chloride 0.9%. 1000 milliLiter(s) IV Continuous <Continuous>  tamsulosin 0.8 milliGRAM(s) Oral at bedtime      ALLERGIES  No Known Allergies      SOCIAL HISTORY: Patient denies smoking, endorses social alcohol consumption ( once / month)   FAMILY HISTORY: No pertinent family history in first degree relatives     ROS:    General: Denies fever and chills	    Skin: Denies rash and pruritus.  	  HEENT: Denies changes in vision and hearing.    Respiratory and Thorax: Denies SOB and cough.  	  Cardiovascular:	Denies chest pain and palpitations    Abdomen:	Denies abdominal pain, nausea, vomiting and diarrhea.    Genitourinary:	+ Mild pain/ discomfort at site of catheter insertion     Musculoskeletal:	 denies myalgia/ arthralgia     Neurological:	Denies headache and syncope.    Psychiatric:	Denies recent changes in mood. Denies anxiety and depression.    Hematology/Lymphatics:	 Denies bleeding / bruising    Endocrine:	Denies any recent weight fluctuations     PHYSICAL EXAM:    Vital Signs Last 24 Hrs  T(C): 36.9 (01 May 2024 05:37), Max: 37.2 (30 Apr 2024 12:22)  T(F): 98.5 (01 May 2024 05:37), Max: 98.9 (30 Apr 2024 12:22)  HR: 98 (01 May 2024 06:01) (94 - 107)  BP: 102/70 (01 May 2024 06:01) (100/68 - 126/61)  RR: 17 (01 May 2024 05:37) (16 - 17)  SpO2: 96% (01 May 2024 05:37) (96% - 97%)    Parameters below as of 01 May 2024 05:37  Patient On (Oxygen Delivery Method): room air    Gen: NAD, speaks in full sentences, no signs of respiratory distress    HEENT: Atraumatic, Normocephalic, EOMI, PERRLA, conjunctiva and sclera clear    Neck: Supple, No JVD    Lymph Nodes: No palpable cervical LN     Back: No palpable spinal tenderness     Chest/Thorax: Clear to auscultation bilaterally; No wheeze; No crackles; No accessory muscles used    Cardiovascular: Regular rate and rhythm; No murmurs;     Gastrointestinal: Soft, Nontender, Nondistended; Bowel sounds present; No guarding     Genitourinary: No CVA or suprapubic tenderness, Ahmadi catheter, draining normal yellow urine     Extremities: 2+ Peripheral Pulses, No cyanosis or edema    Vascular: BL palpable DP pulses     Neurological: No focal sensory / motor deficits, A+Ox 3     Skin: No rashes or lesions    Psychiatric: Normal mood and affect     LABS/DIAGNOSTIC TESTS:                          12.1   12.34 )-----------( 480      ( 01 May 2024 05:50 )             36.5     WBC Count: 12.34 K/uL (05-01 @ 05:50)  WBC Count: 12.24 K/uL (04-30 @ 06:05)  WBC Count: 17.49 K/uL (04-29 @ 06:00)      05-01    135  |  105  |  18  ----------------------------<  145<H>  3.9   |  21<L>  |  1.59<H>    Ca    8.4      01 May 2024 05:50  Phos  3.0     04-30  Mg     2.1     04-30    TPro  7.5  /  Alb  2.3<L>  /  TBili  0.7  /  DBili  x   /  AST  45<H>  /  ALT  49  /  AlkPhos  155<H>  05-01      Urine Microscopic-Add On (NC) (04.26.24 @ 04:51)    White Blood Cell - Urine: 10 /HPF   Red Blood Cell - Urine: 8 /HPF   Bacteria: Moderate /HPF    LIVER FUNCTIONS - ( 01 May 2024 05:50 )  Alb: 2.3 g/dL / Pro: 7.5 g/dL / ALK PHOS: 155 U/L / ALT: 49 U/L DA / AST: 45 U/L / GGT: x           CULTURES:   Culture - Blood (collected 04-29-24 @ 10:55)  Source: .Blood Blood  Preliminary Report (04-30-24 @ 18:02):    No growth at 24 hours    Culture - Blood (collected 04-29-24 @ 10:47)  Source: .Blood Blood  Preliminary Report (04-30-24 @ 18:02):    No growth at 24 hours    Culture - Urine (collected 04-27-24 @ 08:57)  Source: Clean Catch Clean Catch (Midstream)  Final Report (05-01-24 @ 09:46):    50,000 - 99,000 CFU/mL Escherichia coli ESBL  Organism: Escherichia coli ESBL (05-01-24 @ 09:46)  Organism: Escherichia coli ESBL (05-01-24 @ 09:46)      Method Type: CHANDA      -  Amoxicillin/Clavulanic Acid: S <=8/4      -  Ampicillin: R >16 These ampicillin results predict results for amoxicillin      -  Ampicillin/Sulbactam: S <=4/2      -  Aztreonam: R 16      -  Cefazolin: R >16 For uncomplicated UTI with K. pneumoniae, E. coli, or P. mirablis: CHANDA <=16 is sensitive and CHANDA >=32 is resistant. This also predicts results for oral agents cefaclor, cefdinir, cefpodoxime, cefprozil, cefuroxime axetil, cephalexin and locarbef for uncomplicated UTI. Note that some isolates may be susceptible to these agents while testing resistant to cefazolin.      -  Cefepime: R >16      -  Ceftriaxone: R >32      -  Cefuroxime: R >16      -  Ciprofloxacin: R >2      -  Ertapenem: S <=0.5      -  Gentamicin: S <=2      -  Imipenem: S <=1      -  Levofloxacin: R >4      -  Meropenem: S <=1      -  Nitrofurantoin: S <=32 Should not be used to treat pyelonephritis      -  Piperacillin/Tazobactam: S <=8      -  Tobramycin: S <=2      -  Trimethoprim/Sulfamethoxazole: R >2/38    Culture - Blood (collected 04-26-24 @ 22:30)  Source: .Blood Blood-Peripheral  Gram Stain (04-27-24 @ 14:14):    Growth in aerobic bottle: Gram Negative Rods  Final Report (04-29-24 @ 09:00):    Growth in aerobic bottle: Escherichia coli ESBL    Direct identification is available within approximately 3-5    hours either by Blood Panel Multiplexed PCR or Direct    MALDI-TOF. Details: [email or web address]  Organism: Blood Culture PCR  Escherichia coli ESBL (04-29-24 @ 09:00)  Organism: Escherichia coli ESBL (04-29-24 @ 09:00)      Method Type: CHANDA      -  Ampicillin: R >16 These ampicillin results predict results for amoxicillin      -  Ampicillin/Sulbactam: R 8/4      -  Aztreonam: R 16      -  Cefazolin: R >16      -  Cefepime: R >16      -  Ceftriaxone: R >32      -  Ciprofloxacin: R >2      -  Ertapenem: S <=0.5      -  Gentamicin: S <=2      -  Imipenem: S <=1      -  Levofloxacin: R >4      -  Meropenem: S <=1      -  Piperacillin/Tazobactam: R <=8      -  Tobramycin: S <=2      -  Trimethoprim/Sulfamethoxazole: R >2/38  Organism: Blood Culture PCR (04-29-24 @ 09:00)      Method Type: PCR      -  Escherichia coli: Detec      -  ESBL: Detec      -  CTX-M Resistance Marker: Detec    Culture - Blood (collected 04-26-24 @ 22:15)  Source: .Blood Blood-Peripheral  Gram Stain (04-27-24 @ 14:52):    Growth in aerobic bottle: Gram Negative Rods    Growth in anaerobic bottle: Gram Negative Rods  Final Report (04-29-24 @ 08:59):    Growth in aerobic and anaerobic bottles: Escherichia coli ESBL    See previous culture [id number]    Direct identification is available within approximately 3-5    hours either by Blood Panel Multiplexed PCR or Direct    MALDI-TOF. Details: [email or web address]    Culture - Urine (collected 04-10-24 @ 01:02)  Source: Clean Catch Clean Catch (Midstream)  Final Report (04-11-24 @ 09:47):    <10,000 CFU/mL Normal Urogenital Maddison      RADIOLOGY     XR CHEST PORTABLE URGENT 1V   ORDERED BY: SHAGUFTA GARCIA     PROCEDURE DATE:  04/26/2024          INTERPRETATION:  History: Sepsis.    TECHNIQUE: Single frontal view of the chest.    COMPARISON: None.    FINDINGS:    The lungs are clear. Cardiac silhouette is normal in size. Osseous   structures are normal.    IMPRESSION: No active pulmonary disease.    CT ABDOMEN AND PELVIS   ORDERED BY: LIO GUNN     PROCEDURE DATE:  04/27/2024          INTERPRETATION:  CLINICAL INFORMATION: Left flank pain and fever.    COMPARISON: 6 4/25/2024.    CONTRAST/COMPLICATIONS:  IV Contrast: None.  Oral Contrast: None.  Complications: None.    PROCEDURE:  CT of the Abdomen and Pelvis was performed.  Sagittal and coronal reformats were performed.    FINDINGS:  LOWER CHEST: Dependent atelectasis is appreciated. Right posterior medial   osteophyte lung is noted.    LIVER: Calcified right hepatic lobe granuloma. There is a lobulated left   hepatic lobe cyst.  BILE DUCTS: Normal caliber.  GALLBLADDER: Within normal limits.  SPLEEN: Within normal limits.  PANCREAS: Motion is seen in the region of the pancreatic head. Otherwise,   within normal limits.  ADRENALS: Within normal limits.  KIDNEYS/URETERS: There is left-sided nephroureteral stent, in   satisfactory position. There is increased left-sided perinephric   stranding when compared to the previous exam. 2 foci of air are seen   within the left upper pole kidney, which appear parenchymal. There are 2   adjacent stones seen within the left upper pole kidney which measure up   to 3 mm. There are at least 2 discrete stones of the right kidney which   measure up to 3 mm. Additional faint punctate stones may be present   bilaterally. There is a right renal cyst. Indeterminate 2 cm left lower   pole lesion is again appreciated. This would be better evaluated with   renal protocol MRI or CT.    BLADDER: Again appreciated is a thick-walled urinary bladder. Bladder   wall emphysema is again noted. There is a tiny focus of extraluminal air   laterally within the left pelvis. This is possibly associated with a   small vessel. Previously identified additional extra luminal foci of air   are no longer visualized. There is a 4 mm bladder stone within the left   posterior bladder. This likely corresponds to previously noted right   ureterovesicular junction stone.  REPRODUCTIVE ORGANS: Prostate gland is markedly enlarged. There is   nodular indentation of the posterior bladder.    BOWEL: No bowel obstruction. Appendix is normal. There are occasional   colonic diverticula. No acute diverticulitis.  PERITONEUM: No ascites.  VESSELS: Within normal limits.  RETROPERITONEUM/LYMPH NODES: No lymphadenopathy.  ABDOMINAL WALL: Within normal limits.  BONES: Degenerative changes.    IMPRESSION:  Increased left-sided perinephric stranding compared to the previous exam   suggestive of pyelonephritis. There are several foci of air seen at the   left upper pole kidney which appear parenchymal, raising possibility of   emphysematous pyelonephritis. Foci of extra luminal air seen on the   previous exam have diminished with a singular focus of air remaining.    Urinary bladder wall air are again appreciated overall decreased compared   to 4/25/2024 compatible with residua of emphysematous cystitis.    Indeterminate density left lower pole renal lesion for which renal   protocol MRI or CT is again advised.    Marked prostate enlargement. There is nodular indentation along the   posterior bladder wall.

## 2024-05-01 NOTE — PROGRESS NOTE ADULT - SUBJECTIVE AND OBJECTIVE BOX
Patient is a 68y old  Male who presents with a chief complaint of septic shock 2/2 pyelonephritis (01 May 2024 09:57)      INTERVAL HPI/OVERNIGHT EVENTS: no overnight events    I&O's Summary    30 Apr 2024 07:01  -  01 May 2024 07:00  --------------------------------------------------------  IN: 0 mL / OUT: 1500 mL / NET: -1500 mL      Vital Signs Last 24 Hrs  T(C): 36.9 (01 May 2024 05:37), Max: 36.9 (30 Apr 2024 20:03)  T(F): 98.5 (01 May 2024 05:37), Max: 98.5 (01 May 2024 05:37)  HR: 98 (01 May 2024 06:01) (94 - 107)  BP: 102/70 (01 May 2024 06:01) (100/68 - 126/61)  BP(mean): --  RR: 17 (01 May 2024 05:37) (16 - 17)  SpO2: 96% (01 May 2024 05:37) (96% - 97%)    Parameters below as of 01 May 2024 05:37  Patient On (Oxygen Delivery Method): room air      PAST MEDICAL & SURGICAL HISTORY:      SOCIAL HISTORY  Alcohol:  Tobacco:  Illicit substance use:      FAMILY HISTORY:      LABS:                        12.1   12.34 )-----------( 480      ( 01 May 2024 05:50 )             36.5     05-01    135  |  105  |  18  ----------------------------<  145<H>  3.9   |  21<L>  |  1.59<H>    Ca    8.4      01 May 2024 05:50  Phos  3.0     04-30  Mg     2.1     04-30    TPro  7.5  /  Alb  2.3<L>  /  TBili  0.7  /  DBili  x   /  AST  45<H>  /  ALT  49  /  AlkPhos  155<H>  05-01      Urinalysis Basic - ( 01 May 2024 05:50 )    Color: x / Appearance: x / SG: x / pH: x  Gluc: 145 mg/dL / Ketone: x  / Bili: x / Urobili: x   Blood: x / Protein: x / Nitrite: x   Leuk Esterase: x / RBC: x / WBC x   Sq Epi: x / Non Sq Epi: x / Bacteria: x      CAPILLARY BLOOD GLUCOSE      POCT Blood Glucose.: 203 mg/dL (01 May 2024 11:22)  POCT Blood Glucose.: 125 mg/dL (01 May 2024 08:11)  POCT Blood Glucose.: 107 mg/dL (30 Apr 2024 21:04)  POCT Blood Glucose.: 119 mg/dL (30 Apr 2024 17:32)  POCT Blood Glucose.: 171 mg/dL (30 Apr 2024 16:16)        Urinalysis Basic - ( 01 May 2024 05:50 )    Color: x / Appearance: x / SG: x / pH: x  Gluc: 145 mg/dL / Ketone: x  / Bili: x / Urobili: x   Blood: x / Protein: x / Nitrite: x   Leuk Esterase: x / RBC: x / WBC x   Sq Epi: x / Non Sq Epi: x / Bacteria: x        MEDICATIONS  (STANDING):  ertapenem  IVPB      ertapenem  IVPB 1000 milliGRAM(s) IV Intermittent every 24 hours  heparin   Injectable 5000 Unit(s) SubCutaneous every 8 hours  insulin lispro (ADMELOG) corrective regimen sliding scale   SubCutaneous three times a day before meals  insulin lispro (ADMELOG) corrective regimen sliding scale   SubCutaneous at bedtime  sodium chloride 0.9%. 1000 milliLiter(s) (75 mL/Hr) IV Continuous <Continuous>  tamsulosin 0.8 milliGRAM(s) Oral at bedtime    MEDICATIONS  (PRN):  acetaminophen     Tablet .. 650 milliGRAM(s) Oral every 6 hours PRN Temp greater or equal to 38C (100.4F), Mild Pain (1 - 3)  aluminum hydroxide/magnesium hydroxide/simethicone Suspension 30 milliLiter(s) Oral every 4 hours PRN Dyspepsia  melatonin 3 milliGRAM(s) Oral at bedtime PRN Insomnia  ondansetron Injectable 4 milliGRAM(s) IV Push every 8 hours PRN Nausea and/or Vomiting      REVIEW OF SYSTEMS:  CONSTITUTIONAL: No fever  EYES: No eye pain, visual disturbances  ENMT:  No sinus or throat pain  NECK: No pain or stiffness  RESPIRATORY: No cough, wheezing, No shortness of breath  CARDIOVASCULAR: No chest pain, palpitations,  GASTROINTESTINAL: No abdominal pain. No nausea, vomiting, + diarrhea.  GENITOURINARY: No dysuria  NEUROLOGICAL: + slight headache  SKIN: No rashes  MUSCULOSKELETAL: No joint pain     RADIOLOGY & ADDITIONAL TESTS:  < from: Xray Chest 1 View- PORTABLE-Urgent (04.26.24 @ 23:15) >    ACC: [id number] EXAM:  XR CHEST PORTABLE URGENT 1V   ORDERED BY: SHAGUFTA GARCIA     PROCEDURE DATE:  04/26/2024          INTERPRETATION:  History: Sepsis.    TECHNIQUE: Single frontal view of the chest.    COMPARISON: None.    FINDINGS:    The lungs are clear. Cardiac silhouette is normal in size. Osseous   structures are normal.    IMPRESSION: No active pulmonary disease.    --- End of Report ---    CHARLES MORALES MD; Attending Radiologist  This document has been electronically signed. Apr 27 2024 11:57AM    < end of copied text >    ACC: [id number] EXAM:  CT ABDOMEN AND PELVIS   ORDERED BY: LIO GUNN     PROCEDURE DATE:  04/27/2024          INTERPRETATION:  CLINICAL INFORMATION: Left flank pain and fever.    COMPARISON: 6 4/25/2024.    CONTRAST/COMPLICATIONS:  IV Contrast:None.  Oral Contrast: None.  Complications: None.    PROCEDURE:  CT of the Abdomen and Pelvis was performed.  Sagittal and coronal reformats were performed.    FINDINGS:  LOWER CHEST: Dependent atelectasis is appreciated. Right posterior medial   osteophyte lung is noted.    LIVER: Calcified right hepatic lobe granuloma. There is a lobulated left   hepatic lobe cyst.  BILE DUCTS: Normal caliber.  GALLBLADDER: Within normal limits.  SPLEEN: Within normal limits.  PANCREAS: Motion is seen in the region of the pancreatic head. Otherwise,   within normal limits.  ADRENALS: Within normal limits.  KIDNEYS/URETERS: There is left-sided nephroureteral stent, in   satisfactory position. There is increased left-sided perinephric   stranding when compared to the previous exam. 2 foci of air are seen   within the left upper pole kidney, which appear parenchymal. There are 2   adjacent stones seen within the left upper pole kidney which measure up   to 3 mm. There are at least 2 discrete stones of the right kidney which   measure up to 3 mm. Additional faint punctate stones may be present   bilaterally. There is a right renal cyst. Indeterminate 2 cm left lower   pole lesion is again appreciated. This would be better evaluated with   renal protocolMRI or CT.    BLADDER: Again appreciated is a thick-walled urinary bladder. Bladder   wall emphysema is again noted. There is a tiny focus of extraluminal air   laterally within the left pelvis. This is possibly associated with a   small vessel. Previously identified additional extra luminal foci of air   are no longer visualized. There is a 4 mm bladder stone within the left   posterior bladder. This likely corresponds to previously noted right   ureterovesicular junction stone.  REPRODUCTIVE ORGANS: Prostate gland is markedly enlarged. There is   nodular indentation of the posterior bladder.    BOWEL: No bowel obstruction. Appendix is normal. There are occasional   colonic diverticula. No acute diverticulitis.  PERITONEUM: No ascites.  VESSELS: Within normal limits.  RETROPERITONEUM/LYMPH NODES: No lymphadenopathy.  ABDOMINAL WALL: Within normal limits.  BONES: Degenerative changes.    IMPRESSION:  Increased left-sided perinephric stranding compared to the previous exam   suggestive of pyelonephritis. There are several foci of air seen at the   left upper pole kidney which appear parenchymal, raising possibility of   emphysematous pyelonephritis. Foci of extra luminal air seen on the   previous exam have diminished with a singular focus of air remaining.    Urinary bladder wall air are again appreciated overall decreased compared   to 4/25/2024 compatible with residua of emphysematous cystitis.    Indeterminate density left lower pole renal lesion for which renal   protocol MRI or CT is again advised.    Marked prostate enlargement. There is nodular indentation along the   posterior bladder wall.    --- End of Report ---      Imaging Personally Reviewed:  x ] YES  [ ] NO    Consultant(s) Notes Reviewed:  [x ] YES  [ ] NO    PHYSICAL EXAM:  GENERAL: NAD  HEAD:  Atraumatic, Normocephalic  EYES: conjunctiva and sclera clear  ENMT: Moist mucous membranes  NECK: Supple  NERVOUS SYSTEM:  Alert & Oriented X3, Good concentration (- [id number])  CHEST/LUNG: CTA bilaterally; No rales, rhonchi, wheezing  HEART: Regular rate and rhythm  ABDOMEN: Soft, Nontender, Nondistended; Bowel sounds present  EXTREMITIES: No clubbing, cyanosis, or edema  SKIN: No rashes    Care Collaborated Discussed with Consultants/Other Providers [x ] YES  [ ] NO

## 2024-05-01 NOTE — PROGRESS NOTE ADULT - PROBLEM SELECTOR PLAN 3
H/o Recent L. ureteroscopy with stent placement on 4/12  plan as above. - H/o Recent L. ureteroscopy with stent placement on 4/12    urology follows recc: no acute surgical intervention indicated  - stent is in place and should be maintained  - continue ahmadi catheter in the setting of emphysematous cystitis-> TOV per primary team when medically stable  - f/u outpt Dr. Evans  - plan as above.

## 2024-05-01 NOTE — PROGRESS NOTE ADULT - PROBLEM SELECTOR PLAN 2
- Ucx-  Esbl e.coli  - 4/26 (+) Bld cx-ESBL E. Coli   - 4/29 Repeat Bld cx- NGTD  - c/w ertapenem ( need total 14 day course )  - Id Dr. Franklin - P/w presenting with fevers, chills, left flank pain.    - Met sepsis on admission: Temp 98.2, HR 97, BP 95/64, RR 18, 98% on RA, WBC 23K  - ct a/p- suggestive of pyelonephritis.  - OP UCx (+) ESBL, sensitive to Ertapenem  - S/p 1L IVF in ED  ------> resolved

## 2024-05-02 ENCOUNTER — APPOINTMENT (OUTPATIENT)
Dept: UROLOGY | Facility: CLINIC | Age: 69
End: 2024-05-02

## 2024-05-02 LAB
ALBUMIN SERPL ELPH-MCNC: 2.4 G/DL — LOW (ref 3.5–5)
ALP SERPL-CCNC: 176 U/L — HIGH (ref 40–120)
ALT FLD-CCNC: 60 U/L DA — SIGNIFICANT CHANGE UP (ref 10–60)
ANION GAP SERPL CALC-SCNC: 7 MMOL/L — SIGNIFICANT CHANGE UP (ref 5–17)
ANISOCYTOSIS BLD QL: SLIGHT — SIGNIFICANT CHANGE UP
AST SERPL-CCNC: 53 U/L — HIGH (ref 10–40)
BASE EXCESS BLDV CALC-SCNC: -1.2 MMOL/L — SIGNIFICANT CHANGE UP
BASOPHILS # BLD AUTO: 0 K/UL — SIGNIFICANT CHANGE UP (ref 0–0.2)
BASOPHILS NFR BLD AUTO: 0 % — SIGNIFICANT CHANGE UP (ref 0–2)
BILIRUB SERPL-MCNC: 0.5 MG/DL — SIGNIFICANT CHANGE UP (ref 0.2–1.2)
BUN SERPL-MCNC: 20 MG/DL — HIGH (ref 7–18)
CALCIUM SERPL-MCNC: 8.7 MG/DL — SIGNIFICANT CHANGE UP (ref 8.4–10.5)
CHLORIDE SERPL-SCNC: 107 MMOL/L — SIGNIFICANT CHANGE UP (ref 96–108)
CO2 SERPL-SCNC: 21 MMOL/L — LOW (ref 22–31)
CREAT SERPL-MCNC: 1.64 MG/DL — HIGH (ref 0.5–1.3)
CRP SERPL-MCNC: 49 MG/L — HIGH
EGFR: 45 ML/MIN/1.73M2 — LOW
ELLIPTOCYTES BLD QL SMEAR: SLIGHT — SIGNIFICANT CHANGE UP
EOSINOPHIL # BLD AUTO: 0 K/UL — SIGNIFICANT CHANGE UP (ref 0–0.5)
EOSINOPHIL NFR BLD AUTO: 0 % — SIGNIFICANT CHANGE UP (ref 0–6)
ERYTHROCYTE [SEDIMENTATION RATE] IN BLOOD: 89 MM/HR — HIGH (ref 0–20)
GLUCOSE BLDC GLUCOMTR-MCNC: 113 MG/DL — HIGH (ref 70–99)
GLUCOSE BLDC GLUCOMTR-MCNC: 134 MG/DL — HIGH (ref 70–99)
GLUCOSE BLDC GLUCOMTR-MCNC: 151 MG/DL — HIGH (ref 70–99)
GLUCOSE BLDC GLUCOMTR-MCNC: 160 MG/DL — HIGH (ref 70–99)
GLUCOSE SERPL-MCNC: 162 MG/DL — HIGH (ref 70–99)
HCO3 BLDV-SCNC: 24 MMOL/L — SIGNIFICANT CHANGE UP (ref 22–29)
HCT VFR BLD CALC: 36.7 % — LOW (ref 39–50)
HGB BLD-MCNC: 12.1 G/DL — LOW (ref 13–17)
HYPOCHROMIA BLD QL: SLIGHT — SIGNIFICANT CHANGE UP
LYMPHOCYTES # BLD AUTO: 1.18 K/UL — SIGNIFICANT CHANGE UP (ref 1–3.3)
LYMPHOCYTES # BLD AUTO: 12 % — LOW (ref 13–44)
MACROCYTES BLD QL: SLIGHT — SIGNIFICANT CHANGE UP
MANUAL SMEAR VERIFICATION: SIGNIFICANT CHANGE UP
MCHC RBC-ENTMCNC: 28 PG — SIGNIFICANT CHANGE UP (ref 27–34)
MCHC RBC-ENTMCNC: 33 GM/DL — SIGNIFICANT CHANGE UP (ref 32–36)
MCV RBC AUTO: 85 FL — SIGNIFICANT CHANGE UP (ref 80–100)
MICROCYTES BLD QL: SLIGHT — SIGNIFICANT CHANGE UP
MONOCYTES # BLD AUTO: 0.2 K/UL — SIGNIFICANT CHANGE UP (ref 0–0.9)
MONOCYTES NFR BLD AUTO: 2 % — SIGNIFICANT CHANGE UP (ref 2–14)
NEUTROPHILS # BLD AUTO: 7.86 K/UL — HIGH (ref 1.8–7.4)
NEUTROPHILS NFR BLD AUTO: 80 % — HIGH (ref 43–77)
NRBC # BLD: 0 /100 WBCS — SIGNIFICANT CHANGE UP (ref 0–0)
OVALOCYTES BLD QL SMEAR: SLIGHT — SIGNIFICANT CHANGE UP
PCO2 BLDV: 39 MMHG — LOW (ref 42–55)
PH BLDV: 7.39 — SIGNIFICANT CHANGE UP (ref 7.32–7.43)
PLAT MORPH BLD: NORMAL — SIGNIFICANT CHANGE UP
PLATELET # BLD AUTO: 435 K/UL — HIGH (ref 150–400)
PLATELET COUNT - ESTIMATE: NORMAL — SIGNIFICANT CHANGE UP
PO2 BLDV: 41 MMHG — SIGNIFICANT CHANGE UP
POIKILOCYTOSIS BLD QL AUTO: SLIGHT — SIGNIFICANT CHANGE UP
POTASSIUM SERPL-MCNC: 3.7 MMOL/L — SIGNIFICANT CHANGE UP (ref 3.5–5.3)
POTASSIUM SERPL-SCNC: 3.7 MMOL/L — SIGNIFICANT CHANGE UP (ref 3.5–5.3)
PROT SERPL-MCNC: 7.4 G/DL — SIGNIFICANT CHANGE UP (ref 6–8.3)
RBC # BLD: 4.32 M/UL — SIGNIFICANT CHANGE UP (ref 4.2–5.8)
RBC # FLD: 14.5 % — SIGNIFICANT CHANGE UP (ref 10.3–14.5)
RBC BLD AUTO: ABNORMAL
SAO2 % BLDV: 69.6 % — SIGNIFICANT CHANGE UP
SODIUM SERPL-SCNC: 135 MMOL/L — SIGNIFICANT CHANGE UP (ref 135–145)
SPHEROCYTES BLD QL SMEAR: SLIGHT — SIGNIFICANT CHANGE UP
STOMATOCYTES BLD QL SMEAR: SLIGHT — SIGNIFICANT CHANGE UP
VARIANT LYMPHS # BLD: 6 % — SIGNIFICANT CHANGE UP (ref 0–6)
WBC # BLD: 9.83 K/UL — SIGNIFICANT CHANGE UP (ref 3.8–10.5)
WBC # FLD AUTO: 9.83 K/UL — SIGNIFICANT CHANGE UP (ref 3.8–10.5)

## 2024-05-02 PROCEDURE — 99233 SBSQ HOSP IP/OBS HIGH 50: CPT

## 2024-05-02 PROCEDURE — 36000 PLACE NEEDLE IN VEIN: CPT

## 2024-05-02 PROCEDURE — 76937 US GUIDE VASCULAR ACCESS: CPT | Mod: 26

## 2024-05-02 RX ORDER — LORATADINE 10 MG/1
10 TABLET ORAL ONCE
Refills: 0 | Status: COMPLETED | OUTPATIENT
Start: 2024-05-02 | End: 2024-05-02

## 2024-05-02 RX ADMIN — HEPARIN SODIUM 5000 UNIT(S): 5000 INJECTION INTRAVENOUS; SUBCUTANEOUS at 21:08

## 2024-05-02 RX ADMIN — HEPARIN SODIUM 5000 UNIT(S): 5000 INJECTION INTRAVENOUS; SUBCUTANEOUS at 05:13

## 2024-05-02 RX ADMIN — TAMSULOSIN HYDROCHLORIDE 0.8 MILLIGRAM(S): 0.4 CAPSULE ORAL at 21:08

## 2024-05-02 RX ADMIN — Medication 3 MILLIGRAM(S): at 21:08

## 2024-05-02 RX ADMIN — HEPARIN SODIUM 5000 UNIT(S): 5000 INJECTION INTRAVENOUS; SUBCUTANEOUS at 14:06

## 2024-05-02 RX ADMIN — LORATADINE 10 MILLIGRAM(S): 10 TABLET ORAL at 21:08

## 2024-05-02 NOTE — PROGRESS NOTE ADULT - ASSESSMENT
68 year old male patient with pmhx DM, HLD, BPH, kidney stones, recent left ureteral stent placement 4/12/24 who presented to the ER due to fever despite outpatient antibiotics.  He has been having fever with poor oral intake and was started on Cipro by his Urologist. Urine culture grew ESBL EColi resistant to Cipro so he was switched to Augmentin on Tuesday. In the ER patient found to have severe sepsis with elevated lactic acid.    A/P  # Severe Sepsis  # Left Pyelonephritis  # Elevated Lactic Acid  # leukocytosis  - ESBL EColi on Outpatient Urine Culture 4/22/2024  - Lactate 3.2 --> 2.5 _> 3.5  - IV Ertapenem  - urine culture and blood culture + for gram - rods  - WBC 23> 51, f/u repeat. monitor wbc  - Follow up repeat Lactate  - Pt still hypotensive, will give 4th L bolus, low threshold for ICU consult  - Urology consulted  - Case discussed with ID Dr. Louis for abx approval.  - Fallon inserted in ER; monitor urine output.   - Tylenol prn        # SHAHBAZ  2/2 Severe Sepsis  - IV Fluid Hydration  - s/p 3 Liters IV fluids, will give 4th liter now  - monitor BMP and kidney function    # Elevated LFTs  2/2 Severe Sepsis  - IV Fluid Hydration  - monitor lfts    # Hx of HLD  - Continue home medication Statin    # Hx of DM  - Insulin Sliding Scale  - Blood Glucose Monitoring    # DVT PPx  - Heparin    # FEN  - IV Fluid Hydration  - Monitor and replete electrolytes as needed  - DASH Diabetic Diet
68 year old, M, from home, w/ PMH of DM.  Presenting to the ED due to fevers, chills, & left flank pain.  Admitted for Severe sepsis 2/2 pyelonephritis.    
Mr. Cervantes is a 68 year old man with PMHx DM, prostatic hypertrophy, renal stones s/p L ureteroscopy with stent placement 4/12 (Dr. Evans), presenting with emphysematous urinary tract infection.    Recommendations:  - no acute surgical intervention indicated, stent is in place and should be maintained  - continue ahmadi catheter in the setting of emphysematous cystitis-> TOV per primary team when medically stable  - medical admission and resuscitation  - treatment of ESBL Ecoli  - BCx: e. coli   - patient to follow-up outpatient with Dr. Evans for stent management     
Mr. Cervantes is a 68 year old man with PMHx DM, prostatic hypertrophy, renal stones s/p L ureteroscopy with stent placement 4/12 (Dr. Evans), presenting with emphysematous urinary tract infection.    Recommendations:  - no acute surgical intervention indicated, stent is in place and should be maintained  - continue ahmadi catheter in the setting of emphysematous cystitis   - medical admission and resuscitation  - treatment of ESBL Ecoli  - BCx    
68 year old male patient with pmhx DM, HLD, BPH, kidney stones, recent left ureteral stent placement 4/12/24 who presented to the ER due to fever despite outpatient antibiotics.  He has been having fever with poor oral intake and was started on Cipro by his Urologist. Urine culture grew ESBL EColi resistant to Cipro so he was switched to Augmentin on Tuesday. In the ER patient found to have severe sepsis with elevated lactic acid.    A/P  # Severe Sepsis  # Left Pyelonephritis  # Elevated Lactic Acid  # leukocytosis  - ESBL EColi on Outpatient Urine Culture 4/22/2024  - Lactate 3.2 --> 2.5 _> 3.5 > 2.1 > 2.6  - IV Ertapenem  - urine culture and blood culture + for ESBL Ecoli  - WBC 23> 51> 40 > 24, continue to monitor wbc  - Follow up repeat Lactate  - monitor blood pressure, SBP around 90s 100, map > 65  - Urology consulted  - Case discussed with ID Dr. Louis for abx approval.  - Fallon inserted in ER; monitor urine output.   - Tylenol prn    # SHAHBAZ  2/2 Severe Sepsis  - IV Fluid Hydration  - s/p 4 L IV bolus on admission  - monitor BMP and kidney function  - creatinine improving, continue to monitor     # Elevated LFTs  2/2 Severe Sepsis  - IV Fluid Hydration  - monitor lfts, trending down    # Hx of HLD  - Continue home medication Statin    # Hx of DM  - Insulin Sliding Scale  - Blood Glucose Monitoring    # DVT PPx  - Heparin    
Mr. Cervantes is a 68 year old man with PMHx DM, prostatic hypertrophy, renal stones s/p L ureteroscopy with stent placement 4/12 (Dr. Evans), presenting with emphysematous urinary tract infection.    Recommendations:  - no acute surgical intervention indicated, stent is in place and should be maintained  - continue ahmadi catheter in the setting of emphysematous cystitis   - medical admission and resuscitation  - treatment of ESBL Ecoli  - BCx: e. coli   - patient to follow-up outpatient with Dr. Evans for stent management     
68 year old, M, from home, w/ PMH of DM.  Presenting to the ED due to fevers, chills, & left flank pain.  Admitted for Severe sepsis 2/2 pyelonephritis.    

## 2024-05-02 NOTE — PROGRESS NOTE ADULT - PROBLEM SELECTOR PLAN 4
- P/w SCr 2.15 on admission  - Likely 2/2 obstruction   - S/p IVF  - C/w Ahmadi.  urology rec:c dc w/ ahmadi for OP Uro f/u with Dr. Evans for stent management   - Continue to monitor Scr. - P/w SCr 2.15 on admission  - Likely 2/2 obstruction   - S/p IVF  - C/w Fallon.  urology rec: dc w/ galdino for OP Uro f/u with Dr. Evans for stent management   - Continue to monitor Scr.

## 2024-05-02 NOTE — PROGRESS NOTE ADULT - PROBLEM SELECTOR PROBLEM 4
SHAHBAZ (acute kidney injury)

## 2024-05-02 NOTE — PROGRESS NOTE ADULT - NS ATTEND AMEND GEN_ALL_CORE FT
68 year old male patient with pmhx DM, HLD, BPH, kidney stones, recent left ureteral stent placement 4/12/24 who presented to the ER due to fever despite outpatient antibiotics.  He has been having fever with poor oral intake and was started on Cipro by his Urologist. Urine culture grew ESBL EColi resistant to Cipro so he was switched to Augmentin on Tuesday. In the ER patient found to have severe sepsis with elevated lactic acid.    A/P  # Severe Sepsis  # Left Pyelonephritis  # Elevated Lactic Acid  # leukocytosis  - ESBL EColi on Outpatient Urine Culture 4/22/2024  - Lactate 3.2 --> 2.5 _> 3.5 > 2.1 > 2.6  - IV Ertapenem increased to 1g as renal function improved  - urine culture and blood culture + for ESBL Ecoli  - WBC 23> 51> 40 > 24>17, continue to monitor wbc  - Follow up repeat Lactate  - monitor blood pressure, SBP around 90s 100, map > 65  - Urology consulted  - Case discussed with ID Dr. Louis for abx approval.  - Fallon inserted in ER; monitor urine output.   - Tylenol prn    # SHAHBAZ  2/2 Severe Sepsis  - IV Fluid Hydration  - s/p 4 L IV bolus on admission  - monitor BMP and kidney function  - creatinine improving, continue to monitor     # Elevated LFTs  2/2 Severe Sepsis  - IV Fluid Hydration  - monitor lfts, trending down    # Hx of HLD  - Continue home medication Statin    # Hx of DM  - Insulin Sliding Scale  - Blood Glucose Monitoring    # DVT PPx  - Heparin
68 year old male patient with pmhx DM, HLD, BPH, kidney stones, recent left ureteral stent placement 4/12/24 who presented to the ER due to fever despite outpatient antibiotics.  He has been having fever with poor oral intake and was started on Cipro by his Urologist. Urine culture grew ESBL EColi resistant to Cipro so he was switched to Augmentin on Tuesday. In the ER patient found to have severe sepsis with elevated lactic acid.    Seen and examined with the assistance of  [place] [id number]    Granddaughter at bedside, feeling well, no new complaints no overnight events. Denying any chest pain or sob, no fever chills no abdominal pain.     Physical: Susan cute distress, heart regular, lungs clear,a bd soft and nontender, ahmadi in place, clear yellow urine    A/P  # Severe Sepsis  # Left Pyelonephritis  # Elevated Lactic Acid  # leukocytosis    -ESBL e>coli bacteremia in the setting of left pyelo, emphysematous bladder, left stent placed 4/12  lactate normalized, repeat blood cultures are pending  ertapenem dose renally adjusted  - urine culture and blood culture + for ESBL Ecoli  - leukocytosis improving  - monitor blood pressure, Appears stable thus far  - Urology consulted- they recommend outpatient stent management  - Ahmadi inserted in ER; monitor urine output- dc with ahmadi given emphysematous bladder   - Tylenol prn  - Asked ID to evaluate regarding duration of antibiotics especially with severe sepsis and likely source- left kidney with stent    # SHAHBAZ  2/2 Severe Sepsis  - IV Fluid Hydration  - s/p 4 L IV bolus on admission  - monitor BMP and kidney function  - creatinine improving, continue to monitor     # Elevated LFTs  2/2 Severe Sepsis  - IV Fluid Hydration  - monitor lfts, trending down    # Hx of HLD  - Continue home medication Statin    # Hx of DM  - Insulin Sliding Scale  - Blood Glucose Monitoring    # DVT PPx  - Heparin
68 year old male patient with pmhx DM, HLD, BPH, kidney stones, recent left ureteral stent placement 4/12/24 who presented to the ER due to fever despite outpatient antibiotics.  He has been having fever with poor oral intake and was started on Cipro by his Urologist. Urine culture grew ESBL EColi resistant to Cipro so he was switched to Augmentin on Tuesday. In the ER patient found to have severe sepsis with elevated lactic acid.    Patient's granddaughter providing translation    No new complaints, no overnight events. feeling generally well. Ahmadi was removed but patient still having flank pain with urination, this is likely due to his stones/stent    no fever no chills no pain    Physical: Susan cute distress, heart regular, lungs clear, abd soft and nontender, ahmadi in place, clear yellow urine    A/P  # Severe Sepsis  # Left Pyelonephritis  # Obstructive renal stone s/p left renal stent  #ESBL E. Coli Bacteremia with urine source  # Elevated Lactic Acid  # leukocytosis  # SHAHBAZ  # Transaminitis  # Hx of HLD  # Hx of DM  # DVT PPx      With ESBL E. Coli in urine and blood the source is from his urine. Stent needs to be addressed- removal vs exchange.  Discussed with ID- agree with removing stent PRIOR to completion of antibiotics  Discussed with urology- Outpatient stent management  Compromised with stent removal prior to completion of antibiotics, patient has a UROLOGY APPOINTMENT MAY 10th @ 8:40AM with Dr. Edmund Evans  Repeat blood cultures negative, continue with ertapenem  Has no oral options, needs IV antibiotics until 5/13- ertapenem  extended dwell placed  Antibiotic infusion to be set up at home  Continue sliding scale for diabetes  Liver enzymes improved
68 year old male patient with pmhx DM, HLD, BPH, kidney stones, recent left ureteral stent placement 4/12/24 who presented to the ER due to fever despite outpatient antibiotics.  He has been having fever with poor oral intake and was started on Cipro by his Urologist. Urine culture grew ESBL EColi resistant to Cipro so he was switched to Augmentin on Tuesday. In the ER patient found to have severe sepsis with elevated lactic acid.    Seen and examined with the assistance of  [place] [id number]    No new complaints, no overnight events. feeling generally well    Physical: Susan cute distress, heart regular, lungs clear,a bd soft and nontender, ahmadi in place, clear yellow urine    A/P  # Severe Sepsis  # Left Pyelonephritis  # Obstructive renal stone s/p left renal stent  #ESBL E. Coli Bacteremia with urine source  # Elevated Lactic Acid  # leukocytosis  # SHAHBAZ  # Transaminitis  # Hx of HLD  # Hx of DM  # DVT PPx      With ESBL E. Coli in urine and blood the source is from his urine. Stent needs to be addressed- removal vs exchange.  Appreciate ID evaluation, need followup with urology regarding stent  Repeat blood cultures negative, continue with ertapenem  Has no oral options, needs IV antibiotics until 5/13- ertapenem  home vs at facility, however need to address stent first.  Acidosis and leukocytosis improved  Continue sliding scale for diabetes  Liver enzymes improved

## 2024-05-02 NOTE — PROGRESS NOTE ADULT - PROBLEM SELECTOR PLAN 2
- P/w presenting with fevers, chills, left flank pain.    - Met sepsis on admission: Temp 98.2, HR 97, BP 95/64, RR 18, 98% on RA, WBC 23K  - ct a/p- suggestive of pyelonephritis.  - OP UCx (+) ESBL, sensitive to Ertapenem  - S/p 1L IVF in ED  ------> resolved

## 2024-05-02 NOTE — PROGRESS NOTE ADULT - PROBLEM SELECTOR PLAN 1
- Ucx-  Esbl e.coli  - 4/26 (+) Bld cx-ESBL E. Coli   - 4/29 Repeat Bld cx- NGTD  - Id Dr. Franklin [facility]:  Ertapenem 1g q24 hrs for 14 days after neg blood cultures (until 5/13)  patient has appt. with Urology on 5/10 for stent removal  Monitor renal function  Surveillance labs CBC w/diff, CMP, ESR and CRP

## 2024-05-02 NOTE — PROGRESS NOTE ADULT - SUBJECTIVE AND OBJECTIVE BOX
NP Note discussed with  Primary Attending    Patient is a 68y old  Male who presents with a chief complaint of septic shock 2/2 pyelonephritis (01 May 2024 12:32)      INTERVAL HPI/OVERNIGHT EVENTS: no acute events overnight    MEDICATIONS  (STANDING):  ertapenem  IVPB      ertapenem  IVPB 1000 milliGRAM(s) IV Intermittent every 24 hours  heparin   Injectable 5000 Unit(s) SubCutaneous every 8 hours  insulin lispro (ADMELOG) corrective regimen sliding scale   SubCutaneous three times a day before meals  insulin lispro (ADMELOG) corrective regimen sliding scale   SubCutaneous at bedtime  sodium chloride 0.9%. 1000 milliLiter(s) (75 mL/Hr) IV Continuous <Continuous>  tamsulosin 0.8 milliGRAM(s) Oral at bedtime    MEDICATIONS  (PRN):  acetaminophen     Tablet .. 650 milliGRAM(s) Oral every 6 hours PRN Temp greater or equal to 38C (100.4F), Mild Pain (1 - 3)  aluminum hydroxide/magnesium hydroxide/simethicone Suspension 30 milliLiter(s) Oral every 4 hours PRN Dyspepsia  melatonin 3 milliGRAM(s) Oral at bedtime PRN Insomnia  ondansetron Injectable 4 milliGRAM(s) IV Push every 8 hours PRN Nausea and/or Vomiting      __________________________________________________  REVIEW OF SYSTEMS:    CONSTITUTIONAL: No fever,   EYES: no acute visual disturbances  NECK: No pain or stiffness  RESPIRATORY: No cough; No shortness of breath  CARDIOVASCULAR: No chest pain, no palpitations  GASTROINTESTINAL: No pain. No nausea or vomiting; No diarrhea   NEUROLOGICAL: No headache or numbness, no tremors  MUSCULOSKELETAL: No joint pain, no muscle pain  GENITOURINARY: no dysuria, no frequency, no hesitancy  PSYCHIATRY: no depression , no anxiety  ALL OTHER  ROS negative        Vital Signs Last 24 Hrs  T(C): 37.2 (02 May 2024 05:35), Max: 37.5 (01 May 2024 13:43)  T(F): 98.9 (02 May 2024 05:35), Max: 99.5 (01 May 2024 13:43)  HR: 95 (02 May 2024 05:35) (89 - 99)  BP: 100/61 (02 May 2024 06:08) (94/61 - 103/67)  BP(mean): 79 (01 May 2024 20:33) (79 - 79)  RR: 17 (02 May 2024 05:35) (16 - 18)  SpO2: 95% (02 May 2024 05:35) (95% - 97%)    Parameters below as of 02 May 2024 05:35  Patient On (Oxygen Delivery Method): room air        ________________________________________________  PHYSICAL EXAM:  GENERAL: NAD  HEENT: Normocephalic  NECK : supple  CHEST/LUNG: Clear to auscultation bilaterally  HEART: S1 S2  regular  ABDOMEN: Soft, Nontender, Nondistended; Bowel sounds present  EXTREMITIES: no edema  SKIN: warm and dry; no rash  NERVOUS SYSTEM:  Awake and alert; Oriented  to place, person and time    _________________________________________________  LABS:                        12.1   9.83  )-----------( 435      ( 02 May 2024 06:31 )             36.7     05-02    135  |  107  |  20<H>  ----------------------------<  162<H>  3.7   |  21<L>  |  1.64<H>    Ca    8.7      02 May 2024 06:31    TPro  7.4  /  Alb  2.4<L>  /  TBili  0.5  /  DBili  x   /  AST  53<H>  /  ALT  60  /  AlkPhos  176<H>  05-02      Urinalysis Basic - ( 02 May 2024 06:31 )    Color: x / Appearance: x / SG: x / pH: x  Gluc: 162 mg/dL / Ketone: x  / Bili: x / Urobili: x   Blood: x / Protein: x / Nitrite: x   Leuk Esterase: x / RBC: x / WBC x   Sq Epi: x / Non Sq Epi: x / Bacteria: x      CAPILLARY BLOOD GLUCOSE      POCT Blood Glucose.: 151 mg/dL (02 May 2024 11:20)  POCT Blood Glucose.: 134 mg/dL (02 May 2024 07:38)  POCT Blood Glucose.: 132 mg/dL (01 May 2024 21:07)  POCT Blood Glucose.: 113 mg/dL (01 May 2024 16:57)        RADIOLOGY & ADDITIONAL TESTS:    < from: CT Abdomen and Pelvis No Cont (04.27.24 @ 02:28) >  FINDINGS:  LOWER CHEST: Dependent atelectasis is appreciated. Right posterior medial   osteophyte lung is noted.    LIVER: Calcified right hepatic lobe granuloma. There is a lobulated left   hepatic lobe cyst.  BILE DUCTS: Normal caliber.  GALLBLADDER: Within normal limits.  SPLEEN: Within normal limits.  PANCREAS: Motion is seen in the region of the pancreatic head. Otherwise,   within normal limits.  ADRENALS: Within normal limits.  KIDNEYS/URETERS: There is left-sided nephroureteral stent, in   satisfactory position. There is increased left-sided perinephric   stranding when compared to the previous exam. 2 foci of air are seen   within the left upper pole kidney, which appear parenchymal. There are 2   adjacent stones seen within the left upper pole kidney which measure up   to 3 mm. There are at least 2 discrete stones of the right kidney which   measure up to 3 mm. Additional faint punctate stones may be present   bilaterally. There is a right renal cyst. Indeterminate 2 cm left lower   pole lesion is again appreciated. This would be better evaluated with   renal protocolMRI or CT.    BLADDER: Again appreciated is a thick-walled urinary bladder. Bladder   wall emphysema is again noted. There is a tiny focus of extraluminal air   laterally within the left pelvis. This is possibly associated with a   small vessel. Previously identified additional extra luminal foci of air   are no longer visualized. There is a 4 mm bladder stone within the left   posterior bladder. This likely corresponds to previously noted right   ureterovesicular junction stone.  REPRODUCTIVE ORGANS: Prostate gland is markedly enlarged. There is   nodular indentation of the posterior bladder.    BOWEL: No bowel obstruction. Appendix is normal. There are occasional   colonic diverticula. No acute diverticulitis.  PERITONEUM: No ascites.  VESSELS: Within normal limits.  RETROPERITONEUM/LYMPH NODES: No lymphadenopathy.  ABDOMINAL WALL: Within normal limits.  BONES: Degenerative changes.    IMPRESSION:  Increased left-sided perinephric stranding compared to the previous exam   suggestive of pyelonephritis. There are several foci of air seen at the   left upper pole kidney which appear parenchymal, raising possibility of   emphysematous pyelonephritis. Foci of extra luminal air seen on the   previous exam have diminished with a singular focus of air remaining.    Urinary bladder wall air are again appreciated overall decreased compared   to 4/25/2024 compatible with residua of emphysematous cystitis.    Indeterminate density left lower pole renal lesion for which renal   protocol MRI or CT is again advised.    Marked prostate enlargement. There is nodular indentation along the   posterior bladder wall.    < end of copied text >        < from: Xray Chest 1 View- PORTABLE-Urgent (04.26.24 @ 23:15) >  FINDINGS:    The lungs are clear. Cardiac silhouette is normal in size. Osseous   structures are normal.    IMPRESSION: No active pulmonary disease.    --- End of Report ---      < end of copied text >      Imaging Personally Reviewed:  YES    Consultant(s) Notes Reviewed:   YES      Plan of care was discussed with patient and /or primary care giver; all questions and concerns were addressed and care was aligned with patient's wishes.

## 2024-05-02 NOTE — PROCEDURE NOTE - NSICDXPROCEDURE_GEN_ALL_CORE_FT
PROCEDURES:  Insertion of intravenous catheter with ultrasound guidance 02-May-2024 16:10:49  Emily Kang

## 2024-05-02 NOTE — PROGRESS NOTE ADULT - PROBLEM SELECTOR PLAN 9
dispo home v snf for IV abx ( needs 14 days total per ID) Dispo plan  Home with home infusion  will placed Extended Dwell IV

## 2024-05-02 NOTE — PROGRESS NOTE ADULT - PROVIDER SPECIALTY LIST ADULT
Internal Medicine
Urology
0
Internal Medicine

## 2024-05-02 NOTE — CHART NOTE - NSCHARTNOTEFT_GEN_A_CORE
EVENT:   5/1/24, 11 pm, patient with emphysematous cystitis, Fallon catheter was discontinued. TOV. At 11pm, bladder scan showed 180 ml residual urine. Next bladder scan  in 5am.   HPI:  67 y/o male PMH DM presenting to the ED due to fevers and chills. Pt states that he recently had a L ureteroscopy with stent placement on 4/12 with Dr. Evans.  After he was discharged has having intermittent fevers. He had a follow up appointment with Dr. Evans on 4/18 and was empirically started on Cipro. His outpatient urine cultures were positive for ESBL and his Abx.  were switched to Augmentin on 4/23. He had another appointment outpatient yesterday and was advised to have a Fallon catheter placed after his CT showed emphysematous cystitis. Last night, he had another fever of 102F and continued having left flank pain, non radiating, sharp, and intermittent. His daughter also reports that he has been having decreased appetite  OBJECTIVE:  Vital Signs Last 24 Hrs  T(C): 36.8 (01 May 2024 20:33), Max: 37.5 (01 May 2024 13:43)  T(F): 98.2 (01 May 2024 20:33), Max: 99.5 (01 May 2024 13:43)  HR: 89 (01 May 2024 20:33) (89 - 107)  BP: 103/67 (01 May 2024 20:33) (100/68 - 103/67)  BP(mean): 79 (01 May 2024 20:33) (79 - 79)  RR: 18 (01 May 2024 20:33) (16 - 18)  SpO2: 97% (01 May 2024 20:33) (95% - 97%)    Parameters below as of 01 May 2024 20:33  Patient On (Oxygen Delivery Method): room air        FOCUSED PHYSICAL EXAM:    LABS:                        12.1   12.34 )-----------( 480      ( 01 May 2024 05:50 )             36.5     05-01    135  |  105  |  18  ----------------------------<  145<H>  3.9   |  21<L>  |  1.59<H>    Ca    8.4      01 May 2024 05:50  Phos  3.0     04-30  Mg     2.1     04-30    TPro  7.5  /  Alb  2.3<L>  /  TBili  0.7  /  DBili  x   /  AST  45<H>  /  ALT  49  /  AlkPhos  155<H>  05-01    PLAN:   - Monitor I & O,   - Maintain safety measures,   - Continue supportive care and treatment. EVENT:   5/1/24, 11 pm, patient with emphysematous cystitis, Fallon catheter was discontinued. TOV. At 11pm, bladder scan showed 180 ml residual urine. Next bladder scan  at 6 am showed 250 ml residual urine.    HPI:  67 y/o male PMH DM presenting to the ED due to fevers and chills. Pt states that he recently had a L ureteroscopy with stent placement on 4/12 with Dr. Evans.  After he was discharged has having intermittent fevers. He had a follow up appointment with Dr. Evans on 4/18 and was empirically started on Cipro. His outpatient urine cultures were positive for ESBL and his Abx.  were switched to Augmentin on 4/23. He had another appointment outpatient yesterday and was advised to have a Fallon catheter placed after his CT showed emphysematous cystitis. Last night, he had another fever of 102F and continued having left flank pain, non radiating, sharp, and intermittent. His daughter also reports that he has been having decreased appetite  OBJECTIVE:  Vital Signs Last 24 Hrs  T(C): 36.8 (01 May 2024 20:33), Max: 37.5 (01 May 2024 13:43)  T(F): 98.2 (01 May 2024 20:33), Max: 99.5 (01 May 2024 13:43)  HR: 89 (01 May 2024 20:33) (89 - 107)  BP: 103/67 (01 May 2024 20:33) (100/68 - 103/67)  BP(mean): 79 (01 May 2024 20:33) (79 - 79)  RR: 18 (01 May 2024 20:33) (16 - 18)  SpO2: 97% (01 May 2024 20:33) (95% - 97%)    Parameters below as of 01 May 2024 20:33  Patient On (Oxygen Delivery Method): room air        FOCUSED PHYSICAL EXAM:    LABS:                        12.1   12.34 )-----------( 480      ( 01 May 2024 05:50 )             36.5     05-01    135  |  105  |  18  ----------------------------<  145<H>  3.9   |  21<L>  |  1.59<H>    Ca    8.4      01 May 2024 05:50  Phos  3.0     04-30  Mg     2.1     04-30    TPro  7.5  /  Alb  2.3<L>  /  TBili  0.7  /  DBili  x   /  AST  45<H>  /  ALT  49  /  AlkPhos  155<H>  05-01    PLAN:   - Monitor I & O,   - Maintain safety measures,   - Continue supportive care and treatment.

## 2024-05-02 NOTE — PROGRESS NOTE ADULT - TIME BILLING
Review of prior charts, review of imaging and labs. Discussion with patient and family member. Coordination of care with specialists. Medication adjustment and management, needs monitoring. Severe sepsis causing harm to life or function if not treated
Review of prior charts, review of imaging and labs. with safia's grand daughter. Coordination of care with specialists, discussion with ID attending and urology physician. Medication adjustment and management, needs monitoring. Severe sepsis causing harm to life or function if not treated Formulation of plan, documentation of encounter. Coordination of care with: social work, case management, midline team
Review of prior charts, review of imaging and labs. Discussion with patient via . Coordination of care with specialists, discussion with ID attending. Medication adjustment and management, needs monitoring. Severe sepsis causing harm to life or function if not treated Formulation of plan, documentation of encounter

## 2024-05-03 ENCOUNTER — TRANSCRIPTION ENCOUNTER (OUTPATIENT)
Age: 69
End: 2024-05-03

## 2024-05-03 VITALS
HEART RATE: 97 BPM | TEMPERATURE: 98 F | RESPIRATION RATE: 16 BRPM | OXYGEN SATURATION: 97 % | SYSTOLIC BLOOD PRESSURE: 102 MMHG | DIASTOLIC BLOOD PRESSURE: 69 MMHG

## 2024-05-03 LAB
ALBUMIN SERPL ELPH-MCNC: 2.5 G/DL — LOW (ref 3.5–5)
ALP SERPL-CCNC: 162 U/L — HIGH (ref 40–120)
ALT FLD-CCNC: 68 U/L DA — HIGH (ref 10–60)
ANION GAP SERPL CALC-SCNC: 5 MMOL/L — SIGNIFICANT CHANGE UP (ref 5–17)
AST SERPL-CCNC: 67 U/L — HIGH (ref 10–40)
BILIRUB SERPL-MCNC: 0.4 MG/DL — SIGNIFICANT CHANGE UP (ref 0.2–1.2)
BUN SERPL-MCNC: 20 MG/DL — HIGH (ref 7–18)
CALCIUM SERPL-MCNC: 8.1 MG/DL — LOW (ref 8.4–10.5)
CHLORIDE SERPL-SCNC: 105 MMOL/L — SIGNIFICANT CHANGE UP (ref 96–108)
CO2 SERPL-SCNC: 25 MMOL/L — SIGNIFICANT CHANGE UP (ref 22–31)
CREAT SERPL-MCNC: 1.54 MG/DL — HIGH (ref 0.5–1.3)
EGFR: 49 ML/MIN/1.73M2 — LOW
GLUCOSE BLDC GLUCOMTR-MCNC: 121 MG/DL — HIGH (ref 70–99)
GLUCOSE BLDC GLUCOMTR-MCNC: 138 MG/DL — HIGH (ref 70–99)
GLUCOSE SERPL-MCNC: 119 MG/DL — HIGH (ref 70–99)
POTASSIUM SERPL-MCNC: 4.1 MMOL/L — SIGNIFICANT CHANGE UP (ref 3.5–5.3)
POTASSIUM SERPL-SCNC: 4.1 MMOL/L — SIGNIFICANT CHANGE UP (ref 3.5–5.3)
PROT SERPL-MCNC: 8.1 G/DL — SIGNIFICANT CHANGE UP (ref 6–8.3)
SODIUM SERPL-SCNC: 135 MMOL/L — SIGNIFICANT CHANGE UP (ref 135–145)

## 2024-05-03 PROCEDURE — 87150 DNA/RNA AMPLIFIED PROBE: CPT

## 2024-05-03 PROCEDURE — T1013: CPT

## 2024-05-03 PROCEDURE — 74176 CT ABD & PELVIS W/O CONTRAST: CPT | Mod: MC

## 2024-05-03 PROCEDURE — 84295 ASSAY OF SERUM SODIUM: CPT

## 2024-05-03 PROCEDURE — 85730 THROMBOPLASTIN TIME PARTIAL: CPT

## 2024-05-03 PROCEDURE — 84132 ASSAY OF SERUM POTASSIUM: CPT

## 2024-05-03 PROCEDURE — 85652 RBC SED RATE AUTOMATED: CPT

## 2024-05-03 PROCEDURE — 84100 ASSAY OF PHOSPHORUS: CPT

## 2024-05-03 PROCEDURE — 93005 ELECTROCARDIOGRAM TRACING: CPT

## 2024-05-03 PROCEDURE — 99285 EMERGENCY DEPT VISIT HI MDM: CPT

## 2024-05-03 PROCEDURE — 82962 GLUCOSE BLOOD TEST: CPT

## 2024-05-03 PROCEDURE — 36415 COLL VENOUS BLD VENIPUNCTURE: CPT

## 2024-05-03 PROCEDURE — 83735 ASSAY OF MAGNESIUM: CPT

## 2024-05-03 PROCEDURE — 87077 CULTURE AEROBIC IDENTIFY: CPT

## 2024-05-03 PROCEDURE — 81001 URINALYSIS AUTO W/SCOPE: CPT

## 2024-05-03 PROCEDURE — 80053 COMPREHEN METABOLIC PANEL: CPT

## 2024-05-03 PROCEDURE — 82330 ASSAY OF CALCIUM: CPT

## 2024-05-03 PROCEDURE — 85027 COMPLETE CBC AUTOMATED: CPT

## 2024-05-03 PROCEDURE — 85610 PROTHROMBIN TIME: CPT

## 2024-05-03 PROCEDURE — 85025 COMPLETE CBC W/AUTO DIFF WBC: CPT

## 2024-05-03 PROCEDURE — 71045 X-RAY EXAM CHEST 1 VIEW: CPT

## 2024-05-03 PROCEDURE — 82803 BLOOD GASES ANY COMBINATION: CPT

## 2024-05-03 PROCEDURE — 80048 BASIC METABOLIC PNL TOTAL CA: CPT

## 2024-05-03 PROCEDURE — 82040 ASSAY OF SERUM ALBUMIN: CPT

## 2024-05-03 PROCEDURE — 83605 ASSAY OF LACTIC ACID: CPT

## 2024-05-03 PROCEDURE — 87040 BLOOD CULTURE FOR BACTERIA: CPT

## 2024-05-03 PROCEDURE — 86140 C-REACTIVE PROTEIN: CPT

## 2024-05-03 PROCEDURE — 99239 HOSP IP/OBS DSCHRG MGMT >30: CPT

## 2024-05-03 PROCEDURE — 87186 SC STD MICRODIL/AGAR DIL: CPT

## 2024-05-03 PROCEDURE — 87086 URINE CULTURE/COLONY COUNT: CPT

## 2024-05-03 RX ORDER — ERTAPENEM SODIUM 1 G/1
1 INJECTION, POWDER, LYOPHILIZED, FOR SOLUTION INTRAMUSCULAR; INTRAVENOUS
Qty: 30 | Refills: 0
Start: 2024-05-03 | End: 2024-06-01

## 2024-05-03 RX ORDER — TAMSULOSIN HYDROCHLORIDE 0.4 MG/1
2 CAPSULE ORAL
Qty: 0 | Refills: 0 | DISCHARGE
Start: 2024-05-03

## 2024-05-03 RX ADMIN — ERTAPENEM SODIUM 120 MILLIGRAM(S): 1 INJECTION, POWDER, LYOPHILIZED, FOR SOLUTION INTRAMUSCULAR; INTRAVENOUS at 11:38

## 2024-05-03 RX ADMIN — HEPARIN SODIUM 5000 UNIT(S): 5000 INJECTION INTRAVENOUS; SUBCUTANEOUS at 05:59

## 2024-05-03 RX ADMIN — HEPARIN SODIUM 5000 UNIT(S): 5000 INJECTION INTRAVENOUS; SUBCUTANEOUS at 13:27

## 2024-05-03 NOTE — CHART NOTE - NSCHARTNOTEFT_GEN_A_CORE
Spoke with Dr. Ted Crews directly. Informed her of patient's recent medical history including stent, esbl ecoli bacteremia, home infusions. Dr. Crews will set an appointment in 2 weeks for followup after home infusion completed. Xeljanz Counseling: I discussed with the patient the risks of Xeljanz therapy including increased risk of infection, liver issues, headache, diarrhea, or cold symptoms. Live vaccines should be avoided. They were instructed to call if they have any problems.

## 2024-05-03 NOTE — DISCHARGE NOTE NURSING/CASE MANAGEMENT/SOCIAL WORK - PATIENT PORTAL LINK FT
You can access the FollowMyHealth Patient Portal offered by [facility] by registering at the following website: [email or web address]. By joining [facility]’s FollowMyHealth portal, you will also be able to view your health information using other applications (apps) compatible with our system.

## 2024-05-03 NOTE — DISCHARGE NOTE NURSING/CASE MANAGEMENT/SOCIAL WORK - NSDCPEFALRISK_GEN_ALL_CORE
For information on Fall & Injury Prevention, visit: [email or web address] OR  [email or web address] OR  https://www.cdc.gov/steadi/patient.html

## 2024-05-04 LAB
CULTURE RESULTS: SIGNIFICANT CHANGE UP
CULTURE RESULTS: SIGNIFICANT CHANGE UP
SPECIMEN SOURCE: SIGNIFICANT CHANGE UP
SPECIMEN SOURCE: SIGNIFICANT CHANGE UP

## 2024-05-10 ENCOUNTER — APPOINTMENT (OUTPATIENT)
Dept: UROLOGY | Facility: CLINIC | Age: 69
End: 2024-05-10
Payer: MEDICARE

## 2024-05-10 ENCOUNTER — APPOINTMENT (OUTPATIENT)
Dept: UROLOGY | Facility: CLINIC | Age: 69
End: 2024-05-10

## 2024-05-10 VITALS
SYSTOLIC BLOOD PRESSURE: 124 MMHG | WEIGHT: 137 LBS | OXYGEN SATURATION: 99 % | HEIGHT: 63 IN | DIASTOLIC BLOOD PRESSURE: 80 MMHG | HEART RATE: 99 BPM | TEMPERATURE: 97.9 F | BODY MASS INDEX: 24.27 KG/M2

## 2024-05-10 PROCEDURE — 52310 CYSTOSCOPY AND TREATMENT: CPT

## 2024-06-17 ENCOUNTER — APPOINTMENT (OUTPATIENT)
Dept: UROLOGY | Facility: CLINIC | Age: 69
End: 2024-06-17
Payer: MEDICARE

## 2024-06-17 VITALS
TEMPERATURE: 96.7 F | SYSTOLIC BLOOD PRESSURE: 125 MMHG | HEART RATE: 80 BPM | HEIGHT: 63 IN | OXYGEN SATURATION: 98 % | WEIGHT: 138 LBS | BODY MASS INDEX: 24.45 KG/M2 | DIASTOLIC BLOOD PRESSURE: 82 MMHG

## 2024-06-17 DIAGNOSIS — N40.0 BENIGN PROSTATIC HYPERPLASIA WITHOUT LOWER URINARY TRACT SYMPMS: ICD-10-CM

## 2024-06-17 DIAGNOSIS — N20.0 CALCULUS OF KIDNEY: ICD-10-CM

## 2024-06-17 PROCEDURE — 99214 OFFICE O/P EST MOD 30 MIN: CPT

## 2024-06-17 PROCEDURE — 76775 US EXAM ABDO BACK WALL LIM: CPT

## 2024-06-17 PROCEDURE — G2211 COMPLEX E/M VISIT ADD ON: CPT

## 2024-06-17 RX ORDER — FINASTERIDE 5 MG/1
5 TABLET, FILM COATED ORAL
Qty: 90 | Refills: 3 | Status: ACTIVE | COMMUNITY
Start: 2024-06-17 | End: 1900-01-01

## 2024-06-17 NOTE — ASSESSMENT
[FreeTextEntry1] : 69 yo male with bph and nephrolithiasis.      Plan PVR done today 50 ml demonstrating patient emptying bladder Dietary modifications for stone prevention discussed Urinalysis reviewed demonstrating 84 RBC per high-power field however patient with UTI we will repeat at follow-up  I have reviewed images of patient's renal us and discussed results with patient demonstrating no hydronephrosis or stones Continue tamsulosin 0.4 mg bid Start finasteride 5 mg daily: prescription sent FU in 6 months for PVR    Patient is being seen today for evaluation and management of a chronic and longitudinal ongoing condition and I am of the primary treating physician.

## 2024-06-17 NOTE — HISTORY OF PRESENT ILLNESS
[FreeTextEntry1] : 68-year-old male with BPH and recurrent nephrolithiasis recently admitted for severe pain for an obstructing ureteral stone.  He underwent ureteroscopy with stent insertion.  He also has an enlarged prostate and required Ahmadi catheter following the procedure.  He had his tamsulosin increased to 0.8 mg daily and was able to void with a PVR of approximately 180 mL while in the hospital.  Post operatively he continued to have fevers with ucx growing ESBL E coli. CT scan demonstrated emphysematous cystitis requiring ahmadi and IV abx. He passed TOV with PVR of 0 ml.   He is here today for renal us.   PVR today: 50 ml

## 2024-11-15 ENCOUNTER — EMERGENCY (EMERGENCY)
Facility: HOSPITAL | Age: 69
LOS: 1 days | Discharge: HOME | End: 2024-11-15
Attending: EMERGENCY MEDICINE | Admitting: EMERGENCY MEDICINE
Payer: MEDICARE

## 2024-11-15 VITALS
OXYGEN SATURATION: 97 % | HEART RATE: 101 BPM | RESPIRATION RATE: 16 BRPM | DIASTOLIC BLOOD PRESSURE: 72 MMHG | SYSTOLIC BLOOD PRESSURE: 119 MMHG | TEMPERATURE: 98 F

## 2024-11-15 VITALS
SYSTOLIC BLOOD PRESSURE: 121 MMHG | TEMPERATURE: 98 F | OXYGEN SATURATION: 97 % | DIASTOLIC BLOOD PRESSURE: 81 MMHG | HEART RATE: 88 BPM | RESPIRATION RATE: 18 BRPM

## 2024-11-15 LAB
ALBUMIN SERPL ELPH-MCNC: 3.9 G/DL — SIGNIFICANT CHANGE UP (ref 3.3–5)
ALP SERPL-CCNC: 222 U/L — HIGH (ref 40–120)
ALT FLD-CCNC: 84 U/L — HIGH (ref 4–41)
ANION GAP SERPL CALC-SCNC: 16 MMOL/L — HIGH (ref 7–14)
APPEARANCE UR: ABNORMAL
AST SERPL-CCNC: 36 U/L — SIGNIFICANT CHANGE UP (ref 4–40)
B-OH-BUTYR SERPL-SCNC: 0.2 MMOL/L — SIGNIFICANT CHANGE UP (ref 0–0.4)
BACTERIA # UR AUTO: NEGATIVE /HPF — SIGNIFICANT CHANGE UP
BASOPHILS # BLD AUTO: 0.03 K/UL — SIGNIFICANT CHANGE UP (ref 0–0.2)
BASOPHILS NFR BLD AUTO: 0.4 % — SIGNIFICANT CHANGE UP (ref 0–2)
BILIRUB SERPL-MCNC: 0.7 MG/DL — SIGNIFICANT CHANGE UP (ref 0.2–1.2)
BILIRUB UR-MCNC: NEGATIVE — SIGNIFICANT CHANGE UP
BUN SERPL-MCNC: 21 MG/DL — SIGNIFICANT CHANGE UP (ref 7–23)
CALCIUM SERPL-MCNC: 9.2 MG/DL — SIGNIFICANT CHANGE UP (ref 8.4–10.5)
CAST: 2 /LPF — SIGNIFICANT CHANGE UP (ref 0–4)
CHLORIDE SERPL-SCNC: 104 MMOL/L — SIGNIFICANT CHANGE UP (ref 98–107)
CO2 SERPL-SCNC: 17 MMOL/L — LOW (ref 22–31)
COLOR SPEC: YELLOW — SIGNIFICANT CHANGE UP
CREAT SERPL-MCNC: 1.45 MG/DL — HIGH (ref 0.5–1.3)
DIFF PNL FLD: NEGATIVE — SIGNIFICANT CHANGE UP
EGFR: 52 ML/MIN/1.73M2 — LOW
EOSINOPHIL # BLD AUTO: 0.27 K/UL — SIGNIFICANT CHANGE UP (ref 0–0.5)
EOSINOPHIL NFR BLD AUTO: 3.4 % — SIGNIFICANT CHANGE UP (ref 0–6)
FLUAV AG NPH QL: SIGNIFICANT CHANGE UP
FLUBV AG NPH QL: SIGNIFICANT CHANGE UP
GAS PNL BLDV: SIGNIFICANT CHANGE UP
GLUCOSE SERPL-MCNC: 382 MG/DL — HIGH (ref 70–99)
GLUCOSE UR QL: >=1000 MG/DL
HCT VFR BLD CALC: 46.6 % — SIGNIFICANT CHANGE UP (ref 39–50)
HGB BLD-MCNC: 15.3 G/DL — SIGNIFICANT CHANGE UP (ref 13–17)
IANC: 4.27 K/UL — SIGNIFICANT CHANGE UP (ref 1.8–7.4)
IMM GRANULOCYTES NFR BLD AUTO: 0.6 % — SIGNIFICANT CHANGE UP (ref 0–0.9)
KETONES UR-MCNC: NEGATIVE MG/DL — SIGNIFICANT CHANGE UP
LEUKOCYTE ESTERASE UR-ACNC: ABNORMAL
LYMPHOCYTES # BLD AUTO: 2.66 K/UL — SIGNIFICANT CHANGE UP (ref 1–3.3)
LYMPHOCYTES # BLD AUTO: 33.8 % — SIGNIFICANT CHANGE UP (ref 13–44)
MAGNESIUM SERPL-MCNC: 2.2 MG/DL — SIGNIFICANT CHANGE UP (ref 1.6–2.6)
MCHC RBC-ENTMCNC: 29.5 PG — SIGNIFICANT CHANGE UP (ref 27–34)
MCHC RBC-ENTMCNC: 32.8 G/DL — SIGNIFICANT CHANGE UP (ref 32–36)
MCV RBC AUTO: 90 FL — SIGNIFICANT CHANGE UP (ref 80–100)
MONOCYTES # BLD AUTO: 0.58 K/UL — SIGNIFICANT CHANGE UP (ref 0–0.9)
MONOCYTES NFR BLD AUTO: 7.4 % — SIGNIFICANT CHANGE UP (ref 2–14)
NEUTROPHILS # BLD AUTO: 4.27 K/UL — SIGNIFICANT CHANGE UP (ref 1.8–7.4)
NEUTROPHILS NFR BLD AUTO: 54.4 % — SIGNIFICANT CHANGE UP (ref 43–77)
NITRITE UR-MCNC: NEGATIVE — SIGNIFICANT CHANGE UP
NRBC # BLD: 0 /100 WBCS — SIGNIFICANT CHANGE UP (ref 0–0)
NRBC # FLD: 0 K/UL — SIGNIFICANT CHANGE UP (ref 0–0)
PH UR: 6 — SIGNIFICANT CHANGE UP (ref 5–8)
PLATELET # BLD AUTO: 188 K/UL — SIGNIFICANT CHANGE UP (ref 150–400)
POTASSIUM SERPL-MCNC: 4.2 MMOL/L — SIGNIFICANT CHANGE UP (ref 3.5–5.3)
POTASSIUM SERPL-SCNC: 4.2 MMOL/L — SIGNIFICANT CHANGE UP (ref 3.5–5.3)
PROT SERPL-MCNC: 7.8 G/DL — SIGNIFICANT CHANGE UP (ref 6–8.3)
PROT UR-MCNC: NEGATIVE MG/DL — SIGNIFICANT CHANGE UP
RBC # BLD: 5.18 M/UL — SIGNIFICANT CHANGE UP (ref 4.2–5.8)
RBC # FLD: 13.4 % — SIGNIFICANT CHANGE UP (ref 10.3–14.5)
RBC CASTS # UR COMP ASSIST: 1 /HPF — SIGNIFICANT CHANGE UP (ref 0–4)
REVIEW: SIGNIFICANT CHANGE UP
RSV RNA NPH QL NAA+NON-PROBE: SIGNIFICANT CHANGE UP
SARS-COV-2 RNA SPEC QL NAA+PROBE: SIGNIFICANT CHANGE UP
SODIUM SERPL-SCNC: 137 MMOL/L — SIGNIFICANT CHANGE UP (ref 135–145)
SP GR SPEC: 1.03 — HIGH (ref 1–1.03)
SQUAMOUS # UR AUTO: 0 /HPF — SIGNIFICANT CHANGE UP (ref 0–5)
UROBILINOGEN FLD QL: 0.2 MG/DL — SIGNIFICANT CHANGE UP (ref 0.2–1)
WBC # BLD: 7.86 K/UL — SIGNIFICANT CHANGE UP (ref 3.8–10.5)
WBC # FLD AUTO: 7.86 K/UL — SIGNIFICANT CHANGE UP (ref 3.8–10.5)
WBC UR QL: 117 /HPF — HIGH (ref 0–5)
YEAST-LIKE CELLS: PRESENT

## 2024-11-15 PROCEDURE — 71046 X-RAY EXAM CHEST 2 VIEWS: CPT | Mod: 26

## 2024-11-15 PROCEDURE — 99284 EMERGENCY DEPT VISIT MOD MDM: CPT

## 2024-11-15 RX ORDER — SODIUM CHLORIDE 9 MG/ML
1000 INJECTION, SOLUTION INTRAMUSCULAR; INTRAVENOUS; SUBCUTANEOUS ONCE
Refills: 0 | Status: COMPLETED | OUTPATIENT
Start: 2024-11-15 | End: 2024-11-15

## 2024-11-15 RX ADMIN — SODIUM CHLORIDE 1000 MILLILITER(S): 9 INJECTION, SOLUTION INTRAMUSCULAR; INTRAVENOUS; SUBCUTANEOUS at 20:31

## 2024-11-15 NOTE — ED PROVIDER NOTE - PATIENT PORTAL LINK FT
You can access the FollowMyHealth Patient Portal offered by [facility] by registering at the following website: [email or web address]. By joining [facility]’s FollowMyHealth portal, you will also be able to view your health information using other applications (apps) compatible with our system.

## 2024-11-15 NOTE — ED ADULT NURSE NOTE - OBJECTIVE STATEMENT
Pt received ,alert and oriented x4, able to move all extremities and follow commands. C/O fevers and arm weakness. Denies headache, dizziness, chest pain, sob,  n/v/d. Breathing is equal and unlabored on room air. 20g wally placed to right forearm, labs drawn and sent. Pending dispo.

## 2024-11-15 NOTE — ED PROVIDER NOTE - PROGRESS NOTE DETAILS
David Rick (EM/IM PGY-1). Vital signs stable. Patient is comfortably resting in stretcher. CXR negative for PNA. RVP negative. Glucose 383 on CMP, will give 1 L NS and reassess. David Rick (EM/IM PGY-1). Vital signs stable. . Patient stable for discharge.

## 2024-11-15 NOTE — ED PROVIDER NOTE - CLINICAL SUMMARY MEDICAL DECISION MAKING FREE TEXT BOX
Lesson VI: Social Support    Diagnosis: cocaine and alcohol use disorder, severe    Recovery Preparedness/ Recovery Prevention-Part I  Goal: Patient will be able to apply, practice and process insights and skills learned from previous last treatment session(s)  into real life situations while outside of the treatment setting.  “What worked?”, “What did not work?” “How can you apply today’s programming to adjust and improve recovery insights and skills to persevere towards your recovery goals?”   Start Time: 0900    End Time: 0935   # in attendance: 6  Method: Group  Attendance: Present  Participation: Active  Response/Communication: Appropriate Topic and Return Demonstration  Behavior Socialization: Appropriate to group and Provided feedback to peer    Recovery Preparedness /Recovery Prevention-Part II  Goal Patient’s will identify insights and skills learned in treatment today and how they will incorporate these while at home and individuation towards personal treatment plan and preparation towards recovery lifestyle and prevention of relapse behaviors. : “What did I learn new today?”, “What danger’s to my recover might I encounter until next programming that I can disc with peers and therapist for help?”, “How can Pt. apply today’s programming to adjust and improve recovery insights and skills to persevere towards recovery goals.”   Start Time: 1135   End Time: 1200   # in attendance: 6  Method: Group  Attendance: Present  Participation: Active  Response/Communication: Appropriate Topic and Return Demonstration  Behavior Socialization: Appropriate to group and Provided feedback to peer    Recovery Skills/ Skills Training   Focus: Positive social supports increase chances of success in recovery. Being around people who are making changes similar to ourselves provide us with relationships that are helpful. Being around others who are similar help us not feel so alone. Being around others will provide us positive  HPI:  70 y/o M w/ PMHx of DM2 and HLD who presents with 1 week of subjective fevers. Also has associated body aches, cough, lightheadedness, and generally feeling weak. Sick contact at home wife with similar symptoms, diagnosed with influenza. Also with subjective RAMOS but not worsened with activity and without chest pain. Today, he had 2-3 loose bowel movements but no nausea, vomiting or diarrhea. Also reports his sugars at home have been high 360-370s with increased urinary frequency but no dysuria or hematuria.    ROS:  Denies any headache, dizziness, nasal congestion, constipation, or hematochezia/melena.      FHx/SHx:   Non-contributory.    PE:   Vital signs reviewed.  GENERAL: No acute distress, non toxic-appearing  HEAD: Atraumatic, normocephalic  EARS: Externally normal, atraumatic  EYES: No jaundice, not injected, no rupture, no foreign bodies  MOUTH: Moist mucous membranes  NECK: No swelling, no lymphadenopathy  HEART: Regular rate and rhythm, normal S1/S2, no murmurs, no rubs, no gallops  LUNGS: Clear to auscultation bilaterally without rhonchi, rales, or wheezing  ABDOMEN: Soft, non-distended, and non tender in all 4 quadrants  EXTREMITIES: No gross deformities  VASCULAR: Pulses palpable in all extremities, no pitting edema, capillary refill <2 secs  SKIN: Grossly intact without rash or open wounds  PSYCH: Alert and oriented x 3  NEURO: Strength 5/5 and sensation intact in all 4 extremities.     A/P:   70 y/o M w/ PMHx as above who presents with viral syndrome-like symptoms. Low concern for PNA however given his age and comorbidities will evaluate. Vitals stable on triage, not hypoxic or hypotensive. FS on triage 380s.  - CBC, CMP, Mg, VBG  - BHB  - RVP, CXR, UA w/ reflex culture  - if all negative likely D/C feedback needed for our emotional and spiritual growth. Other will hold us accountable for our actions. Will discuss helping relationships, asking for help is not a weakness, and where to find help within their own family and group of friends. Pt denied suicidal ideation and intention. Pt denied homicidal ideation and intention. Pt was evidenced without experiencing auditory or visual hallucinations. Pt was oriented to time, place, and person.    Educational Aides: List of on campus meetings  Start Time: 0935    End Time: 1035   # in attendance: 6  Topic:  social support   Method: Group  Attendance: Present  Participation: Active  Response/Communication: Appropriate Topic and Return Demonstration  Behavior Socialization: Appropriate to group  Plan: Improve readiness      Process Group Notes   Start Time: 1035  End Time: 1135  # in attendance: 6    Pt talked about how he had a great weekend. Pt said that he went back to cooking, which felt really good. Pt said that he has not cooked in a long time. Pt mentioned that he is feeling the best he has ever felt. Pt said that he worries that he may be in the honeymoon stage. Pt and the group talked about 12 step meetings to maintain sobriety. Pt said that he plans to go to a 12 step group this evening. Pt appeared gleeful as well as with diminished anxious distress. Pt said that he is persistent with trying 12 step meetings even though he had a bad experience at his first meeting. Pt said that he is persistent because he knows he needs to attend them moving forward. Pt said that he is willing to try to stop marijuana for a week.   Mood/Affect: Appropriate    Safety Concerns:   None    Drug of Choice: Alcohol  Cocaine  Assessment of Stages of Change:Determination     Use / Relapse of Street Drugs or Alcohol:  No    Taking Medications as Prescribed:  Yes  Comments: none    Treatment Plan: Continue current Treatment Plan    Initial Note to Include  Post Discharge Plan: OP at  TBD  Anticipated Discharge Date: 6.19.18    Carolina Mccormick LPC, CSAC, [facility]  6/11/2018

## 2024-11-15 NOTE — ED PROVIDER NOTE - NSFOLLOWUPINSTRUCTIONS_ED_ALL_ED_FT
You were seen in the emergency department for fever. We have evaluated you and determined that you do not require further hospital interventions.    During your stay you had the following relevant results: your blood tests were not concerning for any serious infections but your sugars were high. We gave you fluids through your IV and your sugars decreased appropriately, please continue to take your diabetes medications as directed.    Please follow up with your primary care provider as necessary to discuss the results of your stay in our department.    If you start to experience worsening symptoms such as difficulty breathing, high fevers, confusion/lethargy, chest pain, or other concerning symptoms, please return to the emergency department for further evaluation.

## 2024-11-15 NOTE — ED PROVIDER NOTE - ATTENDING CONTRIBUTION TO CARE
Brief HPI:  69-year-old male past medical history of non-insulin-dependent diabetes type 2, hyperlipidemia presents with 1 week of fever, fatigue, myalgia located mostly in the bilateral arms.  Reports sick contact.  States he has been having elevated blood sugar.  Denies cough, shortness of breath, chest pain, nausea, vomiting, abdominal pain, diarrhea, dysuria.  Recent travel to [facility].    Vitals:   Reviewed    Exam:    GEN:  Non-toxic appearing, non-distressed, speaking full sentences, non-diaphoretic, AAOx3  HEENT:  NCAT, neck supple, EOMI, PERRLA, sclera anicteric, no conjunctival pallor or injection, no stridor, normal voice, no tonsillar exudate, uvula midline  CV:  regular rhythm and rate, s1/s2 audible, no murmurs, rubs or gallops, peripheral pulses 2+ and symmetric  PULM:  non-labored respirations, lungs clear to auscultation bilaterally, no wheezes, crackles or rales  ABD:  non distended, non-tender, no rebound, no guarding, negative Leblanc's sign, bowel sounds normal, no cvat  MSK:  no gross deformity, non-tender extremities and joints, range of motion grossly normal appearing, no extremity edema, extremities warm and well perfused   NEURO:  AAOx3, CN II-XII intact, motor 5/5 in upper and lower extremities bilaterally, sensation grossly intact in extremities and trunk, finger to nose testing wnl, no nystagmus, negative Romberg, no pronator drift, no gait deficit  SKIN:  warm, dry, no rash or vesicles     A/P:  69-year-old male past medical history of non-insulin-dependent diabetes type 2, hyperlipidemia presents with 1 week of fever, fatigue, myalgia located mostly in the bilateral arms.  Suspect viral syndrome.  Hyperglycemic.  Will send labs to evaluate for DKA, chest x-ray, supportive care.  Disposition pending.

## 2024-11-17 LAB
CULTURE RESULTS: ABNORMAL
SPECIMEN SOURCE: SIGNIFICANT CHANGE UP

## 2024-11-19 RX ORDER — FLUCONAZOLE, SODIUM CHLORIDE 2 MG/ML
1 INJECTION INTRAVENOUS
Qty: 1 | Refills: 0
Start: 2024-11-19

## 2024-12-04 NOTE — PATIENT PROFILE ADULT - FALL HARM RISK - CONCLUSION
Biometrics completed.    Results reviewed with a Registered Nurse; understanding of results and   educational materials was verbalized.  
Fall Risk

## 2024-12-19 ENCOUNTER — APPOINTMENT (OUTPATIENT)
Dept: UROLOGY | Facility: CLINIC | Age: 69
End: 2024-12-19
Payer: MEDICARE

## 2024-12-19 VITALS
TEMPERATURE: 97.4 F | HEART RATE: 100 BPM | OXYGEN SATURATION: 98 % | DIASTOLIC BLOOD PRESSURE: 74 MMHG | SYSTOLIC BLOOD PRESSURE: 122 MMHG | BODY MASS INDEX: 24.8 KG/M2 | WEIGHT: 140 LBS | HEIGHT: 63 IN

## 2024-12-19 DIAGNOSIS — N40.0 BENIGN PROSTATIC HYPERPLASIA WITHOUT LOWER URINARY TRACT SYMPMS: ICD-10-CM

## 2024-12-19 PROCEDURE — G2211 COMPLEX E/M VISIT ADD ON: CPT

## 2024-12-19 PROCEDURE — 99214 OFFICE O/P EST MOD 30 MIN: CPT

## 2024-12-20 LAB
APPEARANCE: ABNORMAL
BACTERIA: NEGATIVE /HPF
BILIRUBIN URINE: NEGATIVE
BLOOD URINE: ABNORMAL
CAST: 0 /LPF
COLOR: YELLOW
EPITHELIAL CELLS: 0 /HPF
GLUCOSE QUALITATIVE U: >=1000 MG/DL
KETONES URINE: NEGATIVE MG/DL
LEUKOCYTE ESTERASE URINE: ABNORMAL
MICROSCOPIC-UA: NORMAL
NITRITE URINE: NEGATIVE
PH URINE: 6
PROTEIN URINE: 30 MG/DL
PSA FREE FLD-MCNC: 33 %
PSA FREE SERPL-MCNC: 0.22 NG/ML
PSA SERPL-MCNC: 0.68 NG/ML
RED BLOOD CELLS URINE: 9 /HPF
REVIEW: NORMAL
SPECIFIC GRAVITY URINE: 1.03
UROBILINOGEN URINE: 0.2 MG/DL
WHITE BLOOD CELLS URINE: 788 /HPF
YEAST-LIKE CELLS: PRESENT

## 2024-12-24 LAB — BACTERIA UR CULT: ABNORMAL

## 2025-07-03 NOTE — ED ADULT NURSE NOTE - FINAL NURSING ELECTRONIC SIGNATURE
REFERRAL APPROVAL NOTICE         Sent on July 3, 2025                   Ngoc Morales  [address]  [place] [address]                   Dear Ms. Morales,    After a careful review of the medical information and benefit coverage, [facility] has processed your referral. See below for additional details.    If applicable, you must be actively enrolled with your insurance for coverage of the authorized service. If you have any questions regarding your coverage, please contact your insurance directly.    REFERRAL INFORMATION   Referral #:  [id number]  Referred-To Department    Referred-By Provider:  Pain Management    Jenelle Nguyen M.D.   Pain Management       [address]  [address]  [place] [id number] [phone] [address] [place] [address] [place] [address] [phone]    Referral Start Date:  06/30/2025  Referral End Date:   09/28/2025             SCHEDULING  If you do not already have an appointment, please call [phone] to make an appointment.     MORE INFORMATION  If you do not already have a [facility] account, sign up at: [email or web address]  You can access your medical information, make appointments, see lab results, billing information, and more.  If you have questions regarding this referral, please contact  the [facility] Referrals department at:             [phone]. Monday - Friday 8:00AM - 5:00PM.     Sincerely,    [facility]    
15-Nov-2024 22:45

## 2025-07-10 NOTE — PATIENT PROFILE ADULT - FUNCTIONAL ASSESSMENT - BASIC MOBILITY 2.
Pt. calling requesting medication    Name: oxycodone-acetaminophen (PERCOCET) 7.5-325 MG per tablet     Pharmacy: [facility]    Next appt: 7/31/25      LOV: 6/3/25    Last sent:    Outpatient Medication Detail       Disp Refills Start End    oxycodone-acetaminophen (PERCOCET) 7.5-325 MG per tablet 30 tablet 0 6/6/2025 --    Sig - Route: Take 1 tablet by mouth nightly as needed for Pain. Begin taking on June 6, 2025. - Oral       4 = No assist / stand by assistance

## (undated) DEVICE — IRR BULB PATHFINDER + 10"

## (undated) DEVICE — SOL IRR POUR H2O 1500ML

## (undated) DEVICE — WARMING BLANKET UPPER ADULT

## (undated) DEVICE — SOL IRR BAG NS 0.9% 3000ML

## (undated) DEVICE — DRAPE C ARM UNIVERSAL

## (undated) DEVICE — VENODYNE/SCD SLEEVE CALF MEDIUM

## (undated) DEVICE — GLV 6.5 PROTEXIS (WHITE)

## (undated) DEVICE — TUBING RANGER FLUID IRRIGATION SET DISP

## (undated) DEVICE — VENODYNE/SCD SLEEVE CALF LARGE

## (undated) DEVICE — DRAPE EQUIPMENT BANDED BAG 30 X 30" (SHOWER CAP)

## (undated) DEVICE — FOLEY HOLDER STATLOCK 2 WAY ADULT

## (undated) DEVICE — SOL IRR BAG H2O 3000ML

## (undated) DEVICE — SOL IRR POUR NS 0.9% 500ML

## (undated) DEVICE — POSITIONER HEAD REST PRONE

## (undated) DEVICE — TUBING TUR 2 PRONG

## (undated) DEVICE — GLV 7.5 PROTEXIS (WHITE)

## (undated) DEVICE — FOLEY TRAY 16FR 5CC LTX UMETER CLOSED

## (undated) DEVICE — GLV 8 PROTEXIS (WHITE)

## (undated) DEVICE — GLV 7 PROTEXIS (WHITE)

## (undated) DEVICE — ACMI SELF-SEALING SEAL UP TO 7FR

## (undated) DEVICE — PACK CYSTO

## (undated) DEVICE — SYR ASEPTO

## (undated) DEVICE — POSITIONER FOAM EGG CRATE ULNAR 2PCS (PINK)